# Patient Record
Sex: MALE | Race: WHITE | NOT HISPANIC OR LATINO | Employment: FULL TIME | URBAN - METROPOLITAN AREA
[De-identification: names, ages, dates, MRNs, and addresses within clinical notes are randomized per-mention and may not be internally consistent; named-entity substitution may affect disease eponyms.]

---

## 2017-01-03 ENCOUNTER — ALLSCRIPTS OFFICE VISIT (OUTPATIENT)
Dept: OTHER | Facility: OTHER | Age: 52
End: 2017-01-03

## 2017-01-11 ENCOUNTER — ALLSCRIPTS OFFICE VISIT (OUTPATIENT)
Dept: OTHER | Facility: OTHER | Age: 52
End: 2017-01-11

## 2017-01-13 ENCOUNTER — GENERIC CONVERSION - ENCOUNTER (OUTPATIENT)
Dept: OTHER | Facility: OTHER | Age: 52
End: 2017-01-13

## 2017-01-13 ENCOUNTER — HOSPITAL ENCOUNTER (OUTPATIENT)
Dept: RADIOLOGY | Facility: MEDICAL CENTER | Age: 52
Discharge: HOME | End: 2017-01-13
Payer: COMMERCIAL

## 2017-01-13 DIAGNOSIS — J20.9 ACUTE BRONCHITIS: ICD-10-CM

## 2017-01-13 PROCEDURE — 71020 HB CHEST X-RAY 2VW FRONTAL&LATL: CPT

## 2017-01-16 ENCOUNTER — GENERIC CONVERSION - ENCOUNTER (OUTPATIENT)
Dept: OTHER | Facility: OTHER | Age: 52
End: 2017-01-16

## 2017-03-15 DIAGNOSIS — E78.5 HYPERLIPIDEMIA: ICD-10-CM

## 2017-03-15 DIAGNOSIS — E03.9 HYPOTHYROIDISM: ICD-10-CM

## 2017-03-27 ENCOUNTER — ALLSCRIPTS OFFICE VISIT (OUTPATIENT)
Dept: OTHER | Facility: OTHER | Age: 52
End: 2017-03-27

## 2017-05-15 ENCOUNTER — HOSPITAL ENCOUNTER (OUTPATIENT)
Dept: RADIOLOGY | Facility: MEDICAL CENTER | Age: 52
Discharge: HOME | End: 2017-05-15
Admitting: FAMILY MEDICINE
Payer: COMMERCIAL

## 2017-05-15 ENCOUNTER — OFFICE VISIT (OUTPATIENT)
Dept: URGENT CARE | Facility: MEDICAL CENTER | Age: 52
End: 2017-05-15
Payer: COMMERCIAL

## 2017-05-15 ENCOUNTER — TRANSCRIBE ORDERS (OUTPATIENT)
Dept: URGENT CARE | Facility: MEDICAL CENTER | Age: 52
End: 2017-05-15

## 2017-05-15 DIAGNOSIS — S99.911A ANKLE INJURIES, RIGHT, INITIAL ENCOUNTER: Primary | ICD-10-CM

## 2017-05-15 DIAGNOSIS — S99.911A ANKLE INJURIES, RIGHT, INITIAL ENCOUNTER: ICD-10-CM

## 2017-05-15 DIAGNOSIS — M25.571 PAIN IN RIGHT ANKLE: ICD-10-CM

## 2017-05-15 PROCEDURE — 73610 X-RAY EXAM OF ANKLE: CPT

## 2017-05-15 PROCEDURE — G0382 LEV 3 HOSP TYPE B ED VISIT: HCPCS

## 2017-05-15 PROCEDURE — S9083 URGENT CARE CENTER GLOBAL: HCPCS

## 2017-09-27 DIAGNOSIS — Z12.5 ENCOUNTER FOR SCREENING FOR MALIGNANT NEOPLASM OF PROSTATE: ICD-10-CM

## 2017-09-27 DIAGNOSIS — E03.9 HYPOTHYROIDISM: ICD-10-CM

## 2017-09-27 DIAGNOSIS — E78.5 HYPERLIPIDEMIA: ICD-10-CM

## 2017-09-27 DIAGNOSIS — J30.9 ALLERGIC RHINITIS: ICD-10-CM

## 2018-01-12 VITALS
TEMPERATURE: 98.1 F | BODY MASS INDEX: 25.82 KG/M2 | HEART RATE: 72 BPM | WEIGHT: 179.97 LBS | SYSTOLIC BLOOD PRESSURE: 138 MMHG | DIASTOLIC BLOOD PRESSURE: 70 MMHG

## 2018-01-13 VITALS
HEIGHT: 70 IN | SYSTOLIC BLOOD PRESSURE: 128 MMHG | BODY MASS INDEX: 25.97 KG/M2 | WEIGHT: 181.38 LBS | DIASTOLIC BLOOD PRESSURE: 84 MMHG | TEMPERATURE: 97.3 F

## 2018-01-13 NOTE — RESULT NOTES
Verified Results  * XR CHEST PA & LATERAL [id number] 10:20AM Alisa Wynn Order Number: [id number]     Test Name Result Flag Reference   XR CHEST PA & LATERAL (Report)     CHEST      INDICATION: Cough, chest pain and tightness, shortness of breath for 2 weeks     COMPARISON: None     VIEWS: Frontal and lateral projections; 2 images     FINDINGS:        Cardiomediastinal silhouette appears unremarkable  The lungs are clear  No pneumothorax or pleural effusion  Visualized osseous structures appear within normal limits for the patient's age  IMPRESSION:     No active pulmonary disease         Workstation performed: [id number]     Signed by:   Armando Santiago MD   1/13/17

## 2018-01-13 NOTE — RESULT NOTES
Message   Recorded as Task   Date: 04/28/2016 02:23 PM, Created By: Lux Avila   Task Name: Call Back   Assigned To: Brady and Adele,Clinical Team   Regarding Patient: Lindsey Loo, Status: Active   Comment:    Lux Avila - 28 Apr 2016 2:23 PM     TASK CREATED  Patient's knee x-ray shows mild arthritis with calcium deposits  Per radiology this might be a deposit of calcium pyrophosphate, i e  "pseudogout" I would like patient to see [facility] rheumatology for an evaluation   Alyssa Bob - 29 Apr 2016 10:40 AM     TASK EDITED  pt notified of results and instructions  order entered for rheumatology to contact pt   pt instructed to call back if he does not here from them       Signatures   Electronically signed by : Kirby Argueta, ; Apr 29 2016 10:40AM EST                       (Author)

## 2018-01-14 VITALS
HEART RATE: 64 BPM | HEIGHT: 70 IN | SYSTOLIC BLOOD PRESSURE: 120 MMHG | WEIGHT: 182.6 LBS | BODY MASS INDEX: 26.14 KG/M2 | DIASTOLIC BLOOD PRESSURE: 80 MMHG

## 2018-01-16 NOTE — MISCELLANEOUS
Message   Recorded as Task   Date: 01/12/2017 04:20 PM, Created By: Miguel Hurst   Task Name: Medical Complaint Callback   Assigned To: Maged,Clinical Team   Regarding Patient: Molly Rinne, Status: Complete   Comment:    Miguel Hurst - 12 Jan 2017 4:20 PM     TASK CREATED  BT-pt called and said he coughed up bloody mucous today and wanted to know if you wanted to see him again    Johnny Manley - 13 Jan 2017 7:09 AM     TASK REPLIED TO: Previously Assigned To Maged,Clinical Team    Have patient get a chest x-ray and restart Levaquin 500 mg one twice a day #10   Abby Pabon - 13 Jan 2017 9:45 AM     TASK EDITED  i spoke with patient regarding these instructions  [facility] orders are in computer for xray and script   cd   Abby Pabon - 13 Jan 2017 9:48 AM     TASK COMPLETED   Johnny Manley - 13 Jan 2017 3:43 PM     TASK REACTIVATED   Johnny Manley - 13 Jan 2017 3:43 PM     TASK EDITED  The prescription should be Levaquin 500 mg one daily for 10 days  Radha Cabrera - 13 Jan 2017 3:45 PM     TASK EDITED  Levaquin 500mg one daily for 10 days was sent to pt  pharmacy and he is aware  Radha Cabrera - 13 Jan 2017 3:45 PM     TASK COMPLETED        Active Problems    1  Abnormal thyroid blood test (794 5) (R94 6)   2  Acute buttock pain (729 1,338 19) (M79 1)   3  Allergic rhinitis (477 9) (J30 9)   4  Bronchitis, acute (466 0) (J20 9)   5  Encounter for prostate cancer screening (V76 44) (Z12 5)   6  Erectile dysfunction of non-organic origin (302 72) (F52 21)   7  Fatigue (780 79) (R53 83)   8  Finger injury (959 5) (S69 90XA)   9  Hamstring tendonitis at origin (727 09) (M76 899)   10  Hordeolum (373 11) (H00 019)   11  Hyperlipidemia (272 4) (E78 5)   12  Hypothyroidism (244 9) (E03 9)   13  Inguinal hernia (550 90) (K40 90)   14  Laceration of finger, initial encounter (883 0) (S61 219A)   15  Nocturia (788 43) (R35 1)   16  Pseudogout of knee (592 63,571 45) (M11 009)   17   Right knee pain (798 46) (M25 561)   18  Strain of hamstring insertion (843 8) (S76 319A)   19  Stye (373 11) (H00 019)   20  Subungual hematoma of fingernail, initial encounter (923 3) (S60 10XA)   21  Upper respiratory infection (465 9) (J06 9)    Current Meds   1  Cialis 5 MG Oral Tablet; take 1 tablet by mouth once daily; Therapy: 38JHY1105 to (Miguel Found)  Requested for: 62ATC6524; Last   Rx:16Iqg0872 Ordered   2  LevoFLOXacin 500 MG Oral Tablet; one tablet daily with food for 10 days; Therapy: 93QIQ9782 to (Last MY:58PTT1126)  Requested for: 84POG6623 Ordered   3  Levothyroxine Sodium 25 MCG Oral Tablet; Take 1 tablet daily; Therapy: 45Pyv1751 to (Last Rx:10Bqh0525)  Requested for: 55Cxo2810 Ordered   4  PredniSONE 10 MG Oral Tablet; 5 tablets for 2 days 4 tablets for 3 days 3 tablets for 3   days 2 tablets for 2 days 1 tablet for 1 day; Therapy: 12LCG3400 to (Last Rx:11Jan2017)  Requested for: 62KSR6308 Ordered   5  ProAir RespiClick 355 (90 Base) MCG/ACT Inhalation Aerosol Powder Breath Activated;   INHALE 2 PUFFS 4 times daily; Therapy: 27ZFI9151 to (Evaluate:12Jan2017); Last Rx:11Jan2017 Ordered   6  Promethazine-Codeine 6 25-10 MG/5ML Oral Syrup; TAKE 2 TSP Bedtime; Therapy: 88NCG6337 to (Evaluate:13Jan2017); Last Rx:11Jan2017 Ordered   7  Viagra 100 MG Oral Tablet; One tablet as needed, 1 to 4 hours before intercourse; Therapy: 63VCH4800 to (Last Rx:03Jan2017) Ordered    Allergies    1   Simvastatin TABS    Plan  Bronchitis, acute    · LevoFLOXacin 500 MG Oral Tablet (Levaquin); one tablet daily with food for 10  days    Signatures   Electronically signed by : Gerri Laboy, ; Jan 13 2017  3:45PM EST                       (Author)

## 2018-01-17 NOTE — MISCELLANEOUS
Message  Patient called this morning stating that he has been feeling more fatigued than usual  He has noticed this when exercising and has not had as much tolerance as usual  He is interested in possibly taking a testosterone supplement and is looking for guidance  Recommend that he first had testosterone testing and consider prescription supplement if necessary  Patient was agreeable  Labs were ordered and will be mailed to the patient  Plan  Encounter for prostate cancer screening, Fatigue, Hyperlipidemia    · (1) TESTOSTERONE, FREE (DIRECT) AND TOTAL; Status:Active; Requested  KQJ:[id number];    · (1) TSH WITH FT4 REFLEX; Status:Active; Requested MSL:54EVN2826;   Encounter for prostate cancer screening, Fatigue, Hyperlipidemia, Nocturia    · (1) PSA (SCREEN) (Dx V76 44 Screen for Prostate Cancer); Status:Active; Requested  UVM:[id number];   Fatigue, Hyperlipidemia, Nocturia    · (1) CBC/PLT/DIFF; Status:Active; Requested WOW:87BIE5599;    · (1) COMPREHENSIVE METABOLIC PANEL; Status:Active; Requested WCO:20BPU2908;    · (1) LIPID PANEL FASTING W DIRECT LDL REFLEX; Status:Active;  Requested  YNZ:[id number];     Signatures   Electronically signed by : Mahamed De La Cruz [facility]; Jun 16 2016 12:44PM EST                       (Author)

## 2018-02-24 DIAGNOSIS — E03.9 HYPOTHYROIDISM, UNSPECIFIED TYPE: Primary | ICD-10-CM

## 2018-02-26 RX ORDER — LEVOTHYROXINE SODIUM 0.03 MG/1
TABLET ORAL
Qty: 90 TABLET | Refills: 0 | Status: SHIPPED | OUTPATIENT
Start: 2018-02-26 | End: 2018-05-28 | Stop reason: SDUPTHER

## 2018-04-03 DIAGNOSIS — E78.5 HYPERLIPIDEMIA: ICD-10-CM

## 2018-04-18 ENCOUNTER — OFFICE VISIT (OUTPATIENT)
Dept: FAMILY MEDICINE CLINIC | Facility: CLINIC | Age: 53
End: 2018-04-18
Payer: COMMERCIAL

## 2018-04-18 ENCOUNTER — HOSPITAL ENCOUNTER (OUTPATIENT)
Dept: CT IMAGING | Facility: HOSPITAL | Age: 53
Discharge: HOME | End: 2018-04-18
Payer: COMMERCIAL

## 2018-04-18 VITALS
SYSTOLIC BLOOD PRESSURE: 108 MMHG | HEIGHT: 69 IN | BODY MASS INDEX: 27.13 KG/M2 | WEIGHT: 183.2 LBS | DIASTOLIC BLOOD PRESSURE: 78 MMHG | HEART RATE: 64 BPM

## 2018-04-18 DIAGNOSIS — R10.9 RIGHT FLANK PAIN: ICD-10-CM

## 2018-04-18 DIAGNOSIS — R10.9 RIGHT FLANK PAIN: Primary | ICD-10-CM

## 2018-04-18 LAB
SL AMB  POCT GLUCOSE, UA: NORMAL
SL AMB LEUKOCYTE ESTERASE,UA: NORMAL
SL AMB POCT BILIRUBIN,UA: NORMAL
SL AMB POCT BLOOD,UA: NORMAL
SL AMB POCT CLARITY,UA: CLEAR
SL AMB POCT COLOR,UA: YELLOW
SL AMB POCT KETONES,UA: NORMAL
SL AMB POCT NITRITE,UA: NORMAL
SL AMB POCT PH,UA: 6.5
SL AMB POCT SPECIFIC GRAVITY,UA: 1.01
SL AMB POCT URINE PROTEIN: NORMAL
SL AMB POCT UROBILINOGEN: 0.2

## 2018-04-18 PROCEDURE — 81003 URINALYSIS AUTO W/O SCOPE: CPT | Performed by: PHYSICIAN ASSISTANT

## 2018-04-18 PROCEDURE — 99214 OFFICE O/P EST MOD 30 MIN: CPT | Performed by: PHYSICIAN ASSISTANT

## 2018-04-18 PROCEDURE — 74176 CT ABD & PELVIS W/O CONTRAST: CPT

## 2018-04-18 RX ORDER — TADALAFIL 5 MG/1
TABLET ORAL
COMMUNITY
Start: 2013-10-28 | End: 2018-10-09 | Stop reason: SDUPTHER

## 2018-04-18 RX ORDER — FENOFIBRATE 160 MG/1
1 TABLET ORAL DAILY
COMMUNITY
Start: 2017-03-27 | End: 2018-04-27 | Stop reason: SDUPTHER

## 2018-04-18 NOTE — PATIENT INSTRUCTIONS
1   Right flank pain-possible nephrolithiasis versus pyelonephritis  Urinalysis in the office is within normal limits  There are very little symptoms to support pyelonephritis at this time  I suspect he may have a small kidney stone versus a passed kidney stone  He is leaving for [place] [employer] [facility]) tomorrow and would recommend stat CT scan today to assess for stones size in location to rule out any emergent situations or evidence of hydronephrosis from obstruction  Patient verbalizes understanding and agreement with plan

## 2018-04-18 NOTE — PROGRESS NOTES
Assessment/Plan:  Patient Instructions   1  Right flank pain-possible nephrolithiasis versus pyelonephritis  Urinalysis in the office is within normal limits  There are very little symptoms to support pyelonephritis at this time  I suspect he may have a small kidney stone versus a passed kidney stone  He is leaving for [place] [facility]) tomorrow and would recommend stat CT scan today to assess for stones size in location to rule out any emergent situations or evidence of hydronephrosis from obstruction  Patient verbalizes understanding and agreement with plan  No problem-specific Assessment & Plan notes found for this encounter  Diagnoses and all orders for this visit:    Right flank pain  -     CT renal stone study abdomen pelvis wo contrast; Future    Other orders  -     fenofibrate (TRIGLIDE) 160 MG tablet; Take 1 tablet by mouth daily  -     tadalafil (CIALIS) 5 MG tablet; Take by mouth          Subjective: Pt c/o right sided flank pain on and off x 1 week but constant over the last 2 to 3 days  kw     Patient ID: Elkin Jones is a 46 y o  male  HPI:  This is a 72-year-old gentleman that presents to the office with onset of right flank pain about a week ago  Symptoms were intermittent but could be quite severe and intense when it would come  Over the past 2-3 days it has been more consistent  Pain at its worst is an 8/10  He describes it is very sharp in nature and often stops him from what he is doing at the time  He has had some generalized chills over the past few days but is unaware of any measurable fevers  He has not had any new urinary symptoms and denies burning or discomfort upon urination  He has not had any urinary frequency  He denies any prior history of kidney stones          The following portions of the patient's history were reviewed and updated as appropriate: allergies, current medications, past family history, past medical history, past social history, past surgical history and problem list     Review of Systems   Constitutional: Negative for chills, fatigue and fever  HENT: Negative for congestion, ear pain and sinus pressure  Eyes: Negative for visual disturbance  Respiratory: Negative for cough, chest tightness and shortness of breath  Cardiovascular: Negative for chest pain and palpitations  Gastrointestinal: Negative for diarrhea, nausea and vomiting  Endocrine: Negative for polyuria  Genitourinary: Positive for flank pain  Negative for dysuria and frequency  Musculoskeletal: Positive for myalgias  Negative for arthralgias  Skin: Negative for pallor and rash  Neurological: Negative for dizziness, weakness, light-headedness, numbness and headaches  Psychiatric/Behavioral: Negative for agitation, behavioral problems and sleep disturbance  All other systems reviewed and are negative  Objective:  Vitals:    04/18/18 0806   BP: 108/78   BP Location: Left arm   Pulse: 64   Weight: 83 1 kg (183 lb 3 2 oz)   Height: 5' 9" (1 753 m)         /78 (BP Location: Left arm)   Pulse 64   Ht 5' 9" (1 753 m)   Wt 83 1 kg (183 lb 3 2 oz)   BMI 27 05 kg/m²          Physical Exam   Constitutional: He is oriented to person, place, and time  He appears well-developed and well-nourished  HENT:   Head: Normocephalic  Cardiovascular: Normal rate and regular rhythm  Pulmonary/Chest: Effort normal and breath sounds normal  No respiratory distress  Abdominal: Soft  He exhibits distension  There is tenderness  There is rebound  There is some mild tenderness to deep palpation in the posterior renal area  There is no CVA tenderness  Musculoskeletal: Normal range of motion  Neurological: He is alert and oriented to person, place, and time  Psychiatric: He has a normal mood and affect

## 2018-04-27 DIAGNOSIS — E78.2 MIXED HYPERLIPIDEMIA: Primary | ICD-10-CM

## 2018-04-27 RX ORDER — FENOFIBRATE 160 MG/1
TABLET ORAL
Qty: 90 TABLET | Refills: 1 | Status: SHIPPED | OUTPATIENT
Start: 2018-04-27 | End: 2018-10-07 | Stop reason: SDUPTHER

## 2018-05-28 DIAGNOSIS — E03.9 HYPOTHYROIDISM, UNSPECIFIED TYPE: ICD-10-CM

## 2018-05-29 RX ORDER — LEVOTHYROXINE SODIUM 0.03 MG/1
TABLET ORAL
Qty: 90 TABLET | Refills: 0 | Status: SHIPPED | OUTPATIENT
Start: 2018-05-29 | End: 2018-08-27 | Stop reason: SDUPTHER

## 2018-06-11 ENCOUNTER — OFFICE VISIT (OUTPATIENT)
Dept: FAMILY MEDICINE CLINIC | Facility: CLINIC | Age: 53
End: 2018-06-11
Payer: COMMERCIAL

## 2018-06-11 ENCOUNTER — APPOINTMENT (OUTPATIENT)
Dept: LAB | Facility: CLINIC | Age: 53
End: 2018-06-11
Payer: COMMERCIAL

## 2018-06-11 ENCOUNTER — TRANSCRIBE ORDERS (OUTPATIENT)
Dept: LAB | Facility: CLINIC | Age: 53
End: 2018-06-11

## 2018-06-11 VITALS
HEIGHT: 69 IN | SYSTOLIC BLOOD PRESSURE: 122 MMHG | BODY MASS INDEX: 27.08 KG/M2 | HEART RATE: 60 BPM | DIASTOLIC BLOOD PRESSURE: 68 MMHG | WEIGHT: 182.8 LBS

## 2018-06-11 DIAGNOSIS — M75.51 ACUTE BURSITIS OF RIGHT SHOULDER: ICD-10-CM

## 2018-06-11 DIAGNOSIS — E03.9 HYPOTHYROIDISM, UNSPECIFIED TYPE: ICD-10-CM

## 2018-06-11 DIAGNOSIS — E78.5 HYPERLIPIDEMIA, UNSPECIFIED HYPERLIPIDEMIA TYPE: ICD-10-CM

## 2018-06-11 DIAGNOSIS — G47.00 INSOMNIA, UNSPECIFIED TYPE: Primary | ICD-10-CM

## 2018-06-11 LAB — TSH SERPL DL<=0.05 MIU/L-ACNC: 2.46 UIU/ML (ref 0.36–3.74)

## 2018-06-11 PROCEDURE — 84443 ASSAY THYROID STIM HORMONE: CPT | Performed by: PHYSICIAN ASSISTANT

## 2018-06-11 PROCEDURE — 99214 OFFICE O/P EST MOD 30 MIN: CPT | Performed by: PHYSICIAN ASSISTANT

## 2018-06-11 PROCEDURE — 36415 COLL VENOUS BLD VENIPUNCTURE: CPT | Performed by: PHYSICIAN ASSISTANT

## 2018-06-11 RX ORDER — TRAZODONE HYDROCHLORIDE 50 MG/1
50 TABLET ORAL
Qty: 30 TABLET | Refills: 1 | Status: SHIPPED | OUTPATIENT
Start: 2018-06-11 | End: 2019-01-22

## 2018-06-11 RX ORDER — DICLOFENAC POTASSIUM 50 MG/1
50 TABLET, FILM COATED ORAL 2 TIMES DAILY
Qty: 60 TABLET | Refills: 0 | Status: SHIPPED | OUTPATIENT
Start: 2018-06-11 | End: 2018-08-14

## 2018-06-11 NOTE — PATIENT INSTRUCTIONS
1   Insomnia-recommend assessing TSH level to rule out thyroid abnormality contributing to this  He will continue levothyroxine at 25 mcg daily  We will try trazodone 50 mg at bedtime for the next 2 weeks and if not helping consider titrating up to a higher dosage  2   Right shoulder pain/subacromial bursitis-recommend treatment with diclofenac 50 mg twice daily with food  If symptoms are not better in the next 2 weeks consider referral to orthopedic specialist for injection therapy  3   Hypothyroidism-status unknown  Will reassess labs as above  4   Hyperlipidemia-presently stable with statin therapy

## 2018-06-11 NOTE — PROGRESS NOTES
Assessment/Plan:  Patient Instructions   1  Insomnia-recommend assessing TSH level to rule out thyroid abnormality contributing to this  He will continue levothyroxine at 25 mcg daily  We will try trazodone 50 mg at bedtime for the next 2 weeks and if not helping consider titrating up to a higher dosage  2   Right shoulder pain/subacromial bursitis-recommend treatment with diclofenac 50 mg twice daily with food  If symptoms are not better in the next 2 weeks consider referral to orthopedic specialist for injection therapy  3   Hypothyroidism-status unknown  Will reassess labs as above  4   Hyperlipidemia-presently stable with statin therapy  Diagnoses and all orders for this visit:    Insomnia, unspecified type  -     TSH, 3rd generation with T4 reflex  -     traZODone (DESYREL) 50 mg tablet; Take 1 tablet (50 mg total) by mouth daily at bedtime    Hypothyroidism, unspecified type    Hyperlipidemia, unspecified hyperlipidemia type    Acute bursitis of right shoulder  -     diclofenac potassium (CATAFLAM) 50 mg tablet; Take 1 tablet (50 mg total) by mouth 2 (two) times a day          Subjective: pt is here because of a change in sleep pattern in the last month  He states he falls asleep fine but has trouble staying asleep~cd     Patient ID: Charlene Medellin is a 46 y o  male  HPI:  This is a 51-year-old gentleman that presents to the office with concerns over change in his sleep pattern for the past month  He has been having difficulty waking about 30 min to an hour in to sleep  He typically goes to sleep between 9 and 10:00 p m  and wakes up between 530 and 6:00 a m  He has been lying awake much of the night after he has woken up in the middle  He has had difficulty returning to sleep sometimes for 2-4 hours  He is then feeling very tired when he wakes up in the morning at 5:30 a m  or 6:00 a m    He has been having some shoulder pain recently as well and is not sure if this may be waking him more could be related  It is at his right shoulder and most painful with abduction  He has been working in the evenings on a home project, laying some brick recently  He is also very physically active in continues to lift weights regularly  He does also have a history of hypothyroidism and has been sometime since he has had a TSH level drawn  He is continuing levothyroxine 25 mcg daily  He has also tried over-the-counter medications such as melatonin without any benefit  The following portions of the patient's history were reviewed and updated as appropriate: allergies, current medications, past family history, past medical history, past social history, past surgical history and problem list     Review of Systems   Constitutional: Negative for fever  HENT: Negative for congestion  Respiratory: Negative for cough, chest tightness and shortness of breath  Cardiovascular: Negative for chest pain  Musculoskeletal: Positive for arthralgias  Psychiatric/Behavioral: Positive for sleep disturbance  Objective:  Vitals:    06/11/18 1330   BP: 122/68   BP Location: Left arm   Patient Position: Sitting   Cuff Size: Large   Pulse: 60   Weight: 82 9 kg (182 lb 12 8 oz)   Height: 5' 9" (1 753 m)                Physical Exam   Constitutional: He is oriented to person, place, and time  He appears well-developed and well-nourished  HENT:   Head: Normocephalic  Cardiovascular: Normal rate and regular rhythm  Pulmonary/Chest: Effort normal and breath sounds normal  No respiratory distress  Abdominal: Soft  Musculoskeletal: Normal range of motion  Pain with abduction of the right shoulder greater than 90°  There is point tenderness at the [address]  Neurological: He is alert and oriented to person, place, and time  Psychiatric: He has a normal mood and affect  Nursing note and vitals reviewed

## 2018-08-14 ENCOUNTER — OFFICE VISIT (OUTPATIENT)
Dept: FAMILY MEDICINE CLINIC | Facility: CLINIC | Age: 53
End: 2018-08-14
Payer: COMMERCIAL

## 2018-08-14 VITALS
HEIGHT: 69 IN | BODY MASS INDEX: 26.36 KG/M2 | HEART RATE: 64 BPM | WEIGHT: 178 LBS | DIASTOLIC BLOOD PRESSURE: 84 MMHG | SYSTOLIC BLOOD PRESSURE: 124 MMHG

## 2018-08-14 DIAGNOSIS — M25.562 LEFT KNEE PAIN, UNSPECIFIED CHRONICITY: Primary | ICD-10-CM

## 2018-08-14 PROCEDURE — 1036F TOBACCO NON-USER: CPT | Performed by: PHYSICIAN ASSISTANT

## 2018-08-14 PROCEDURE — 99213 OFFICE O/P EST LOW 20 MIN: CPT | Performed by: PHYSICIAN ASSISTANT

## 2018-08-14 PROCEDURE — 3008F BODY MASS INDEX DOCD: CPT | Performed by: PHYSICIAN ASSISTANT

## 2018-08-14 NOTE — PROGRESS NOTES
Assessment/Plan:  Patient Instructions   1  Left knee pain-history of surgery at age 23 from sports related injuries  Patient continues avid running but is having limitation  Will consult with Orthopedic surgery to discuss best intervention  Diagnoses and all orders for this visit:    Left knee pain, unspecified chronicity  -     Ambulatory referral to Orthopedic Surgery; Future          Subjective:   C/o left knee pain  Hx of reconstruction 1984  mjs     Patient ID: Paula Mendez is a 46 y o  male  HPI:  This is a 59-year-old gentleman that presents to the office with a history of left knee reconstructive surgery at the age of 23 from sports related injuries  He has been an avid runner and runs competitively  He has been having increasing difficulty in the past year with his running and noticing that he is favoring his right leg  He has been running asymmetrically and concern with possible other injuries to other joints which may incur if he continues  He is having significant discomfort in his left knee and feels that it is unstable at times  He has quite a bit of grinding and popping sensation with it  He would like to see a an orthopedic surgeon and discuss total knee replacement options  The following portions of the patient's history were reviewed and updated as appropriate: allergies, current medications, past family history, past medical history, past social history, past surgical history and problem list     Review of Systems   Constitutional: Negative for chills, fatigue and fever  HENT: Negative for congestion, ear pain and sinus pressure  Eyes: Negative for visual disturbance  Respiratory: Negative for cough, chest tightness and shortness of breath  Cardiovascular: Negative for chest pain and palpitations  Gastrointestinal: Negative for diarrhea, nausea and vomiting  Endocrine: Negative for polyuria  Genitourinary: Negative for dysuria and frequency  Musculoskeletal: Positive for arthralgias  Negative for myalgias  Skin: Negative for pallor and rash  Neurological: Negative for dizziness, weakness, light-headedness, numbness and headaches  Psychiatric/Behavioral: Negative for agitation, behavioral problems and sleep disturbance  All other systems reviewed and are negative  Objective:      /84   Pulse 64   Ht 5' 9" (1 753 m)   Wt 80 7 kg (178 lb)   BMI 26 29 kg/m²          Physical Exam   Constitutional: He is oriented to person, place, and time  He appears well-developed and well-nourished  No distress  HENT:   Head: Normocephalic and atraumatic  Right Ear: External ear normal    Left Ear: External ear normal    Nose: Nose normal    Mouth/Throat: Oropharynx is clear and moist  No oropharyngeal exudate  Eyes: Conjunctivae and EOM are normal  Pupils are equal, round, and reactive to light  Neck: Normal range of motion  Neck supple  No tracheal deviation present  No thyromegaly present  Cardiovascular: Normal rate, regular rhythm and normal heart sounds  Exam reveals no friction rub  No murmur heard  Pulmonary/Chest: Effort normal and breath sounds normal  No respiratory distress  He has no wheezes  He has no rales  Abdominal: Soft  Bowel sounds are normal  He exhibits no distension  There is no tenderness  There is no rebound and no guarding  Musculoskeletal: Normal range of motion  He exhibits no edema or tenderness  Lymphadenopathy:     He has no cervical adenopathy  Neurological: He is alert and oriented to person, place, and time  No cranial nerve deficit  Coordination normal    Skin: Skin is warm and dry  No rash noted  No erythema  Psychiatric: He has a normal mood and affect  His behavior is normal  Thought content normal    Nursing note and vitals reviewed

## 2018-08-14 NOTE — PATIENT INSTRUCTIONS
1   Left knee pain-history of surgery at age 23 from sports related injuries  Patient continues avid running but is having limitation  Will consult with Orthopedic surgery to discuss best intervention

## 2018-08-27 DIAGNOSIS — E03.9 HYPOTHYROIDISM, UNSPECIFIED TYPE: ICD-10-CM

## 2018-08-27 RX ORDER — LEVOTHYROXINE SODIUM 0.03 MG/1
TABLET ORAL
Qty: 90 TABLET | Refills: 0 | Status: SHIPPED | OUTPATIENT
Start: 2018-08-27 | End: 2018-11-25 | Stop reason: SDUPTHER

## 2018-10-02 ENCOUNTER — CLINICAL SUPPORT (OUTPATIENT)
Dept: FAMILY MEDICINE CLINIC | Facility: CLINIC | Age: 53
End: 2018-10-02
Payer: COMMERCIAL

## 2018-10-02 DIAGNOSIS — Z11.1 SCREENING FOR TUBERCULOSIS: Primary | ICD-10-CM

## 2018-10-02 PROCEDURE — 86580 TB INTRADERMAL TEST: CPT | Performed by: PHYSICIAN ASSISTANT

## 2018-10-05 LAB
INDURATION: 0 MM
TB SKIN TEST: NEGATIVE

## 2018-10-07 DIAGNOSIS — E78.2 MIXED HYPERLIPIDEMIA: ICD-10-CM

## 2018-10-08 RX ORDER — FENOFIBRATE 160 MG/1
TABLET ORAL
Qty: 90 TABLET | Refills: 1 | Status: SHIPPED | OUTPATIENT
Start: 2018-10-08 | End: 2019-04-06 | Stop reason: SDUPTHER

## 2018-10-09 DIAGNOSIS — F52.21 ERECTILE DYSFUNCTION OF NON-ORGANIC ORIGIN: Primary | ICD-10-CM

## 2018-10-09 RX ORDER — TADALAFIL 5 MG
TABLET ORAL
Qty: 30 TABLET | Refills: 5 | Status: SHIPPED | OUTPATIENT
Start: 2018-10-09 | End: 2019-01-22 | Stop reason: ALTCHOICE

## 2018-11-25 DIAGNOSIS — E03.9 HYPOTHYROIDISM, UNSPECIFIED TYPE: ICD-10-CM

## 2018-11-26 RX ORDER — LEVOTHYROXINE SODIUM 0.03 MG/1
TABLET ORAL
Qty: 90 TABLET | Refills: 0 | Status: SHIPPED | OUTPATIENT
Start: 2018-11-26 | End: 2019-02-23 | Stop reason: SDUPTHER

## 2019-01-22 ENCOUNTER — OFFICE VISIT (OUTPATIENT)
Dept: FAMILY MEDICINE CLINIC | Facility: CLINIC | Age: 54
End: 2019-01-22
Payer: COMMERCIAL

## 2019-01-22 VITALS
SYSTOLIC BLOOD PRESSURE: 136 MMHG | HEIGHT: 69 IN | WEIGHT: 187 LBS | DIASTOLIC BLOOD PRESSURE: 70 MMHG | BODY MASS INDEX: 27.7 KG/M2 | TEMPERATURE: 97.4 F | HEART RATE: 80 BPM

## 2019-01-22 DIAGNOSIS — R51.9 NONINTRACTABLE HEADACHE, UNSPECIFIED CHRONICITY PATTERN, UNSPECIFIED HEADACHE TYPE: Primary | ICD-10-CM

## 2019-01-22 DIAGNOSIS — E78.5 HYPERLIPIDEMIA, UNSPECIFIED HYPERLIPIDEMIA TYPE: ICD-10-CM

## 2019-01-22 DIAGNOSIS — E03.9 HYPOTHYROIDISM, UNSPECIFIED TYPE: ICD-10-CM

## 2019-01-22 DIAGNOSIS — J32.1 CHRONIC FRONTAL SINUSITIS: ICD-10-CM

## 2019-01-22 PROCEDURE — 99214 OFFICE O/P EST MOD 30 MIN: CPT | Performed by: PHYSICIAN ASSISTANT

## 2019-01-22 RX ORDER — SILDENAFIL 100 MG/1
100 TABLET, FILM COATED ORAL DAILY PRN
Qty: 10 TABLET | Refills: 5 | Status: SHIPPED | OUTPATIENT
Start: 2019-01-22 | End: 2019-11-29

## 2019-01-22 RX ORDER — ASPIRIN 81 MG/1
81 TABLET ORAL DAILY
COMMUNITY
End: 2021-01-19 | Stop reason: ALTCHOICE

## 2019-01-22 RX ORDER — AMOXICILLIN AND CLAVULANATE POTASSIUM 875; 125 MG/1; MG/1
1 TABLET, FILM COATED ORAL EVERY 12 HOURS SCHEDULED
Qty: 20 TABLET | Refills: 0 | Status: SHIPPED | OUTPATIENT
Start: 2019-01-22 | End: 2019-02-01

## 2019-01-22 RX ORDER — LANOLIN ALCOHOL/MO/W.PET/CERES
CREAM (GRAM) TOPICAL DAILY
Status: ON HOLD | COMMUNITY
End: 2019-12-12 | Stop reason: ALTCHOICE

## 2019-01-22 NOTE — PROGRESS NOTES
Assessment/Plan:  Patient Instructions   1  Headache-differential includes tension headache, chronic sinusitis, thyroid or electrolyte abnormality  Will also assess Lyme disease panel and rule out temporal arteritis with sed rate  Recommend starting on Augmentin 875 mg twice daily for 10 days for probable sinusitis  If symptoms are not improving and labs are all normal consider further evaluation with CT imaging  2  Chronic sinusitis-patient with some symptoms of congestion, neck aches, dizziness  Ongoing symptoms for the past month  Treatment as above with Augmentin  3  Hypothyroidism-status unknown-patient continues levothyroxine 25 mcg daily  4  Mixed hyperlipidemia-will reassess labs  Continue fenofibrate  Diagnoses and all orders for this visit:    Nonintractable headache, unspecified chronicity pattern, unspecified headache type  -     sildenafil (VIAGRA) 100 mg tablet; Take 1 tablet (100 mg total) by mouth daily as needed for erectile dysfunction  -     CBC and differential  -     Comprehensive metabolic panel  -     Lipid Panel with Direct LDL reflex  -     TSH, 3rd generation with Free T4 reflex  -     Sedimentation rate, automated  -     Lyme Antibody Profile with reflex to WB    Chronic frontal sinusitis  -     amoxicillin-clavulanate (AUGMENTIN) 875-125 mg per tablet;  Take 1 tablet by mouth every 12 (twelve) hours for 10 days  -     CBC and differential  -     Comprehensive metabolic panel  -     Lipid Panel with Direct LDL reflex  -     TSH, 3rd generation with Free T4 reflex  -     Sedimentation rate, automated  -     Lyme Antibody Profile with reflex to WB    Hypothyroidism, unspecified type  -     CBC and differential  -     Comprehensive metabolic panel  -     Lipid Panel with Direct LDL reflex  -     TSH, 3rd generation with Free T4 reflex  -     Sedimentation rate, automated  -     Lyme Antibody Profile with reflex to WB    Hyperlipidemia, unspecified hyperlipidemia type  - CBC and differential  -     Comprehensive metabolic panel  -     Lipid Panel with Direct LDL reflex  -     TSH, 3rd generation with Free T4 reflex  -     Sedimentation rate, automated  -     Lyme Antibody Profile with reflex to WB    Other orders  -     aspirin (ECOTRIN LOW STRENGTH) 81 mg EC tablet; Take 81 mg by mouth daily  -     cyanocobalamin (VITAMIN B-12) 1,000 mcg tablet; Take by mouth daily          Subjective:   C/o pinching headache for 1 month  Describes it in front of head  Also, some congestion, neck aches, crackling in ears  Has been traveling  Takes Aleve prn   mjs     Patient ID: Brodie Andrews is a 48 y o  male  HPI:  This is a 59-year-old gentleman that presents to the office with 4-6 weeks of ongoing headache which is locally at bilateral temporal areas  He states the headaches do not get very severe they are a 3/10 on a pain scale better pinching and bothersome in nature  He has also had some sinus congestion, occasional dizziness, and neck aches lately  He has not had any major change in his in his routine with the exception of knee surgery in November and starting to exercise more regularly  He maintains the same diet and caffeine consumption  The following portions of the patient's history were reviewed and updated as appropriate: allergies, current medications, past family history, past medical history, past social history, past surgical history and problem list     Review of Systems   Constitutional: Negative for chills, fatigue and fever  HENT: Negative for congestion, ear pain and sinus pressure  Eyes: Negative for visual disturbance  Respiratory: Negative for cough, chest tightness and shortness of breath  Cardiovascular: Negative for chest pain and palpitations  Gastrointestinal: Negative for diarrhea, nausea and vomiting  Endocrine: Negative for polyuria  Genitourinary: Negative for dysuria and frequency     Musculoskeletal: Negative for arthralgias and myalgias  Skin: Negative for pallor and rash  Neurological: Negative for dizziness, weakness, light-headedness, numbness and headaches  Psychiatric/Behavioral: Negative for agitation, behavioral problems and sleep disturbance  All other systems reviewed and are negative  Objective:      /70   Pulse 80   Temp (!) 97 4 °F (36 3 °C)   Ht 5' 9" (1 753 m)   Wt 84 8 kg (187 lb)   BMI 27 62 kg/m²          Physical Exam   Constitutional: He is oriented to person, place, and time  He appears well-developed and well-nourished  No distress  HENT:   Head: Normocephalic and atraumatic  Right Ear: External ear normal    Left Ear: External ear normal    Nose: Nose normal    Mouth/Throat: Oropharynx is clear and moist  No oropharyngeal exudate  Turbinates are erythematous and edematous bilaterally  Post nasal drip of the posterior pharynx noted  Eyes: Pupils are equal, round, and reactive to light  Conjunctivae and EOM are normal  Right eye exhibits no discharge  Left eye exhibits no discharge  Neck: Normal range of motion  Neck supple  No tracheal deviation present  No thyromegaly present  Cardiovascular: Normal rate, regular rhythm and normal heart sounds  Exam reveals no friction rub  No murmur heard  Pulmonary/Chest: Effort normal and breath sounds normal  No respiratory distress  He has no wheezes  He has no rales  Abdominal: Soft  Bowel sounds are normal  He exhibits no distension  There is no tenderness  There is no rebound and no guarding  Musculoskeletal: Normal range of motion  He exhibits no edema or tenderness  Lymphadenopathy:     He has no cervical adenopathy  Neurological: He is alert and oriented to person, place, and time  No cranial nerve deficit  Coordination normal    Skin: Skin is warm and dry  No rash noted  No erythema  Psychiatric: He has a normal mood and affect  His behavior is normal  Thought content normal    Nursing note and vitals reviewed

## 2019-01-22 NOTE — PATIENT INSTRUCTIONS
1   Headache-differential includes tension headache, chronic sinusitis, thyroid or electrolyte abnormality  Will also assess Lyme disease panel and rule out temporal arteritis with sed rate  Recommend starting on Augmentin 875 mg twice daily for 10 days for probable sinusitis  If symptoms are not improving and labs are all normal consider further evaluation with CT imaging  2  Chronic sinusitis-patient with some symptoms of congestion, neck aches, dizziness  Ongoing symptoms for the past month  Treatment as above with Augmentin  3  Hypothyroidism-status unknown-patient continues levothyroxine 25 mcg daily  4  Mixed hyperlipidemia-will reassess labs  Continue fenofibrate

## 2019-02-11 ENCOUNTER — OFFICE VISIT (OUTPATIENT)
Dept: FAMILY MEDICINE CLINIC | Facility: CLINIC | Age: 54
End: 2019-02-11
Payer: COMMERCIAL

## 2019-02-11 VITALS
DIASTOLIC BLOOD PRESSURE: 70 MMHG | BODY MASS INDEX: 27.25 KG/M2 | WEIGHT: 184 LBS | SYSTOLIC BLOOD PRESSURE: 122 MMHG | HEART RATE: 72 BPM | HEIGHT: 69 IN

## 2019-02-11 DIAGNOSIS — R51.9 NONINTRACTABLE HEADACHE, UNSPECIFIED CHRONICITY PATTERN, UNSPECIFIED HEADACHE TYPE: ICD-10-CM

## 2019-02-11 DIAGNOSIS — R19.7 DIARRHEA, UNSPECIFIED TYPE: Primary | ICD-10-CM

## 2019-02-11 DIAGNOSIS — E03.9 HYPOTHYROIDISM, UNSPECIFIED TYPE: ICD-10-CM

## 2019-02-11 DIAGNOSIS — R11.0 NAUSEA: ICD-10-CM

## 2019-02-11 PROCEDURE — 99214 OFFICE O/P EST MOD 30 MIN: CPT | Performed by: PHYSICIAN ASSISTANT

## 2019-02-11 PROCEDURE — 1036F TOBACCO NON-USER: CPT | Performed by: PHYSICIAN ASSISTANT

## 2019-02-11 PROCEDURE — 3008F BODY MASS INDEX DOCD: CPT | Performed by: PHYSICIAN ASSISTANT

## 2019-02-11 NOTE — PATIENT INSTRUCTIONS
1   Diarrhea-persistent x3 weeks-post antibiotic therapy from Augmentin-recommend assessing C diff culture as well as stool culture and ova and parasite exam   Continue probiotic therapies  Treat as necessary  2  Nausea-likely associated with infection as above  3  Sinus headache-symptoms persist despite antibiotic therapy  Recommend x-ray of the sinuses and consider further evaluation by allergist or Otolaryngology as necessary  4  Hypothyroidism-stable, no medication changes  TSH completed earlier this month was within normal limits

## 2019-02-11 NOTE — PROGRESS NOTES
Assessment/Plan:  Patient Instructions   1  Diarrhea-persistent x3 weeks-post antibiotic therapy from Augmentin-recommend assessing C diff culture as well as stool culture and ova and parasite exam   Continue probiotic therapies  Treat as necessary  2  Nausea-likely associated with infection as above  3  Sinus headache-symptoms persist despite antibiotic therapy  Recommend x-ray of the sinuses and consider further evaluation by allergist or Otolaryngology as necessary  4  Hypothyroidism-stable, no medication changes  TSH completed earlier this month was within normal limits  Diagnoses and all orders for this visit:    Diarrhea, unspecified type  -     Stool Enteric Bacterial Panel by PCR; Future  -     Clostridium difficile toxin by PCR; Future  -     Ova and parasite examination; Future    Nausea  -     Stool Enteric Bacterial Panel by PCR; Future  -     Clostridium difficile toxin by PCR; Future  -     Ova and parasite examination; Future    Hypothyroidism, unspecified type    Nonintractable headache, unspecified chronicity pattern, unspecified headache type  -     XR sinuses < 3 vw; Future          Subjective: pt is here for gi issues since being on augmentin last month  He states he has been off it for two weeks but is still experience diarrhea, cramping and nausea~cd     Patient ID: Torey Melton is a 48 y o  male  HPI:  This is a 77-year-old gentleman that presents to the office with stomach cramps and frequent diarrhea at least 6 times per day which is mostly watery with occasional mucus  This is been going on for almost 3 weeks  Patient was on antibiotic therapy with Augmentin twice daily  Symptoms started around the same time but did not stop after Augmentin was discontinued  He has been taking some probiotics over-the-counter without much benefit  He was on Augmentin therapy for ongoing headaches and sinus congestion    Does not feel that the antibiotics seemed to help with this       The following portions of the patient's history were reviewed and updated as appropriate: allergies, current medications, past family history, past medical history, past social history, past surgical history and problem list     Review of Systems   Constitutional: Negative for fever  HENT: Positive for congestion  Respiratory: Negative for cough, chest tightness and shortness of breath  Cardiovascular: Negative for chest pain  Gastrointestinal: Positive for abdominal distention, diarrhea and nausea  Negative for blood in stool and vomiting  Musculoskeletal: Negative for arthralgias  Neurological: Positive for headaches  Objective:    Vitals:    02/11/19 1423   BP: 122/70   BP Location: Left arm   Patient Position: Sitting   Cuff Size: Large   Pulse: 72   Weight: 83 5 kg (184 lb)   Height: 5' 9" (1 753 m)                Physical Exam   Constitutional: He is oriented to person, place, and time  He appears well-developed and well-nourished  No distress  HENT:   Head: Normocephalic and atraumatic  Right Ear: External ear normal    Left Ear: External ear normal    Nose: Nose normal    Mouth/Throat: Oropharynx is clear and moist  No oropharyngeal exudate  Turbinates are erythematous and edematous bilaterally  Eyes: Pupils are equal, round, and reactive to light  Conjunctivae and EOM are normal    Neck: Normal range of motion  Neck supple  No tracheal deviation present  No thyromegaly present  Cardiovascular: Normal rate, regular rhythm and normal heart sounds  Exam reveals no friction rub  No murmur heard  Pulmonary/Chest: Effort normal and breath sounds normal  No respiratory distress  He has no wheezes  He has no rales  Abdominal: Soft  Bowel sounds are normal  He exhibits no distension  There is no tenderness  There is no rebound and no guarding  Musculoskeletal: Normal range of motion  He exhibits no edema or tenderness     Lymphadenopathy:     He has no cervical adenopathy  Neurological: He is alert and oriented to person, place, and time  No cranial nerve deficit  Coordination normal    Skin: Skin is warm and dry  No rash noted  No erythema  Psychiatric: He has a normal mood and affect  His behavior is normal  Thought content normal    Nursing note and vitals reviewed

## 2019-02-18 ENCOUNTER — TELEPHONE (OUTPATIENT)
Dept: OTHER | Facility: OTHER | Age: 54
End: 2019-02-18

## 2019-02-18 ENCOUNTER — DOCUMENTATION (OUTPATIENT)
Dept: FAMILY MEDICINE CLINIC | Facility: CLINIC | Age: 54
End: 2019-02-18

## 2019-02-19 ENCOUNTER — TELEPHONE (OUTPATIENT)
Dept: FAMILY MEDICINE CLINIC | Facility: CLINIC | Age: 54
End: 2019-02-19

## 2019-02-19 DIAGNOSIS — A04.72 C. DIFFICILE DIARRHEA: Primary | ICD-10-CM

## 2019-02-19 RX ORDER — METRONIDAZOLE 500 MG/1
500 TABLET ORAL EVERY 8 HOURS SCHEDULED
Qty: 21 TABLET | Refills: 0 | Status: SHIPPED | OUTPATIENT
Start: 2019-02-19 | End: 2019-02-26

## 2019-02-19 NOTE — TELEPHONE ENCOUNTER
Call from [address] [place] [address] lab shows C diff is positive  Would recommend starting patient on Flagyl 500 mg 3 times daily for 7 days  I will send this in to his [facility] on [address]  Please make sure that patient is aware that he cannot drink any alcohol while on this medication

## 2019-02-19 NOTE — TELEPHONE ENCOUNTER
Dr Prince Mims called in to service and Transferred him [facility]  Regarding lab results

## 2019-02-19 NOTE — TELEPHONE ENCOUNTER
[facility] called in for Stat results for the patient tired to connect the rep to on the on call  [facility] [place] goes to voice mail  That is  full

## 2019-02-19 NOTE — TELEPHONE ENCOUNTER
Indira with [facility] labs called again concerning  labs results for the patient called Mickie MILES left message

## 2019-02-23 DIAGNOSIS — E03.9 HYPOTHYROIDISM, UNSPECIFIED TYPE: ICD-10-CM

## 2019-02-25 RX ORDER — LEVOTHYROXINE SODIUM 0.03 MG/1
TABLET ORAL
Qty: 90 TABLET | Refills: 0 | Status: SHIPPED | OUTPATIENT
Start: 2019-02-25 | End: 2019-05-26 | Stop reason: SDUPTHER

## 2019-04-06 DIAGNOSIS — E78.2 MIXED HYPERLIPIDEMIA: ICD-10-CM

## 2019-04-08 RX ORDER — FENOFIBRATE 160 MG/1
TABLET ORAL
Qty: 90 TABLET | Refills: 1 | Status: SHIPPED | OUTPATIENT
Start: 2019-04-08 | End: 2019-10-05 | Stop reason: SDUPTHER

## 2019-05-26 DIAGNOSIS — E03.9 HYPOTHYROIDISM, UNSPECIFIED TYPE: ICD-10-CM

## 2019-05-28 RX ORDER — LEVOTHYROXINE SODIUM 0.03 MG/1
TABLET ORAL
Qty: 90 TABLET | Refills: 0 | Status: SHIPPED | OUTPATIENT
Start: 2019-05-28 | End: 2019-08-26 | Stop reason: SDUPTHER

## 2019-08-26 DIAGNOSIS — E03.9 HYPOTHYROIDISM, UNSPECIFIED TYPE: ICD-10-CM

## 2019-08-26 RX ORDER — LEVOTHYROXINE SODIUM 0.03 MG/1
TABLET ORAL
Qty: 90 TABLET | Refills: 4 | Status: SHIPPED | OUTPATIENT
Start: 2019-08-26 | End: 2020-11-18

## 2019-09-03 ENCOUNTER — OFFICE VISIT (OUTPATIENT)
Dept: FAMILY MEDICINE CLINIC | Facility: CLINIC | Age: 54
End: 2019-09-03
Payer: COMMERCIAL

## 2019-09-03 VITALS
SYSTOLIC BLOOD PRESSURE: 144 MMHG | HEIGHT: 69 IN | BODY MASS INDEX: 25.92 KG/M2 | HEART RATE: 68 BPM | WEIGHT: 175 LBS | DIASTOLIC BLOOD PRESSURE: 90 MMHG

## 2019-09-03 DIAGNOSIS — Z12.5 SCREENING FOR PROSTATE CANCER: ICD-10-CM

## 2019-09-03 DIAGNOSIS — E03.9 HYPOTHYROIDISM, UNSPECIFIED TYPE: ICD-10-CM

## 2019-09-03 DIAGNOSIS — E78.2 MIXED HYPERLIPIDEMIA: ICD-10-CM

## 2019-09-03 DIAGNOSIS — L91.8 SKIN TAG: Primary | ICD-10-CM

## 2019-09-03 PROCEDURE — 99214 OFFICE O/P EST MOD 30 MIN: CPT | Performed by: PHYSICIAN ASSISTANT

## 2019-09-03 NOTE — PATIENT INSTRUCTIONS
1   Skin tag of the axilla-this has resolved itself, falling off  There is no residual visible  2  Hyperlipidemia-presently stable on fenofibrate and with diet and exercise  Reassess labs  3  Hypothyroidism-previously stable on levothyroxine 25 mcg daily  Will reassess labs  Follow-up as necessary  4  Screening for prostate cancer-will assess PSA with next labs  There are no symptoms present

## 2019-09-03 NOTE — PROGRESS NOTES
Assessment and Plan:  Patient Instructions   1  Skin tag of the axilla-this has resolved itself, falling off  There is no residual visible  2  Hyperlipidemia-presently stable on fenofibrate and with diet and exercise  Reassess labs  3  Hypothyroidism-previously stable on levothyroxine 25 mcg daily  Will reassess labs  Follow-up as necessary  4  Screening for prostate cancer-will assess PSA with next labs  There are no symptoms present  Diagnoses and all orders for this visit:    Skin tag    Mixed hyperlipidemia  -     Lipid Panel with Direct LDL reflex  -     Comprehensive metabolic panel  -     CBC and differential    Hypothyroidism, unspecified type    Screening for prostate cancer  -     PSA, Total Screen              Subjective:      Patient ID: Edwin Scott is a 48 y o  male  CC:    Chief Complaint   Patient presents with    Skin Lesion     A skin tag fell off his left axilla area  mjs    Follow-up       HPI:    HPI:  This is a 27-year-old gentleman that presents to the office with concerns over a skin tag of his left axillary area  He states that it started to turn black and he had some concern about it but then actually fell off by itself  There is really nothing left behind  He thought that while he was here and had the appointment he would discuss any blood work that is due  He does have a history of hypothyroidism which has been stable with levothyroxine 25 mcg daily and he is currently on fenofibrate for his cholesterol and triglycerides  He continues to exercise regularly and has no other acute health concerns  The following portions of the patient's history were reviewed and updated as appropriate: allergies, current medications, past family history, past medical history, past social history, past surgical history and problem list       Review of Systems   Constitutional: Negative for chills, fatigue and fever     HENT: Negative for congestion, ear pain and sinus pressure  Eyes: Negative for visual disturbance  Respiratory: Negative for cough, chest tightness and shortness of breath  Cardiovascular: Negative for chest pain and palpitations  Gastrointestinal: Negative for diarrhea, nausea and vomiting  Endocrine: Negative for polyuria  Genitourinary: Negative for dysuria and frequency  Musculoskeletal: Negative for arthralgias and myalgias  Skin: Negative for pallor and rash  Neurological: Negative for dizziness, weakness, light-headedness, numbness and headaches  Psychiatric/Behavioral: Negative for agitation, behavioral problems and sleep disturbance  All other systems reviewed and are negative  Data to review:       Objective:    Vitals:    09/03/19 1457   BP: 144/90   Pulse: 68   Weight: 79 4 kg (175 lb)   Height: 5' 9" (1 753 m)        Physical Exam   Constitutional: He is oriented to person, place, and time  He appears well-developed and well-nourished  No distress  HENT:   Head: Normocephalic and atraumatic  Right Ear: External ear normal    Left Ear: External ear normal    Nose: Nose normal    Mouth/Throat: Oropharynx is clear and moist  No oropharyngeal exudate  Eyes: Pupils are equal, round, and reactive to light  Conjunctivae and EOM are normal    Neck: Normal range of motion  Neck supple  No tracheal deviation present  No thyromegaly present  Cardiovascular: Normal rate, regular rhythm and normal heart sounds  Exam reveals no friction rub  No murmur heard  Pulmonary/Chest: Effort normal and breath sounds normal  No respiratory distress  He has no wheezes  He has no rales  Abdominal: Soft  Bowel sounds are normal  He exhibits no distension  There is no tenderness  There is no rebound and no guarding  Musculoskeletal: Normal range of motion  He exhibits no edema or tenderness  Lymphadenopathy:     He has no cervical adenopathy  Neurological: He is alert and oriented to person, place, and time   No cranial nerve deficit  Coordination normal    Skin: Skin is warm and dry  No rash noted  No erythema  Psychiatric: He has a normal mood and affect  His behavior is normal  Thought content normal    Nursing note and vitals reviewed

## 2019-10-05 DIAGNOSIS — E78.2 MIXED HYPERLIPIDEMIA: ICD-10-CM

## 2019-10-06 RX ORDER — FENOFIBRATE 160 MG/1
TABLET ORAL
Qty: 90 TABLET | Refills: 4 | Status: SHIPPED | OUTPATIENT
Start: 2019-10-06 | End: 2020-12-29

## 2019-10-28 ENCOUNTER — OFFICE VISIT (OUTPATIENT)
Dept: FAMILY MEDICINE CLINIC | Facility: CLINIC | Age: 54
End: 2019-10-28
Payer: COMMERCIAL

## 2019-10-28 VITALS
RESPIRATION RATE: 18 BRPM | SYSTOLIC BLOOD PRESSURE: 132 MMHG | HEART RATE: 68 BPM | TEMPERATURE: 98.4 F | BODY MASS INDEX: 26.22 KG/M2 | DIASTOLIC BLOOD PRESSURE: 78 MMHG | HEIGHT: 69 IN | WEIGHT: 177 LBS

## 2019-10-28 DIAGNOSIS — M25.512 ACUTE PAIN OF LEFT SHOULDER: Primary | ICD-10-CM

## 2019-10-28 PROCEDURE — 99214 OFFICE O/P EST MOD 30 MIN: CPT | Performed by: PHYSICIAN ASSISTANT

## 2019-10-28 RX ORDER — DICLOFENAC SODIUM 75 MG/1
75 TABLET, DELAYED RELEASE ORAL 2 TIMES DAILY
Qty: 30 TABLET | Refills: 0 | Status: ON HOLD | OUTPATIENT
Start: 2019-10-28 | End: 2019-12-12 | Stop reason: ALTCHOICE

## 2019-10-28 NOTE — PATIENT INSTRUCTIONS
1  Left shoulder pain-this has been persistent for about 6 weeks now  Would recommend diclofenac 75 mg twice daily for 1 week to see if this will help with inflammation  Recommend continuing ice  Patient will go for x-ray to evaluate and schedule follow-up with orthopedic specialist   Consider possible impingement  2  Hyperlipidemia-presently stable on statin therapy, no medication changes  3  Hypothyroidism-stable with levothyroxine, no medication changes

## 2019-10-28 NOTE — PROGRESS NOTES
Assessment and Plan:  Patient Instructions   1  Left shoulder pain-this has been persistent for about 6 weeks now  Would recommend diclofenac 75 mg twice daily for 1 week to see if this will help with inflammation  Recommend continuing ice  Patient will go for x-ray to evaluate and schedule follow-up with orthopedic specialist   Consider possible impingement  2  Hyperlipidemia-presently stable on statin therapy, no medication changes  3  Hypothyroidism-stable with levothyroxine, no medication changes  Problem List Items Addressed This Visit     None      Visit Diagnoses     Acute pain of left shoulder    -  Primary    Relevant Medications    diclofenac (VOLTAREN) 75 mg EC tablet    Other Relevant Orders    Ambulatory referral to Orthopedic Surgery    XR shoulder 2+ vw left                 Diagnoses and all orders for this visit:    Acute pain of left shoulder  -     Ambulatory referral to Orthopedic Surgery; Future  -     XR shoulder 2+ vw left; Future  -     diclofenac (VOLTAREN) 75 mg EC tablet; Take 1 tablet (75 mg total) by mouth 2 (two) times a day              Subjective:      Patient ID: Sharon Garcia is a 47 y o  male  CC:    Chief Complaint   Patient presents with    Shoulder Pain     Patient present today for left shoulder pain for the last month  Per pt he goes to the gym and he believes he moght of had pulled a muscle  HPI:    HPI:  This is a 49-year-old gentleman that presents to the office for concerns over pain in his left shoulder for the past 6 weeks or so  Does not recall any specific injury to the area but feels as though the pain has been persistent if not worsening  He has tried things like ice and Biofreeze without much benefit  He has occasionally been waking from the pain when rolling on the shoulder at nighttime  He has pain when abducting it greater than 90° but is able to do so  He has some pain when pulling his arm across the front of his body    He also has limited range of motion with internal and external rotation  He has tried some ibuprofen occasionally when he needs it for the discomfort  The following portions of the patient's history were reviewed and updated as appropriate: allergies, current medications, past family history, past medical history, past social history, past surgical history and problem list       Review of Systems   Constitutional: Negative for chills, fatigue and fever  HENT: Negative for congestion, ear pain and sinus pressure  Eyes: Negative for visual disturbance  Respiratory: Negative for cough, chest tightness and shortness of breath  Cardiovascular: Negative for chest pain and palpitations  Gastrointestinal: Negative for diarrhea, nausea and vomiting  Endocrine: Negative for polyuria  Genitourinary: Negative for dysuria and frequency  Musculoskeletal: Positive for arthralgias  Negative for myalgias  Skin: Negative for pallor and rash  Neurological: Negative for dizziness, weakness, light-headedness, numbness and headaches  Psychiatric/Behavioral: Negative for agitation, behavioral problems and sleep disturbance  All other systems reviewed and are negative  Data to review:       Objective:    Vitals:    10/28/19 1529   BP: 132/78   BP Location: Left arm   Patient Position: Sitting   Cuff Size: Large   Pulse: 68   Resp: 18   Temp: 98 4 °F (36 9 °C)   TempSrc: Temporal   Weight: 80 3 kg (177 lb)   Height: 5' 9" (1 753 m)        Physical Exam   Constitutional: He is oriented to person, place, and time  He appears well-developed and well-nourished  HENT:   Head: Normocephalic  Cardiovascular: Normal rate and regular rhythm  Pulmonary/Chest: Effort normal and breath sounds normal  No respiratory distress  Abdominal: Soft  Musculoskeletal: Normal range of motion  He exhibits tenderness  Patient does have tenderness at the anterior joint space of the left shoulder    There are some positive impingement signs and pain with bring his arm across the front of his body  Patient also has limited range of motion with internal and external rotation of the shoulder  He is able to abduct about 110°  There is pain with forced abduction at 90°  Neurological: He is alert and oriented to person, place, and time  Psychiatric: He has a normal mood and affect

## 2019-11-05 ENCOUNTER — OFFICE VISIT (OUTPATIENT)
Dept: OBGYN CLINIC | Facility: MEDICAL CENTER | Age: 54
End: 2019-11-05
Payer: COMMERCIAL

## 2019-11-05 ENCOUNTER — APPOINTMENT (OUTPATIENT)
Dept: RADIOLOGY | Facility: MEDICAL CENTER | Age: 54
End: 2019-11-05
Payer: COMMERCIAL

## 2019-11-05 VITALS
HEIGHT: 70 IN | DIASTOLIC BLOOD PRESSURE: 79 MMHG | BODY MASS INDEX: 25.2 KG/M2 | WEIGHT: 176 LBS | SYSTOLIC BLOOD PRESSURE: 144 MMHG | HEART RATE: 63 BPM

## 2019-11-05 DIAGNOSIS — M25.512 ACUTE PAIN OF LEFT SHOULDER: Primary | ICD-10-CM

## 2019-11-05 DIAGNOSIS — M25.512 ACUTE PAIN OF LEFT SHOULDER: ICD-10-CM

## 2019-11-05 PROCEDURE — 99203 OFFICE O/P NEW LOW 30 MIN: CPT | Performed by: ORTHOPAEDIC SURGERY

## 2019-11-05 PROCEDURE — 73030 X-RAY EXAM OF SHOULDER: CPT

## 2019-11-05 NOTE — PROGRESS NOTES
Orthopaedic Surgery - Office Note  Renetta Collier (89 y o  male)   : 1965   MRN: [id number]  Encounter Date: 2019    Chief Complaint   Patient presents with    Left Shoulder - Pain       Assessment / Plan  Acute left shoulder pain suspected for rotator cuff pathology     · MRI of left shoulder  Return for Recheck after MRI  History of Present Illness  Renetta Collier is a 47 y o  male who presents with acute left shoulder pain that started in September  Patient states he is a very active individual and goes to the gym daily to lift weights and run on the treadmill and noticed that he is unable to lift weights at this time  Patient states he has been treating over the past month or so with base, rest and Biofreeze without relief of his symptoms  Patient states that he notices significant loss of range of motion to his left shoulder and states that he is constantly being woken up at night due to his pain in his left shoulder  Patient does describe this pain radiating down his left arm to about the level of his elbow  He also describes difficulties putting on a shirt, putting on his socks as well as washing his hair in the shower  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 and is unable to climb ladders at this time  Review of Systems  Pertinent items are noted in HPI  All other systems were reviewed and are negative  Physical Exam  /79   Pulse 63   Ht 5' 10" (1 778 m)   Wt 79 8 kg (176 lb)   BMI 25 25 kg/m²   Cons: Appears well  No apparent distress  Psych: Alert  Oriented x3  Mood and affect normal   Eyes: PERRLA, EOMI  Resp: Normal effort  No audible wheezing or stridor  CV: Palpable pulse  No discernable arrhythmia  No LE edema  Lymph:  No palpable cervical, axillary, or inguinal lymphadenopathy  Skin: Warm  No palpable masses  No visible lesions  Neuro: Normal muscle tone  Normal and symmetric DTR's  Left Shoulder Exam  Alignment / Posture:  Normal shoulder posture  Inspection:  mild anterior shoulder swelling  Palpation:  subacromial bursa tenderness  minimal bicipital groove tenderness  ROM:  Shoulder FE passive 140, active 90  Shoulder ER 70  Shoulder IR T8  Strength:  Supraspinatus 4-/5  Infraspinatus 4+/5  Subscapularis 5/5  Stability:  No objective shoulder instability  Tests: (+) Valenzuela  (+) Neer  (+) Speed  (-) Belly press  Neurovascular:  Sensation intact in Ax/R/M/U nerve distributions  2+ radial pulse  Studies Reviewed  I have personally reviewed pertinent films in PACS and my interpretation is xray of left shoulder on 11/5/19 demonstrates osteophyte formation located at the inferior glenoid fossa  Procedures  No procedures today  Medical, Surgical, Family, and Social History  The patient's medical history, family history, and social history, were reviewed and updated as appropriate  History reviewed  No pertinent past medical history  History reviewed  No pertinent surgical history      Family History   Problem Relation Age of Onset    Coronary artery disease Mother     Hyperlipidemia Brother     Coronary artery disease Family        Social History     Occupational History    Occupation: EMPLOYED   Tobacco Use    Smoking status: Never Smoker    Smokeless tobacco: Never Used    Tobacco comment: NO SECOND HAND SMOKE EXPOSURE   Substance and Sexual Activity    Alcohol use: No    Drug use: No    Sexual activity: Not on file       Allergies   Allergen Reactions    Simvastatin Myalgia         Current Outpatient Medications:     aspirin (ECOTRIN LOW STRENGTH) 81 mg EC tablet, Take 81 mg by mouth daily, Disp: , Rfl:     cyanocobalamin (VITAMIN B-12) 1,000 mcg tablet, Take by mouth daily, Disp: , Rfl:     diclofenac (VOLTAREN) 75 mg EC tablet, Take 1 tablet (75 mg total) by mouth 2 (two) times a day, Disp: 30 tablet, Rfl: 0    fenofibrate (TRIGLIDE) 160 MG tablet, TAKE 1 TABLET DAILY, Disp: 90 tablet, Rfl: 4    levothyroxine 25 mcg tablet, TAKE 1 TABLET DAILY (NEED OFFICE VISIT), Disp: 90 tablet, Rfl: 4    sildenafil (VIAGRA) 100 mg tablet, Take 1 tablet (100 mg total) by mouth daily as needed for erectile dysfunction, Disp: 10 tablet, Rfl: 5      Scribe Attestation    I,:   Maggi Andino am acting as a scribe while in the presence of the attending physician :        I,:   Zee Goins MD personally performed the services described in this documentation    as scribed in my presence :

## 2019-11-07 ENCOUNTER — TELEPHONE (OUTPATIENT)
Dept: FAMILY MEDICINE CLINIC | Facility: CLINIC | Age: 54
End: 2019-11-07

## 2019-11-07 DIAGNOSIS — M25.512 ACUTE PAIN OF LEFT SHOULDER: Primary | ICD-10-CM

## 2019-11-07 RX ORDER — PREDNISONE 20 MG/1
60 TABLET ORAL DAILY
Qty: 15 TABLET | Refills: 0 | Status: SHIPPED | OUTPATIENT
Start: 2019-11-07 | End: 2019-11-12

## 2019-11-07 RX ORDER — GABAPENTIN 300 MG/1
300 CAPSULE ORAL
Qty: 30 CAPSULE | Refills: 0 | Status: ON HOLD | OUTPATIENT
Start: 2019-11-07 | End: 2019-12-12 | Stop reason: ALTCHOICE

## 2019-11-07 NOTE — TELEPHONE ENCOUNTER
I would recommend he take prednisone in the morning with food and I will also send in a prescription for gabapentin which he can use at nighttime for pain and it will make him drowsy

## 2019-11-07 NOTE — TELEPHONE ENCOUNTER
Patient is concernd because he is unable to sleep and doesn't know if Prednisone is going to keep him up  Can you give him something to take away the pain and swelling and lets him sleep?

## 2019-11-07 NOTE — TELEPHONE ENCOUNTER
PT SAW MS LAST WEEK AND WAS GIVEN PAIN MED BUT IT IS NOT REDUCING IT AT ALL  PT HAS AN MRI NEXT WEEK AND WILL ALSO BE SEEING ORTHO NEXT Friday  CAN WE PLEASE GIVE HIM SOMETHING ELSE FOR THE PAIN/PT IS NOT SLEEPING EITHER  PLEASE ADVISE  THANK YOU

## 2019-11-07 NOTE — TELEPHONE ENCOUNTER
Would recommend trial of Prednisone steroid for pain and inflammation    Will send to [address]

## 2019-11-13 ENCOUNTER — HOSPITAL ENCOUNTER (OUTPATIENT)
Dept: MRI IMAGING | Facility: HOSPITAL | Age: 54
Discharge: HOME | End: 2019-11-13
Attending: ORTHOPAEDIC SURGERY
Payer: COMMERCIAL

## 2019-11-13 DIAGNOSIS — M25.512 ACUTE PAIN OF LEFT SHOULDER: ICD-10-CM

## 2019-11-13 PROCEDURE — 73221 MRI JOINT UPR EXTREM W/O DYE: CPT

## 2019-11-15 ENCOUNTER — OFFICE VISIT (OUTPATIENT)
Dept: OBGYN CLINIC | Facility: MEDICAL CENTER | Age: 54
End: 2019-11-15
Payer: COMMERCIAL

## 2019-11-15 VITALS
DIASTOLIC BLOOD PRESSURE: 87 MMHG | HEART RATE: 65 BPM | BODY MASS INDEX: 25.2 KG/M2 | WEIGHT: 176 LBS | HEIGHT: 70 IN | SYSTOLIC BLOOD PRESSURE: 144 MMHG

## 2019-11-15 DIAGNOSIS — M75.122 COMPLETE TEAR OF LEFT ROTATOR CUFF, UNSPECIFIED WHETHER TRAUMATIC: Primary | ICD-10-CM

## 2019-11-15 PROCEDURE — 99214 OFFICE O/P EST MOD 30 MIN: CPT | Performed by: ORTHOPAEDIC SURGERY

## 2019-11-15 RX ORDER — CEFAZOLIN SODIUM 1 G/50ML
1000 SOLUTION INTRAVENOUS ONCE
Status: CANCELLED | OUTPATIENT
Start: 2019-12-12 | End: 2019-11-15

## 2019-11-15 NOTE — H&P (VIEW-ONLY)
Orthopaedic Surgery - Office Note  Nir Olson (73 y o  male)   : 1965   MRN: [id number]  Encounter Date: 11/15/2019    Chief Complaint   Patient presents with    Left Shoulder - Follow-up       Assessment / Plan  Left shoulder rotator cuff tear    · Patient can continue with activity as tolerated at this time  · Patient should continue with home exercise program for range of motion and strengthening exercises  · After discussion of risk and benefits the patient agreed that we will proceed with a left shoulder arthroscopy with rotator cuff repair and possible biceps tenodesis  Consent was signed in the office today and surgery will be scheduled in the near future  · The patient was fitted with the abduction pillow sling which they will bring with them to surgery for postoperative use  · Patient can continue with ice and analgesics as needed  · Patient was given a prescription for physical therapy which should be scheduled to start 1 week postoperatively  Return for Follow-up 4-5 days postoperatively  Alyson HathawayAvenel History of Present Illness  Nir Olson is a 47 y o  male follow-up for acute left shoulder pain that started in September  Patient did have MRI study done since last visit and is here for the follow-up  Patient is a very active individual and goes to the gym daily to lift weights and run on the treadmill and noticed that he is unable to lift weights at this time though he has continue to try to strengthen his shoulder  He was given home exercise program at his last visit and has been attempting to do these exercises without any improvement in the shoulder  Patient states he has been treating over the past 2 months with ice, rest and Biofreeze without relief of his symptoms  Patient is also taken anti-inflammatories such as Advil regularly without any significant change    Patient states that he notices significant loss of range of motion to his left shoulder and states that he is constantly being woken up at night due to his pain in his left shoulder  Patient does describe this pain radiating down his left arm to about the level of his elbow  He also describes difficulties putting on a shirt, putting on his socks as well as washing his hair in the shower  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 and is unable to climb ladders at this time  He has multiple episodes of sharp pain in his shoulder when performing activity even activity this not overhead  Review of Systems  Pertinent items are noted in HPI  All other systems were reviewed and are negative  Physical Exam  /87   Pulse 65   Ht 5' 10" (1 778 m)   Wt 79 8 kg (176 lb)   BMI 25 25 kg/m²   Cons: Appears well  No apparent distress  Psych: Alert  Oriented x3  Mood and affect normal   Eyes: PERRLA, EOMI  Resp: Normal effort  No audible wheezing or stridor  CV: Palpable pulse  No discernable arrhythmia  No LE edema  Lymph:  No palpable cervical, axillary, or inguinal lymphadenopathy  Skin: Warm  No palpable masses  No visible lesions  Neuro: Normal muscle tone  Normal and symmetric DTR's  Left Shoulder Exam  Alignment / Posture:  Normal shoulder posture  Inspection:  mild anterior shoulder swelling  Palpation:  subacromial bursa tenderness  minimal bicipital groove tenderness  ROM:  Shoulder FE passive 140, active 90  Shoulder ER 70  Shoulder IR T8  Strength:  Supraspinatus 4-/5  Infraspinatus 4+/5  Subscapularis 5/5  Stability:  No objective shoulder instability  Tests: (+) Valenzuela  (+) Neer  (+) Speed  (-) Belly press  Neurovascular:  Sensation intact in Ax/R/M/U nerve distributions  2+ radial pulse  Studies Reviewed  I have personally reviewed pertinent films in PACS and my interpretation is xray of left shoulder on 11/5/19 demonstrates osteophyte formation located at the inferior glenoid fossa       MRI study of the left shoulder from 11/13/2019 shows full-thickness tear of the supraspinatus measuring 1 5 cm in transverse and 1 4 cm in anterior-posterior dimension  There is a large fluid collection in the area of the bursa with several loose bodies noted in the fluid  Per radiology the largest loose body measured 8 mm  Tendinosis of long head of the biceps is also noted at this time  Procedures  No procedures today  Medical, Surgical, Family, and Social History  The patient's medical history, family history, and social history, were reviewed and updated as appropriate  History reviewed  No pertinent past medical history  History reviewed  No pertinent surgical history      Family History   Problem Relation Age of Onset    Coronary artery disease Mother     Hyperlipidemia Brother     Coronary artery disease Family        Social History     Occupational History    Occupation: EMPLOYED   Tobacco Use    Smoking status: Never Smoker    Smokeless tobacco: Never Used    Tobacco comment: NO SECOND HAND SMOKE EXPOSURE   Substance and Sexual Activity    Alcohol use: No    Drug use: No    Sexual activity: Not on file       Allergies   Allergen Reactions    Simvastatin Myalgia         Current Outpatient Medications:     aspirin (ECOTRIN LOW STRENGTH) 81 mg EC tablet, Take 81 mg by mouth daily, Disp: , Rfl:     cyanocobalamin (VITAMIN B-12) 1,000 mcg tablet, Take by mouth daily, Disp: , Rfl:     diclofenac (VOLTAREN) 75 mg EC tablet, Take 1 tablet (75 mg total) by mouth 2 (two) times a day, Disp: 30 tablet, Rfl: 0    fenofibrate (TRIGLIDE) 160 MG tablet, TAKE 1 TABLET DAILY, Disp: 90 tablet, Rfl: 4    gabapentin (NEURONTIN) 300 mg capsule, Take 1 capsule (300 mg total) by mouth daily at bedtime, Disp: 30 capsule, Rfl: 0    levothyroxine 25 mcg tablet, TAKE 1 TABLET DAILY (NEED OFFICE VISIT), Disp: 90 tablet, Rfl: 4    sildenafil (VIAGRA) 100 mg tablet, Take 1 tablet (100 mg total) by mouth daily as needed for erectile dysfunction, Disp: 10 tablet, Rfl: 5      Scribe Attestation    I,:    am acting as a scribe while in the presence of the attending physician :        I,:    personally performed the services described in this documentation    as scribed in my presence :

## 2019-11-29 ENCOUNTER — TELEPHONE (OUTPATIENT)
Dept: FAMILY MEDICINE CLINIC | Facility: CLINIC | Age: 54
End: 2019-11-29

## 2019-11-29 NOTE — PRE-PROCEDURE INSTRUCTIONS
Pre-Surgery Instructions:   Medication Instructions    aspirin (ECOTRIN LOW STRENGTH) 81 mg EC tablet Instructed patient per Anesthesia Guidelines   cyanocobalamin (VITAMIN B-12) 1,000 mcg tablet Instructed patient per Anesthesia Guidelines   fenofibrate (TRIGLIDE) 160 MG tablet Instructed patient per Anesthesia Guidelines   levothyroxine 25 mcg tablet Instructed patient per Anesthesia Guidelines  Patient instructed he may take levothyroxine am of surgery with a small sip of water  Patient instructed to avoid NSAIDS, Aspirin, Vitamins, and supplements 7 days prior to surgery  Pre-op bathing and surgery instructions reviewed with patient

## 2019-11-29 NOTE — TELEPHONE ENCOUNTER
I would recommend increasing the gabapentin to 3 times a day and continue the Voltaren  Do this for the weekend until mac comes back next week and give [facility] a call

## 2019-11-29 NOTE — TELEPHONE ENCOUNTER
Patient is having surgery on December 12th on his shoulder  The Gabapentin is not really helping him at night  He wanted to know if he could take something else   Please call him on his cell [phone]

## 2019-12-11 ENCOUNTER — ANESTHESIA EVENT (OUTPATIENT)
Dept: PERIOP | Facility: HOSPITAL | Age: 54
End: 2019-12-11
Payer: COMMERCIAL

## 2019-12-12 ENCOUNTER — ANESTHESIA (OUTPATIENT)
Dept: PERIOP | Facility: HOSPITAL | Age: 54
End: 2019-12-12
Payer: COMMERCIAL

## 2019-12-12 ENCOUNTER — HOSPITAL ENCOUNTER (OUTPATIENT)
Facility: HOSPITAL | Age: 54
Setting detail: OUTPATIENT SURGERY
Discharge: HOME | End: 2019-12-12
Attending: ORTHOPAEDIC SURGERY | Admitting: ORTHOPAEDIC SURGERY
Payer: COMMERCIAL

## 2019-12-12 VITALS
TEMPERATURE: 97.3 F | WEIGHT: 176 LBS | OXYGEN SATURATION: 100 % | BODY MASS INDEX: 25.2 KG/M2 | HEIGHT: 70 IN | DIASTOLIC BLOOD PRESSURE: 69 MMHG | HEART RATE: 68 BPM | RESPIRATION RATE: 18 BRPM | SYSTOLIC BLOOD PRESSURE: 124 MMHG

## 2019-12-12 DIAGNOSIS — M75.122 NONTRAUMATIC COMPLETE TEAR OF LEFT ROTATOR CUFF: Primary | ICD-10-CM

## 2019-12-12 PROCEDURE — C9290 INJ, BUPIVACAINE LIPOSOME: HCPCS | Performed by: ANESTHESIOLOGY

## 2019-12-12 PROCEDURE — C1713 ANCHOR/SCREW BN/BN,TIS/BN: HCPCS | Performed by: ORTHOPAEDIC SURGERY

## 2019-12-12 PROCEDURE — 29826 SHO ARTHRS SRG DECOMPRESSION: CPT | Performed by: ORTHOPAEDIC SURGERY

## 2019-12-12 PROCEDURE — 29827 SHO ARTHRS SRG RT8TR CUF RPR: CPT | Performed by: ORTHOPAEDIC SURGERY

## 2019-12-12 DEVICE — ANCHOR SUT 4.75 X 14MM BCMPS CRKSCR FLLY THRD W/SUTURETAPE: Type: IMPLANTABLE DEVICE | Site: SHOULDER | Status: FUNCTIONAL

## 2019-12-12 DEVICE — ANCHOR SUT 4.75 X 19.1MM W/CLD EYELET SWIVELOCK STRL: Type: IMPLANTABLE DEVICE | Site: SHOULDER | Status: FUNCTIONAL

## 2019-12-12 RX ORDER — OXYCODONE HYDROCHLORIDE 5 MG/1
10 TABLET ORAL EVERY 4 HOURS PRN
Status: DISCONTINUED | OUTPATIENT
Start: 2019-12-12 | End: 2019-12-12 | Stop reason: HOSPADM

## 2019-12-12 RX ORDER — FENTANYL CITRATE 50 UG/ML
INJECTION, SOLUTION INTRAMUSCULAR; INTRAVENOUS
Status: DISCONTINUED | OUTPATIENT
Start: 2019-12-12 | End: 2019-12-12

## 2019-12-12 RX ORDER — EPHEDRINE SULFATE 50 MG/ML
INJECTION INTRAVENOUS AS NEEDED
Status: DISCONTINUED | OUTPATIENT
Start: 2019-12-12 | End: 2019-12-12 | Stop reason: SURG

## 2019-12-12 RX ORDER — GLYCOPYRROLATE 0.2 MG/ML
INJECTION INTRAMUSCULAR; INTRAVENOUS AS NEEDED
Status: DISCONTINUED | OUTPATIENT
Start: 2019-12-12 | End: 2019-12-12 | Stop reason: SURG

## 2019-12-12 RX ORDER — ONDANSETRON 2 MG/ML
4 INJECTION INTRAMUSCULAR; INTRAVENOUS ONCE AS NEEDED
Status: DISCONTINUED | OUTPATIENT
Start: 2019-12-12 | End: 2019-12-12 | Stop reason: HOSPADM

## 2019-12-12 RX ORDER — MIDAZOLAM HYDROCHLORIDE 2 MG/2ML
INJECTION, SOLUTION INTRAMUSCULAR; INTRAVENOUS
Status: COMPLETED | OUTPATIENT
Start: 2019-12-12 | End: 2019-12-12

## 2019-12-12 RX ORDER — FENTANYL CITRATE/PF 50 MCG/ML
25 SYRINGE (ML) INJECTION
Status: DISCONTINUED | OUTPATIENT
Start: 2019-12-12 | End: 2019-12-12 | Stop reason: HOSPADM

## 2019-12-12 RX ORDER — BUPIVACAINE HYDROCHLORIDE 5 MG/ML
INJECTION, SOLUTION PERINEURAL
Status: COMPLETED | OUTPATIENT
Start: 2019-12-12 | End: 2019-12-12

## 2019-12-12 RX ORDER — SODIUM CHLORIDE 9 MG/ML
125 INJECTION, SOLUTION INTRAVENOUS CONTINUOUS
Status: DISCONTINUED | OUTPATIENT
Start: 2019-12-12 | End: 2019-12-12 | Stop reason: HOSPADM

## 2019-12-12 RX ORDER — MIDAZOLAM HYDROCHLORIDE 2 MG/2ML
INJECTION, SOLUTION INTRAMUSCULAR; INTRAVENOUS AS NEEDED
Status: DISCONTINUED | OUTPATIENT
Start: 2019-12-12 | End: 2019-12-12 | Stop reason: SURG

## 2019-12-12 RX ORDER — CEFAZOLIN SODIUM 1 G/50ML
1000 SOLUTION INTRAVENOUS ONCE
Status: COMPLETED | OUTPATIENT
Start: 2019-12-12 | End: 2019-12-12

## 2019-12-12 RX ORDER — ONDANSETRON 4 MG/1
4 TABLET, ORALLY DISINTEGRATING ORAL EVERY 8 HOURS PRN
Qty: 15 TABLET | Refills: 0 | Status: SHIPPED | OUTPATIENT
Start: 2019-12-12 | End: 2020-09-01 | Stop reason: ALTCHOICE

## 2019-12-12 RX ORDER — OXYCODONE HYDROCHLORIDE 5 MG/1
5 TABLET ORAL EVERY 4 HOURS PRN
Qty: 40 TABLET | Refills: 0 | Status: SHIPPED | OUTPATIENT
Start: 2019-12-12 | End: 2019-12-22

## 2019-12-12 RX ORDER — OXYCODONE HYDROCHLORIDE 5 MG/1
5 TABLET ORAL EVERY 4 HOURS PRN
Status: DISCONTINUED | OUTPATIENT
Start: 2019-12-12 | End: 2019-12-12 | Stop reason: HOSPADM

## 2019-12-12 RX ORDER — ONDANSETRON 2 MG/ML
INJECTION INTRAMUSCULAR; INTRAVENOUS AS NEEDED
Status: DISCONTINUED | OUTPATIENT
Start: 2019-12-12 | End: 2019-12-12 | Stop reason: SURG

## 2019-12-12 RX ORDER — NEOSTIGMINE METHYLSULFATE 1 MG/ML
INJECTION INTRAVENOUS AS NEEDED
Status: DISCONTINUED | OUTPATIENT
Start: 2019-12-12 | End: 2019-12-12 | Stop reason: SURG

## 2019-12-12 RX ORDER — ROCURONIUM BROMIDE 10 MG/ML
INJECTION, SOLUTION INTRAVENOUS AS NEEDED
Status: DISCONTINUED | OUTPATIENT
Start: 2019-12-12 | End: 2019-12-12 | Stop reason: SURG

## 2019-12-12 RX ORDER — MIDAZOLAM HYDROCHLORIDE 2 MG/2ML
INJECTION, SOLUTION INTRAMUSCULAR; INTRAVENOUS
Status: DISCONTINUED | OUTPATIENT
Start: 2019-12-12 | End: 2019-12-12

## 2019-12-12 RX ORDER — PROPOFOL 10 MG/ML
INJECTION, EMULSION INTRAVENOUS AS NEEDED
Status: DISCONTINUED | OUTPATIENT
Start: 2019-12-12 | End: 2019-12-12 | Stop reason: SURG

## 2019-12-12 RX ORDER — FENTANYL CITRATE 50 UG/ML
INJECTION, SOLUTION INTRAMUSCULAR; INTRAVENOUS
Status: COMPLETED | OUTPATIENT
Start: 2019-12-12 | End: 2019-12-12

## 2019-12-12 RX ORDER — FENTANYL CITRATE 50 UG/ML
INJECTION, SOLUTION INTRAMUSCULAR; INTRAVENOUS AS NEEDED
Status: DISCONTINUED | OUTPATIENT
Start: 2019-12-12 | End: 2019-12-12 | Stop reason: SURG

## 2019-12-12 RX ORDER — DEXAMETHASONE SODIUM PHOSPHATE 4 MG/ML
INJECTION, SOLUTION INTRA-ARTICULAR; INTRALESIONAL; INTRAMUSCULAR; INTRAVENOUS; SOFT TISSUE AS NEEDED
Status: DISCONTINUED | OUTPATIENT
Start: 2019-12-12 | End: 2019-12-12 | Stop reason: SURG

## 2019-12-12 RX ORDER — NAPROXEN 500 MG/1
500 TABLET ORAL 2 TIMES DAILY WITH MEALS
Qty: 60 TABLET | Refills: 0 | Status: SHIPPED | OUTPATIENT
Start: 2019-12-12 | End: 2020-09-01

## 2019-12-12 RX ORDER — ROPIVACAINE HYDROCHLORIDE 5 MG/ML
INJECTION, SOLUTION EPIDURAL; INFILTRATION; PERINEURAL
Status: DISCONTINUED | OUTPATIENT
Start: 2019-12-12 | End: 2019-12-12

## 2019-12-12 RX ADMIN — EPHEDRINE SULFATE 5 MG: 50 INJECTION, SOLUTION INTRAVENOUS at 12:44

## 2019-12-12 RX ADMIN — MIDAZOLAM 2 MG: 1 INJECTION INTRAMUSCULAR; INTRAVENOUS at 11:52

## 2019-12-12 RX ADMIN — ROCURONIUM BROMIDE 50 MG: 50 INJECTION, SOLUTION INTRAVENOUS at 12:09

## 2019-12-12 RX ADMIN — ONDANSETRON 4 MG: 2 INJECTION INTRAMUSCULAR; INTRAVENOUS at 12:20

## 2019-12-12 RX ADMIN — GLYCOPYRROLATE 0.6 MG: 0.2 INJECTION INTRAMUSCULAR; INTRAVENOUS at 13:22

## 2019-12-12 RX ADMIN — MIDAZOLAM 2 MG: 1 INJECTION INTRAMUSCULAR; INTRAVENOUS at 12:09

## 2019-12-12 RX ADMIN — BUPIVACAINE HYDROCHLORIDE 10 ML: 5 INJECTION, SOLUTION PERINEURAL at 11:52

## 2019-12-12 RX ADMIN — DEXAMETHASONE SODIUM PHOSPHATE 4 MG: 4 INJECTION, SOLUTION INTRAMUSCULAR; INTRAVENOUS at 12:20

## 2019-12-12 RX ADMIN — LIDOCAINE HYDROCHLORIDE 100 MG: 20 INJECTION, SOLUTION INTRAVENOUS at 12:09

## 2019-12-12 RX ADMIN — NEOSTIGMINE METHYLSULFATE 4 MG: 1 INJECTION, SOLUTION INTRAVENOUS at 13:22

## 2019-12-12 RX ADMIN — GLYCOPYRROLATE 0.2 MG: 0.2 INJECTION INTRAMUSCULAR; INTRAVENOUS at 12:21

## 2019-12-12 RX ADMIN — PROPOFOL 200 MG: 10 INJECTION, EMULSION INTRAVENOUS at 12:09

## 2019-12-12 RX ADMIN — SODIUM CHLORIDE 125 ML/HR: 0.9 INJECTION, SOLUTION INTRAVENOUS at 09:45

## 2019-12-12 RX ADMIN — EPHEDRINE SULFATE 10 MG: 50 INJECTION, SOLUTION INTRAVENOUS at 12:19

## 2019-12-12 RX ADMIN — SODIUM CHLORIDE: 0.9 INJECTION, SOLUTION INTRAVENOUS at 12:42

## 2019-12-12 RX ADMIN — FENTANYL CITRATE 100 MCG: 50 INJECTION, SOLUTION INTRAMUSCULAR; INTRAVENOUS at 11:52

## 2019-12-12 RX ADMIN — CEFAZOLIN SODIUM 2000 MG: 1 SOLUTION INTRAVENOUS at 11:50

## 2019-12-12 RX ADMIN — FENTANYL CITRATE 100 MCG: 50 INJECTION, SOLUTION INTRAMUSCULAR; INTRAVENOUS at 12:09

## 2019-12-12 NOTE — OP NOTE
OPERATIVE REPORT    PATIENT NAME: Maggie Ricardo   :  1965  MRN: [id number]  Pt Location: [facility] OR ROOM 02    SURGERY DATE: 2019    SURGEON(S) and ROLE:  Primary: Zee Goins MD  Assisting: Trav Aguero DPM; Tosha Vasquez PA-C    NOTE:  The presence of a physician assistant was necessary to help with patient positioning, surgical exposure, wound retraction, wound closure, and other key portions of the procedure  No qualified resident was available for this case  PREOPERATIVE DIAGNOSES:  Left Shoulder  Rotator Cuff Tear (supraspinatus)    POSTOPERATIVE DIAGNOSES:  Left Shoulder  Rotator Cuff Tear (supraspinatus)    PROCEDURES:  Left Shoulder Arthroscopy with:  Rotator Cuff Repair (supraspinatus)  Subacromial Decompression      ANESTHESIA TYPE:  General endotracheal and Interscalene block    ANESTHESIA STAFF:   Anesthesiologist: Ernesto Parrish DO  CRNA: Mildred Freeman CRNA    ESTIMATED BLOOD LOSS:  5 mL    PERIOPERATIVE ANTIBIOTICS:  cefazolin, 1 gram    IMPLANTS:  Arthrex Corkscrew 4 75 mm (x1), Swlvelock 4 75 mm (x2)    Implant Name Type Inv  Item Serial No   Lot No  LRB No  Used Action   ANCHOR SUT 4 75 X 14MM BCMPS CRKSCR FLLY THRD W/SUTURETAPE - [id number]  ANCHOR SUT 4 75 X 14MM BCMPS CRKSCR FLLY THRD W/SUTURETAPE  [employer] [id number] Left 1 Implanted   ANCHOR SUT 4 75 X 19 1MM W/CLD EYELET Claudeen Paras - [id number]  ANCHOR SUT 4 75 X 19 1MM W/CLD EYELET [employer] [id number] Left 1 Implanted   ANCHOR SUT 4 75 X 19 1MM W/CLD EYELET Claudeen Paras - [id number]  ANCHOR SUT 4 75 X 19 1MM W/CLD EYELET SWIVELOCK STRL  [employer] [id number] Left 1 Implanted       SPECIMENS:  * No specimens in log *      OPERATIVE INDICATIONS:  The patient is a 47 y o  male with left shoulder pain and a supraspinatus tear  Surgical treatment was indicated due to persistent symptoms despite non-surgical treatment    After a thorough discussion of the potential risks, benefits, and alternative treatments, the patient agreed to proceed with surgery  The patient understands that the risks of surgery include, but are not limited to: failure of repair, infection, neurovascular injury, wound healing complications, venous thromboembolism, persistent pain, stiffness, instability, recurrence of symptoms, potential need for additional surgeries, and loss of limb or life  Oral and written consent for surgery was obtained from the patient preoperatively  EXAM UNDER ANESTHESIA:  Operative shoulder:   FE-150   ER-60   AER-70   AIR-70    PROCEDURE AND TECHNIQUE:  On the day of surgery, the patient was identified in the preoperative holding area  The operative site was marked by the surgeon  The patient was taken into the operating room  A time-out was conducted to confirm the patient's identity, the operative site, and the proposed procedure  The patient was anesthetized, and perioperative antibiotics were administered  The patient was positioned beach chair on the OSI frame  All bony prominences were padded  The operative site was prepped and draped using standard sterile technique  A posterior portal was established, and the arthroscope was placed into the glenohumeral joint  An anterosuperior portal was established through the rotator interval   Diagnostic arthroscopy was performed  The glenohumeral joint demonstrated moderate synovitis which was debrided with a motorized shaver  The glenoid demonstrated pristine articular cartilage  The humeral head demonstrated pristine articular cartilage  The long head of the biceps tendon was intact, without inflammation or tearing  The superior labrum was intact, and demonstrated no pathology       The posterior capsulolabral complex  was intact  The anterior capsulolabral complex was intact       The subscapularis tendon was intact         The posterosuperior rotator cuff had a partial-thickness bursal side tear > 50% supraspinatus measuring 1 cm anterior to posterior  The subacromial bursa was inflamed  A thorough subacromial bursectomy was performed through a lateral portal   The rotator cuff was debrided to remove the torn and degenerative tissue  The greater tuberosity was prepared to a bleeding bone bed with a donovan  The rotator cuff was repaired with a double row suture bridge construct using one 4 75mm Corkscrew (Arthrex) and two 4 75mm Swivelock (Arthrex) placed in the greater tuberosity  The tendon was fixed securely to its footprint without undue tension, and the repair was stable with gentle shoulder rotation  There was a Type 2 acromion, and acromioplasty was performed  At the conclusion of the procedure, the instruments were withdrawn  The portals and incisions were closed with absorbable sutures and steri-strips  A sterile dressing was applied  The surgical drapes were removed  The operative arm was immobilized in a sling with abduction pillow  The patient was awakened from anesthesia and transported to the PACU in stable condition        COMPLICATIONS:  None    PATIENT DISPOSITION:  PACU       SIGNATURE:  Diogo Aleman MD  DATE:  December 12, 2019  TIME:  6:26 PM

## 2019-12-12 NOTE — ANESTHESIA POSTPROCEDURE EVALUATION
Post-Op Assessment Note    CV Status:  Stable  Pain Score: 2    Pain management: adequate     Mental Status:  Alert and awake   Hydration Status:  Euvolemic   PONV Controlled:  Controlled   Airway Patency:  Patent   Post Op Vitals Reviewed: Yes      Staff: Anesthesiologist           /62 (12/12/19 1427)    Temp     Pulse 70 (12/12/19 1427)   Resp 16 (12/12/19 1427)    SpO2 97 % (12/12/19 1427)

## 2019-12-12 NOTE — INTERVAL H&P NOTE
H&P reviewed  After examining the patient I find no changes in the patients condition since the H&P had been written      Vitals:    12/12/19 0929   BP: 158/81   Pulse: 63   Resp: 18   Temp: 98 1 °F (36 7 °C)   SpO2: 99%

## 2019-12-12 NOTE — ANESTHESIA PROCEDURE NOTES
Peripheral Block    Patient location during procedure: holding area  Start time: 12/12/2019 9:41 AM  Staffing  Anesthesiologist: Lolis Kilgore DO  Performed: anesthesiologist   Preanesthetic Checklist  Completed: patient identified, site marked, surgical consent, pre-op evaluation, timeout performed, IV checked, risks and benefits discussed and monitors and equipment checked  Peripheral Block  Patient position: supine  Prep: ChloraPrep  Patient monitoring: heart rate, frequent blood pressure checks, continuous pulse ox and cardiac monitor  Block type: femoral and sciatic  Laterality: left  Injection technique: single-shot  Procedures: ultrasound guided, Ultrasound guidance required for the procedure to increase accuracy and safety of medication placement and decrease risk of complications   and nerve stimulatorropivacaine (NAROPIN) 0 5 % perineural infiltration, 40 mL  midazolam (VERSED) 2 mg/2 mL IV, 6 mg  fentaNYL 50 mcg/mL IV, 100 mcg  Needle  Needle type: Stimuplex   Needle gauge: 21 G  Needle length: 15 cm  Needle localization: anatomical landmarks, nerve stimulator and ultrasound guidance  Assessment  Injection assessment: incremental injection and local visualized surrounding nerve on ultrasound  Paresthesia pain: none  Heart rate change: no  Slow fractionated injection: yes  Post-procedure:  site cleaned  patient tolerated the procedure well with no immediate complications

## 2019-12-12 NOTE — ANESTHESIA PREPROCEDURE EVALUATION
Review of Systems/Medical History  Patient summary reviewed        Cardiovascular  Negative cardio ROS Hyperlipidemia,    Pulmonary  Negative pulmonary ROS        GI/Hepatic  Negative GI/hepatic ROS          Negative  ROS        Endo/Other  Negative endo/other ROS History of thyroid disease , hypothyroidism,      GYN  Negative gynecology ROS          Hematology  Negative hematology ROS      Musculoskeletal  Negative musculoskeletal ROS        Neurology  Negative neurology ROS      Psychology   Negative psychology ROS              Physical Exam    Airway    Mallampati score: II  TM Distance: >3 FB  Neck ROM: full     Dental   No notable dental hx     Cardiovascular  Comment: Negative ROS, Rhythm: regular, Rate: normal, Cardiovascular exam normal    Pulmonary  Pulmonary exam normal Breath sounds clear to auscultation,     Other Findings        Anesthesia Plan  ASA Score- 2     Anesthesia Type- general and regional with ASA Monitors  Additional Monitors:   Airway Plan: ETT and LMA  Comment: ISB  NERVE BLOCK   FOR POST OP PAIN MANAGEMENT REQUESTED BY SURGEON   Plan Factors-    Induction- intravenous  Postoperative Plan-     Informed Consent- Anesthetic plan and risks discussed with patient

## 2019-12-12 NOTE — ANESTHESIA PROCEDURE NOTES
Peripheral Block    Patient location during procedure: holding area  Start time: 12/12/2019 11:52 AM  Reason for block: at surgeon's request and post-op pain management  Staffing  Anesthesiologist: Quang Frazier DO  Performed: anesthesiologist   Preanesthetic Checklist  Completed: patient identified, site marked, surgical consent, pre-op evaluation, timeout performed, IV checked, risks and benefits discussed and monitors and equipment checked  Peripheral Block  Patient position: supine  Prep: ChloraPrep  Patient monitoring: heart rate, cardiac monitor, continuous pulse ox and frequent blood pressure checks  Block type: interscalene  Laterality: left  Injection technique: single-shot  Procedures: ultrasound guided, Ultrasound guidance required for the procedure to increase accuracy and safety of medication placement and decrease risk of complications   and nerve stimulator  Ultrasound permanent image savedbupivacaine (MARCAINE) 0 5 % perineural infiltration, 10 mL  midazolam (VERSED) 2 mg/2 mL IV, 2 mg  fentaNYL 50 mcg/mL IV, 100 mcg  Needle  Needle type: Stimuplex   Needle gauge: 22 G  Needle length: 5 cm  Needle localization: nerve stimulator and ultrasound guidance  Test dose: negative  Assessment  Injection assessment: incremental injection  Paresthesia pain: none  Heart rate change: no  Slow fractionated injection: yes  Post-procedure:  site cleaned  patient tolerated the procedure well with no immediate complications  Additional Notes  experal 10ml

## 2019-12-12 NOTE — DISCHARGE INSTRUCTIONS
POSTOPERATIVE INSTRUCTIONS following SHOULDER SURGERY    MEDICATIONS:  · Resume all home medications unless otherwise instructed by your surgeon  · Pain Medication:  Oxycodone 5 mg, 1-3 tablets every 3 hours as needed  · If you were given a regional anesthetic (nerve block), please begin taking the pain medication as soon as you get home, even if you have minimal or no pain  DO NOT WAIT FOR THE NERVE BLOCK TO WEAR OFF  · Possible side effects include nausea, constipation, and urinary retention  If you experience these side effects, please call our office for assistance  · Pain med refills are authorized only during office hours (8am-4pm, Mon-Fri)  · Anti-Inflammatory:  Naproxen 500 mg, 1 tablet every 12 hours for 4 weeks  · TAKE WITH FOOD  Stop if you experience nausea, reflux, or stomach pain  · Nausea Medication:  Zofran 4 mg every 6 hours as needed  · Fill prescription ONLY if you expericnce severe nausea  WOUND CARE:  · Keep the dressing clean and dry  Light drainage may occur the first 2 days postop  · You may remove the dressings and get the incision wet in the shower 72 hours after surgery  DO NOT remove steri-strips or sutures  DO NOT immerse the incision under water  Carefully pat the incision dry  If there is wound drainage, re-apply a fresh dry gauze dressing  · Please call our office ([phone]) if you experience either of the following:  · Sudden increase in swelling, redness, or warmth at the surgical site  · Excessive incisional drainage that persists beyond the 3rd day after surgery  · Oral temperature greater than 101 degrees, not relieved with Tylenol  · Shortness of breath, chest pain, nausea, or any other concerning symptoms    SWELLING CONTROL:  · Cold Therapy: The cold therapy device may be used either continuously or only as needed, according to your preference  Do not let the pad directly touch your skin    Alternatively, apply ice (20 min on, 20 min off) as often as you feel is necessary  SLING:  · Wear your sling AT ALL TIMES (including sleep) until your first postoperative office visit  You may remove the sling for showering but must keep your arm at your side  ACTIVITY:   · DO NOT lift, carry, push, or pull anything with your operative arm  · Shoulder:  Begin small, gentle circular motions (pendulums) with the arm as tolerated  30 times clockwise and counterclockwise, 3 times per day  · Place a pillow behind the elbow while lying down  · Sleeping in a more upright position (recliner) may be more comfortable initially  · Wrist / Finger Motion:  With the sling on, move your wrist and fingers through a full range of motion 20 times per hour while awake  PHYSICAL THERAPY:  · Begin therapy 5 TO 7 DAYS AFTER SURGERY  You were given a prescription for therapy at your preoperative office visit  If you do not have physical therapy scheduled yet, please call our office for assistance  FOLLOW-UP APPOINTMENT:  · 4-5 days after surgery with:    Dr Suzi Sood, [address] [facility]  [address] [place] [address], [address] [place] [address], [address] [facility]  [phone] ([facility] [address])  [phone] (After Hours)

## 2019-12-17 ENCOUNTER — OFFICE VISIT (OUTPATIENT)
Dept: OBGYN CLINIC | Facility: MEDICAL CENTER | Age: 54
End: 2019-12-17

## 2019-12-17 VITALS
SYSTOLIC BLOOD PRESSURE: 147 MMHG | HEART RATE: 76 BPM | WEIGHT: 176 LBS | HEIGHT: 70 IN | DIASTOLIC BLOOD PRESSURE: 84 MMHG | BODY MASS INDEX: 25.2 KG/M2

## 2019-12-17 DIAGNOSIS — Z98.890 S/P ARTHROSCOPY OF LEFT SHOULDER: Primary | ICD-10-CM

## 2019-12-17 PROCEDURE — 99024 POSTOP FOLLOW-UP VISIT: CPT | Performed by: ORTHOPAEDIC SURGERY

## 2019-12-17 NOTE — PROGRESS NOTES
Orthopaedic Surgery - Office Note  Holly Beatty (62 y o  male)   : 1965   MRN: [id number]  Encounter Date: 2019    Chief Complaint   Patient presents with    Left Shoulder - Post-op       Assessment / Plan  S/P left shoulder arthroscopy with rotator cuff repair (supraspinatus) and subacromial decompression 19    · Continue sling until 4 weeks post-op   · Begin PT  · May allow running water over incision  · Continue cold therapy and pain medication as needed, transition to anti-inflammatories   Return in about 3 weeks (around 2020) for Recheck  History of Present Illness  Holly Beatty is a 47 y o  male who presents for first post-operative visit following rotator cuff repair  He reports that he is doing great and his pain is well controlled with pain medication at PM only  He rates his pain at a 2/10  He denies any numbness, tingling, fever or chills  He has PT scheduled for tomorrow  Review of Systems  Pertinent items are noted in HPI  All other systems were reviewed and are negative  Physical Exam  /84   Pulse 76   Ht 5' 10" (1 778 m)   Wt 79 8 kg (176 lb)   BMI 25 25 kg/m²   Cons: Appears well  No apparent distress  Psych: Alert  Oriented x3  Mood and affect normal   Eyes: PERRLA, EOMI  Resp: Normal effort  No audible wheezing or stridor  CV: Palpable pulse  No discernable arrhythmia  No LE edema  Lymph:  No palpable cervical, axillary, or inguinal lymphadenopathy  Skin: Warm  No palpable masses  No visible lesions  Neuro: Normal muscle tone  Normal and symmetric DTR's  Left Shoulder Exam  Alignment / Posture:  Normal shoulder posture  Inspection:  No swelling  No erythema  Incisions clean and dry  Palpation:  Surgical site tenderness  No warmth  ROM:  Shoulder ROM not tested  Full elbow ROM  Strength:  Not tested  Stability:  Not tested  Tests: No pertinent positive or negative tests    Neurovascular:  Sensation intact in Ax/R/M/U nerve distributions  2+ radial pulse  Brisk capillary refill in all fingertips  Fingers warm and perfused  Studies Reviewed  No studies to review    Procedures  No procedures today  Medical, Surgical, Family, and Social History  The patient's medical history, family history, and social history, were reviewed and updated as appropriate      Past Medical History:   Diagnosis Date    Disease of thyroid gland     Shoulder pain     left shoulder pain    Tinnitus        Past Surgical History:   Procedure Laterality Date    COLONOSCOPY      KNEE ARTHROSCOPY Left     [facility] SHLDR ARTHROSCOP,SURG,W/ROTAT CUFF REPR Left 12/12/2019    Procedure: REPAIR ROTATOR CUFF  ARTHROSCOPIC;  Surgeon: Camila Singh MD;  Location: [facility];  Service: Orthopedics    WISDOM TOOTH EXTRACTION         Family History   Problem Relation Age of Onset    Coronary artery disease Mother     Hyperlipidemia Brother     Coronary artery disease Family        Social History     Occupational History    Occupation: EMPLOYED   Tobacco Use    Smoking status: Never Smoker    Smokeless tobacco: Never Used   Substance and Sexual Activity    Alcohol use: Yes     Frequency: Monthly or less     Drinks per session: 1 or 2    Drug use: No    Sexual activity: Not on file       Allergies   Allergen Reactions    Simvastatin Myalgia         Current Outpatient Medications:     aspirin (ECOTRIN LOW STRENGTH) 81 mg EC tablet, Take 81 mg by mouth daily, Disp: , Rfl:     fenofibrate (TRIGLIDE) 160 MG tablet, TAKE 1 TABLET DAILY (Patient taking differently: Take 160 mg by mouth daily ), Disp: 90 tablet, Rfl: 4    levothyroxine 25 mcg tablet, TAKE 1 TABLET DAILY (NEED OFFICE VISIT) (Patient taking differently: Take 25 mcg by mouth daily ), Disp: 90 tablet, Rfl: 4    naproxen (NAPROSYN) 500 mg tablet, Take 1 tablet (500 mg total) by mouth 2 (two) times a day with meals, Disp: 60 tablet, Rfl: 0    ondansetron (ZOFRAN-ODT) 4 mg disintegrating tablet, Take 1 tablet (4 mg total) by mouth every 8 (eight) hours as needed for nausea or vomiting, Disp: 15 tablet, Rfl: 0    oxyCODONE (ROXICODONE) 5 mg immediate release tablet, Take 1 tablet (5 mg total) by mouth every 4 (four) hours as needed for moderate pain for up to 10 daysMax Daily Amount: 30 mg, Disp: 40 tablet, Rfl: 0      Mayur Molina MA    Scribe Attestation    I,:   Mayur Molina MA am acting as a scribe while in the presence of the attending physician :        I,:   Adelia Lee MD personally performed the services described in this documentation    as scribed in my presence :

## 2019-12-19 ENCOUNTER — EVALUATION (OUTPATIENT)
Dept: PHYSICAL THERAPY | Facility: REHABILITATION | Age: 54
End: 2019-12-19
Payer: COMMERCIAL

## 2019-12-19 DIAGNOSIS — M75.122 COMPLETE TEAR OF LEFT ROTATOR CUFF, UNSPECIFIED WHETHER TRAUMATIC: ICD-10-CM

## 2019-12-19 PROCEDURE — 97161 PT EVAL LOW COMPLEX 20 MIN: CPT | Performed by: PHYSICAL THERAPIST

## 2019-12-19 PROCEDURE — 97110 THERAPEUTIC EXERCISES: CPT | Performed by: PHYSICAL THERAPIST

## 2019-12-19 NOTE — PROGRESS NOTES
PT Evaluation     Today's date: 2019  Patient name: German Gutierrez  : 1965  MRN: [id number]  Referring provider: German Ortiz PA-C  Dx:   Encounter Diagnosis     ICD-10-CM    1  Complete tear of left rotator cuff, unspecified whether traumatic M75 122 Ambulatory referral to Physical Therapy                  Assessment  Assessment details: German Gutierrez is a 47 y o  male who presents to physical therapy s/p L RTC repair on 19  Daniel Gunn presents with pain and limitations in range of motion, strength, joint mobility, and functional ability   The current limitations are affecting Danilo's ability to function at prior level  He will benefit from skilled physical therapy to address the current impairments and functional limitations to enable him to return to daily activities at premorbid status  Thank you for the referral     Patient demonstrates good understanding and technique for provided home exercise program   Impairments: abnormal or restricted ROM, activity intolerance, impaired physical strength, lacks appropriate home exercise program and weight-bearing intolerance    Symptom irritability: lowUnderstanding of Dx/Px/POC: good   Prognosis: good    Goals  STG  Patient will decrease pain at worst to 1-2/10  Patient will demonstrate full pain free PROM  Patient will d/c sling  Patient will be independent with HEP    LTG  Patient will demonstrate full pain free AROM  Patient will demonstrate L shoulder MMT 4+/5 in all directions  Patient will be independent with ADLs and IADLs  Patient will return to running and weightlifting      Plan  Patient would benefit from: skilled physical therapy  Planned modality interventions: cryotherapy, TENS and thermotherapy: hydrocollator packs  Planned therapy interventions: manual therapy, neuromuscular re-education, patient education, therapeutic activities, therapeutic exercise, therapeutic training and home exercise program  Frequency: 2x week  Duration in weeks: 8  Treatment plan discussed with: patient        Subjective Evaluation    History of Present Illness  Date of surgery: 2019  Mechanism of injury: surgery  Mechanism of injury: Daniel Gunn is a 47 y o  Right hand dominant male presenting to physical therapy on 19 s/p Left RTC repair on 2019  He reports he surgery went well with no complications  He mentions he has not been in much pain since the surgery  He reports he was at the gym when he felt something pull, he reports he babied it for a few days and it was not getting any better, it was getting worse so he got it checked by the Dr Leeann Chambers they determined he had a RTC tear  Patient is an  for [employer] and does a lot of traveling for work  Since the surgery he mentions he is just uncomfortable, he is sleeping on the recliner, needs assistance with ADLs, IADLs, and putting on his sling  He denies any numbness/tingling into UE and no concerns with cervical ROM  He mentions he wants to get back to being at the gym and will be compliant with what he is told to do and not to do  Pain  Current pain ratin  At best pain ratin  At worst pain ratin  Quality: dull ache  Relieving factors: ice and rest    Social Support    Employment status: working  Hand dominance: right  Exercise history: cardio and weight lifting at the gym    Treatments  No previous or current treatments  Patient Goals  Patient goals for therapy: return to sport/leisure activities, increased strength, independence with ADLs/IADLs and increased motion  Patient goal: return to weightlifting and running        Objective     Observations     Additional Observation Details  Incisions: clean, dry, steristrips intact   No signs of infection    Cervical/Thoracic Screen   Cervical range of motion within normal limits    Neurological Testing     Sensation     Shoulder   Left Shoulder   Intact: light touch    Right Shoulder   Intact: light touch    Passive Range of Motion   Left Shoulder   Flexion: 90 degrees   Abduction: 75 degrees   External rotation 0°: 30 degrees     Right Shoulder   Normal passive range of motion    Strength/Myotome Testing     Additional Strength Details  Not assessed at this time               Precautions: DOS 12/12/19      Manual  12/19            L shoulder PROM                                                                     Exercise Diary  12/19            Elbow AROM 10x ea            Wrist AROM 10x ea            Ball Squeeze 3"x10            Scap Retraction 5"x10            Pendulums 30" ea                                                                                                                                                                                                                   Modalities  12/19            Gameready or CP PRN

## 2019-12-23 ENCOUNTER — OFFICE VISIT (OUTPATIENT)
Dept: PHYSICAL THERAPY | Facility: REHABILITATION | Age: 54
End: 2019-12-23
Payer: COMMERCIAL

## 2019-12-23 DIAGNOSIS — M75.122 COMPLETE TEAR OF LEFT ROTATOR CUFF, UNSPECIFIED WHETHER TRAUMATIC: Primary | ICD-10-CM

## 2019-12-23 PROCEDURE — 97110 THERAPEUTIC EXERCISES: CPT | Performed by: PHYSICAL THERAPIST

## 2019-12-23 NOTE — PROGRESS NOTES
Daily Note     Today's date: 2019  Patient name: Minor Peters  : 1965  MRN: [id number]  Referring provider: Kimberly Lozano PA-C  Dx:   Encounter Diagnosis     ICD-10-CM    1  Complete tear of left rotator cuff, unspecified whether traumatic M75 122        Start Time: 735  Stop Time: 0800  Total time in clinic (min): 25 minutes    Subjective: Patient presents for first visit post IE  He continues to report minimal symptoms  He reports compliance with HEP and sling  Objective: See treatment diary below      Assessment: Tolerated treatment well  Patient exhibited good technique with therapeutic exercises and would benefit from continued PT  Patient presents with PROM within limits of protocol to 90 deg flexion and 30 deg ER at 45 deg abduction  Provided edu about POC and protocol  Continue to follow and progress per protocol  Plan: Progress treament per protocol        Precautions: DOS 19      Manual             L shoulder PROM  15 min                                                                   Exercise Diary             Elbow AROM 10x ea 10x           Wrist AROM 10x ea HEP           Ball Squeeze 3"x10 HEP           Scap Retraction 5"x10 5" 10x           Pendulums 30" ea 30x each                                                                                                                                                                                                                  Modalities             Gameready or CP PRN  declined

## 2019-12-27 ENCOUNTER — OFFICE VISIT (OUTPATIENT)
Dept: PHYSICAL THERAPY | Facility: REHABILITATION | Age: 54
End: 2019-12-27
Payer: COMMERCIAL

## 2019-12-27 DIAGNOSIS — M75.122 COMPLETE TEAR OF LEFT ROTATOR CUFF, UNSPECIFIED WHETHER TRAUMATIC: Primary | ICD-10-CM

## 2019-12-27 PROCEDURE — 97110 THERAPEUTIC EXERCISES: CPT

## 2019-12-27 NOTE — PROGRESS NOTES
Daily Note     Today's date: 2019  Patient name: Shaq Marc  : 1965  MRN: [id number]  Referring provider: Yesenia Killian PA-C  Dx:   Encounter Diagnosis     ICD-10-CM    1  Complete tear of left rotator cuff, unspecified whether traumatic M75 122        Start Time: 0730  Stop Time: 0810  Total time in clinic (min): 40 minutes    Subjective: Patient reports compliance with sling use and HEP, notes overall pain levels are minimal but sleeping has been hard  Pt notes getting about 2-3 hours of sleep at a time before waking, is no longer taking pain medication as he describes his sx's as "tolerable"      Objective: See treatment diary below    Precautions: DOS 19      Manual            L shoulder PROM  15 min 15 min                                                                  Exercise Diary            Elbow AROM 10x ea 10x x15          Wrist AROM 10x ea HEP 5"x15 ea          Ball squeeze 3"x10 HEP Red 5"x10 ea          Scap Retraction 5"x10 5" 10x 5"x20          Pendulums 30" ea 30x each x30 ea                                                                                                                                                                                                                 Modalities            Gameready or CP PRN  declined no                                           Assessment: Tolerated treatment well  Pt demonstrates good knowledge of exercise program, however does require cues throughout for hold time and to facilitate correct technique, especially during scap retraction to perform without shoulder elevation  Pt achieves PROM goals as allowed per protocol  Provided education on protocol and restrictions which patient verbalizes understanding of  Pt deferred ice to do at home  Patient exhibited good technique with therapeutic exercises and would benefit from continued PT          Plan: Progress treament per protocol

## 2019-12-30 ENCOUNTER — OFFICE VISIT (OUTPATIENT)
Dept: PHYSICAL THERAPY | Facility: REHABILITATION | Age: 54
End: 2019-12-30
Payer: COMMERCIAL

## 2019-12-30 DIAGNOSIS — M75.122 COMPLETE TEAR OF LEFT ROTATOR CUFF, UNSPECIFIED WHETHER TRAUMATIC: Primary | ICD-10-CM

## 2019-12-30 PROCEDURE — 97110 THERAPEUTIC EXERCISES: CPT

## 2019-12-30 NOTE — PROGRESS NOTES
Daily Note     Today's date: 2019  Patient name: Angela Garcia  : 1965  MRN: [id number]  Referring provider: Barb Coronel PA-C  Dx:   Encounter Diagnosis     ICD-10-CM    1  Complete tear of left rotator cuff, unspecified whether traumatic M75 122        Start Time: 730  Stop Time: 0810  Total time in clinic (min): 40 minutes    Subjective: Patient reports mild soreness after last session which he notes diminished after icing  Pt notes he is pain free during the day, but is having difficulty sleeping at night and continues to be limited to about 3-4 hours of sleep before waking  Pt denies use of pain medication, reports compliance with HEP and sling use  Objective: See treatment diary below    Precautions: DOS 19      Manual           L shoulder PROM  15 min 15 min 15 min                                                                 Exercise Diary           Elbow AROM 10x ea 10x x15 x20         Wrist AROM 10x ea HEP 5"x15 ea 5"x15 ea         Ball squeeze 3"x10 HEP Red 5"x10 ea Red 5"x10 ea         Scap Retraction 5"x10 5" 10x 5"x20 10" x10         Pendulums 30" ea 30x each x30 ea x30 ea                                                                                                                                                                                                                Modalities           Gameready or CP PRN  declined np np                                          Assessment: Tolerated treatment well  Progressed scap retraction to longer hold time with increased challenge, also challenged with  strength with red digiflex  Pt achieves PROM goals in allowable motions per protocol with mild soft tissue end feel  Pt deferred ice to perform at home  Patient exhibited good technique with therapeutic exercises and would benefit from continued PT          Plan: Progress treament per protocol

## 2020-01-03 ENCOUNTER — OFFICE VISIT (OUTPATIENT)
Dept: PHYSICAL THERAPY | Facility: REHABILITATION | Age: 55
End: 2020-01-03
Payer: COMMERCIAL

## 2020-01-03 ENCOUNTER — TELEPHONE (OUTPATIENT)
Dept: OBGYN CLINIC | Facility: MEDICAL CENTER | Age: 55
End: 2020-01-03

## 2020-01-03 DIAGNOSIS — Z98.890 S/P ARTHROSCOPY OF LEFT SHOULDER: Primary | ICD-10-CM

## 2020-01-03 DIAGNOSIS — M75.122 COMPLETE TEAR OF LEFT ROTATOR CUFF, UNSPECIFIED WHETHER TRAUMATIC: Primary | ICD-10-CM

## 2020-01-03 PROCEDURE — 97110 THERAPEUTIC EXERCISES: CPT | Performed by: PHYSICAL THERAPIST

## 2020-01-03 RX ORDER — OXYCODONE HYDROCHLORIDE 5 MG/1
5 TABLET ORAL EVERY 4 HOURS PRN
Qty: 30 TABLET | Refills: 0 | Status: SHIPPED | OUTPATIENT
Start: 2020-01-03 | End: 2020-01-13

## 2020-01-03 NOTE — TELEPHONE ENCOUNTER
I did discuss with the patient completely weaning off of the oxycodone over the course of this prescription  At this time he is just taking at nighttime mainly following physical therapy sessions when he has a lot of pain  He has continued with anti-inflammatories regularly and ice frequently  We will see him in the office again next week  Oxycodone 5 mg was sent to the pharmacy on file

## 2020-01-03 NOTE — PROGRESS NOTES
Daily Note     Today's date: 1/3/2020  Patient name: Sanjuana Cooper  : 1965  MRN: [id number]  Referring provider: Lisa Bautista PA-C  Dx:   Encounter Diagnosis     ICD-10-CM    1  Complete tear of left rotator cuff, unspecified whether traumatic M75 122        Start Time: 0730  Stop Time: 0800  Total time in clinic (min): 30 minutes    Subjective: Patient reports he is doing very well  He feels like he turned a corner  He has minimal lateral shoulder discomfort in the lateral aspect of his shoulder, but otherwise has no discomfort  Objective: See treatment diary below    Precautions: DOS 19      Manual  12/19 12/23 12/27 12/30 1/3        L shoulder PROM  15 min 15 min 15 min 20 min                                                                Exercise Diary  12/19 12/23 12/27 12/30 1/3        Elbow AROM 10x ea 10x x15 x20 20x        Wrist AROM 10x ea HEP 5"x15 ea 5"x15 ea np        Ball squeeze 3"x10 HEP Red 5"x10 ea Red 5"x10 ea np        Scap Retraction 5"x10 5" 10x 5"x20 10" x10 10" 10x        Pendulums 30" ea 30x each x30 ea x30 ea 30x ea                                                                                                                                                                                                               Modalities  12/19 12/23 12/27 12/30 1/3        Gameready or CP PRN  declined np np np                                         Assessment: Tolerated treatment well  Patient is able to achieve 125 degrees of motion into scaption and ER to 45 degrees at 45 deg abduction this visit  He continues to progress well per protocol  Patient exhibited good technique with therapeutic exercises and would benefit from continued PT  Continue to progress per protocol  Plan: Progress treament per protocol

## 2020-01-07 ENCOUNTER — OFFICE VISIT (OUTPATIENT)
Dept: PHYSICAL THERAPY | Facility: REHABILITATION | Age: 55
End: 2020-01-07
Payer: COMMERCIAL

## 2020-01-07 DIAGNOSIS — M75.122 COMPLETE TEAR OF LEFT ROTATOR CUFF, UNSPECIFIED WHETHER TRAUMATIC: Primary | ICD-10-CM

## 2020-01-07 PROCEDURE — 97110 THERAPEUTIC EXERCISES: CPT

## 2020-01-07 NOTE — PROGRESS NOTES
Daily Note     Today's date: 2020  Patient name: Maggie Ricardo  : 1965  MRN: [id number]  Referring provider: Loretta Hart PA-C  Dx:   Encounter Diagnosis     ICD-10-CM    1  Complete tear of left rotator cuff, unspecified whether traumatic M75 122        Start Time: 728  Stop Time: 0800  Total time in clinic (min): 32 minutes    Subjective: Patient reports mild improvement in sleeping but continues to c/o discomfort throughout the night  Mild soreness into anterior and lateral shoulder at times but overall notes minimal sx's and no pain  Objective: See treatment diary below    Precautions: DOS 19      Manual  12/19 12/23 12/27 12/30 1/3 1/7       L shoulder PROM  15 min 15 min 15 min 20 min 15 min                                                               Exercise Diary  12/19 12/23 12/27 12/30 1/3 1/7       Elbow AROM 10x ea 10x x15 x20 20x x30       Wrist AROM 10x ea HEP 5"x15 ea 5"x15 ea np 5"x20 ea       Ball squeeze 3"x10 HEP Red 5"x10 ea Red 5"x10 ea np Red 5"x15 ea       Scap Retraction 5"x10 5" 10x 5"x20 10" x10 10" 10x 10" x10       Pendulums 30" ea 30x each x30 ea x30 ea 30x ea x30 ea                                                                                                                                                                                                              Modalities  12/19 12/23 12/27 12/30 1/3 1/7       Gameready or CP PRN  declined np np np np                                        Assessment: Tolerated treatment well  Pt demonstrates good knowledge of TE program with minimal cues for form, continues to progress well per protocol at this time  Pt achieves allowable 125 degrees of forward elevation and 55 degrees of ER at 45 degrees post manual therapy  Pt has f/u with surgeon on Friday  Patient exhibited good technique with therapeutic exercises and would benefit from continued PT  Plan: Progress treament per protocol

## 2020-01-10 ENCOUNTER — APPOINTMENT (OUTPATIENT)
Dept: PHYSICAL THERAPY | Facility: REHABILITATION | Age: 55
End: 2020-01-10
Payer: COMMERCIAL

## 2020-01-10 ENCOUNTER — OFFICE VISIT (OUTPATIENT)
Dept: OBGYN CLINIC | Facility: MEDICAL CENTER | Age: 55
End: 2020-01-10

## 2020-01-10 VITALS
WEIGHT: 180 LBS | HEART RATE: 77 BPM | SYSTOLIC BLOOD PRESSURE: 149 MMHG | BODY MASS INDEX: 25.77 KG/M2 | DIASTOLIC BLOOD PRESSURE: 94 MMHG | HEIGHT: 70 IN

## 2020-01-10 DIAGNOSIS — Z98.890 S/P ARTHROSCOPY OF LEFT SHOULDER: Primary | ICD-10-CM

## 2020-01-10 PROCEDURE — 99024 POSTOP FOLLOW-UP VISIT: CPT | Performed by: ORTHOPAEDIC SURGERY

## 2020-01-10 NOTE — PROGRESS NOTES
Orthopaedic Surgery - Office Note  Drew Joyner (56 y o  male)   : 1965   MRN: [id number]  Encounter Date: 1/10/2020    Chief Complaint   Patient presents with    Left Shoulder - Follow-up       Assessment / Plan  Status post left shoulder arthroscopy with rotator cuff repair and subacromial decompression on 2019    · Discontinue sling  · Continue physical therapy  · Avoid active overhead and external rotation activities  Return in about 4 weeks (around 2020)  History of Present Illness  Drew Joyner is a 47 y o  male who presents to the office status post left shoulder arthroscopy with rotator cuff repair and subacromial decompression on 2019  He has been attending physical therapy  He notes that he has not been completely compliant with his sling  He has been experiencing increased pain at night  He denies any numbness or tingling  Review of Systems  Pertinent items are noted in HPI  All other systems were reviewed and are negative  Physical Exam  /94   Pulse 77   Ht 5' 10" (1 778 m)   Wt 81 6 kg (180 lb)   BMI 25 83 kg/m²   Cons: Appears well  No apparent distress  Psych: Alert  Oriented x3  Mood and affect normal   Eyes: PERRLA, EOMI  Resp: Normal effort  No audible wheezing or stridor  CV: Palpable pulse  No discernable arrhythmia  No LE edema  Lymph:  No palpable cervical, axillary, or inguinal lymphadenopathy  Skin: Warm  No palpable masses  No visible lesions  Neuro: Normal muscle tone  Normal and symmetric DTR's  Left Shoulder Exam  Alignment / Posture:  Normal shoulder posture  Inspection:  No erythema  No ecchymosis  Incisions healed  Palpation:  No tenderness  ROM:  Shoulder  degrees  Shoulder ER 30 degrees  Strength:  Able to actively flex & extend elbow  Stability:  No objective shoulder instability  Tests: No pertinent positive or negative tests  Neurovascular:  Sensation intact in Ax/R/M/U nerve distributions  Palpable radial pulse  Studies Reviewed  No studies to review    Procedures  No procedures today  Medical, Surgical, Family, and Social History  The patient's medical history, family history, and social history, were reviewed and updated as appropriate      Past Medical History:   Diagnosis Date    Disease of thyroid gland     Shoulder pain     left shoulder pain    Tinnitus        Past Surgical History:   Procedure Laterality Date    COLONOSCOPY      KNEE ARTHROSCOPY Left     DC SHLDR ARTHROSCOP,SURG,W/ROTAT CUFF REPR Left 12/12/2019    Procedure: REPAIR ROTATOR CUFF  ARTHROSCOPIC;  Surgeon: Addison Leyva MD;  Location: [facility];  Service: Orthopedics    WISDOM TOOTH EXTRACTION         Family History   Problem Relation Age of Onset    Coronary artery disease Mother     Hyperlipidemia Brother     Coronary artery disease Family        Social History     Occupational History    Occupation: EMPLOYED   Tobacco Use    Smoking status: Never Smoker    Smokeless tobacco: Never Used   Substance and Sexual Activity    Alcohol use: Yes     Frequency: Monthly or less     Drinks per session: 1 or 2    Drug use: No    Sexual activity: Not on file       Allergies   Allergen Reactions    Simvastatin Myalgia         Current Outpatient Medications:     aspirin (ECOTRIN LOW STRENGTH) 81 mg EC tablet, Take 81 mg by mouth daily, Disp: , Rfl:     fenofibrate (TRIGLIDE) 160 MG tablet, TAKE 1 TABLET DAILY (Patient taking differently: Take 160 mg by mouth daily ), Disp: 90 tablet, Rfl: 4    levothyroxine 25 mcg tablet, TAKE 1 TABLET DAILY (NEED OFFICE VISIT) (Patient taking differently: Take 25 mcg by mouth daily ), Disp: 90 tablet, Rfl: 4    naproxen (NAPROSYN) 500 mg tablet, Take 1 tablet (500 mg total) by mouth 2 (two) times a day with meals, Disp: 60 tablet, Rfl: 0    oxyCODONE (ROXICODONE) 5 mg immediate release tablet, Take 1 tablet (5 mg total) by mouth every 4 (four) hours as needed for moderate pain for up to 10 daysMax Daily Amount: 30 mg, Disp: 30 tablet, Rfl: 0    ondansetron (ZOFRAN-ODT) 4 mg disintegrating tablet, Take 1 tablet (4 mg total) by mouth every 8 (eight) hours as needed for nausea or vomiting (Patient not taking: Reported on 1/10/2020), Disp: 15 tablet, Rfl: 0      Nidia Diaz    I,:   Peggy Andres am acting as a scribe while in the presence of the attending physician :        I,:   Jhoana Fink MD personally performed the services described in this documentation    as scribed in my presence :

## 2020-01-13 ENCOUNTER — OFFICE VISIT (OUTPATIENT)
Dept: PHYSICAL THERAPY | Facility: REHABILITATION | Age: 55
End: 2020-01-13
Payer: COMMERCIAL

## 2020-01-13 DIAGNOSIS — M75.122 COMPLETE TEAR OF LEFT ROTATOR CUFF, UNSPECIFIED WHETHER TRAUMATIC: Primary | ICD-10-CM

## 2020-01-13 PROCEDURE — 97110 THERAPEUTIC EXERCISES: CPT

## 2020-01-13 NOTE — PROGRESS NOTES
Daily Note     Today's date: 2020  Patient name: Asael Simental  : 1965  MRN: [id number]  Referring provider: Mariposa Canales PA-C  Dx:   Encounter Diagnosis     ICD-10-CM    1  Complete tear of left rotator cuff, unspecified whether traumatic M75 122        Start Time: 09  Stop Time: 1000  Total time in clinic (min): 30 minutes    Subjective: Patient reports having a f/u with surgeon since last visit, notes surgeon was pleased with progress thus far and is able to discharge sling and begin active motion below shoulder height  Pt reports awareness of restrictions of nothing heavier than coffee cup  Objective: See treatment diary below    Precautions: DOS 19      Manual  12/19 12/23 12/27 12/30 1/3 1/7 1/13      L shoulder PROM  15 min 15 min 15 min 20 min 15 min 15 min                                                              Exercise Diary  12/19 12/23 12/27 12/30 1/3 1/7 1/13      Elbow AROM 10x ea 10x x15 x20 20x x30 np      Wrist AROM 10x ea HEP 5"x15 ea 5"x15 ea np 5"x20 ea np      Ball squeeze 3"x10 HEP Red 5"x10 ea Red 5"x10 ea np Red 5"x15 ea np      Scap Retraction 5"x10 5" 10x 5"x20 10" x10 10" 10x 10" x10 10"x10      Pendulums 30" ea 30x each x30 ea x30 ea 30x ea x30 ea x30 ea      Supine wand flexion       5"x10      Supine serratus punches       5"x10      Prone shoulder ext to hip       5"x10      Table slide flex       5"x10                                                                                                                                                         Modalities  12/19 12/23 12/27 12/30 1/3 1/7 1/13      Gameready or CP PRN  declined np np np np np                                       Assessment: Tolerated treatment well  Patient has reached phase 2 of protocol and was progressed appropriately  Pt demonstrates good knowledge of progressions and was educated to remain pain free with all activities    Pt will be away for work and was provided updated home program including new activities to continue progressing while away, verbalizes understanding  Pt was educated in possible soreness with progressions and utilizing ice as needed  Patient exhibited good technique with therapeutic exercises and would benefit from continued PT  Plan: Progress treament per protocol

## 2020-01-17 ENCOUNTER — APPOINTMENT (OUTPATIENT)
Dept: PHYSICAL THERAPY | Facility: REHABILITATION | Age: 55
End: 2020-01-17
Payer: COMMERCIAL

## 2020-01-21 ENCOUNTER — OFFICE VISIT (OUTPATIENT)
Dept: PHYSICAL THERAPY | Facility: REHABILITATION | Age: 55
End: 2020-01-21
Payer: COMMERCIAL

## 2020-01-21 DIAGNOSIS — M75.122 COMPLETE TEAR OF LEFT ROTATOR CUFF, UNSPECIFIED WHETHER TRAUMATIC: Primary | ICD-10-CM

## 2020-01-21 PROCEDURE — 97110 THERAPEUTIC EXERCISES: CPT

## 2020-01-21 NOTE — PROGRESS NOTES
Daily Note     Today's date: 2020  Patient name: Jennifer Jackson  : 1965  MRN: [id number]  Referring provider: Jackie Parada PA-C  Dx:   Encounter Diagnosis     ICD-10-CM    1  Complete tear of left rotator cuff, unspecified whether traumatic M75 122        Start Time: 6072  Stop Time: 0810  Total time in clinic (min): 35 minutes    Subjective: Patient reports compliance with HEP while away for work, notes mild soreness after last treatment but has had no c/o pain  Pt c/o L shoulder "tightness" arriving to PT this AM       Objective: See treatment diary below    Precautions: DOS 19      Manual  12/19 12/23 12/27 12/30 1/3 1/7 1/13 1/21     L shoulder PROM  15 min 15 min 15 min 20 min 15 min 15 min 15 min                                                             Exercise Diary  12/19 12/23 12/27 12/30 1/3 1/7 1/13 1/21     Elbow AROM 10x ea 10x x15 x20 20x x30 np 1# x20     Wrist AROM 10x ea HEP 5"x15 ea 5"x15 ea np 5"x20 ea np np     Ball squeeze 3"x10 HEP Red 5"x10 ea Red 5"x10 ea np Red 5"x15 ea np np     Scap Retraction 5"x10 5" 10x 5"x20 10" x10 10" 10x 10" x10 10"x10 10"x10     Pendulums 30" ea 30x each x30 ea x30 ea 30x ea x30 ea x30 ea x30 ea     Supine wand flexion       5"x10 5"x10     Supine serratus punches       5"x10 5"x15     Prone shoulder ext to hip       5"x10 5"x15     Table slide flex       5"x10 5"x10                                                                                                                                                        Modalities  12/19 12/23 12/27 12/30 1/3 1/7 1/13 1/21     Gameready or CP PRN  declined np np np np np np                                      Assessment: Tolerated treatment well  Pt demonstrates good scapular motion in prone and improved AAROM with wand, performs all TE pain free  Pt achieves ROM goals with no end range pain as allowed per protocol at this time     Patient exhibited good technique with therapeutic exercises and would benefit from continued PT  Plan: Progress treament per protocol  Plan to RE next visit

## 2020-02-03 ENCOUNTER — EVALUATION (OUTPATIENT)
Dept: PHYSICAL THERAPY | Facility: REHABILITATION | Age: 55
End: 2020-02-03
Payer: COMMERCIAL

## 2020-02-03 DIAGNOSIS — M75.122 COMPLETE TEAR OF LEFT ROTATOR CUFF, UNSPECIFIED WHETHER TRAUMATIC: Primary | ICD-10-CM

## 2020-02-03 PROCEDURE — 97112 NEUROMUSCULAR REEDUCATION: CPT | Performed by: PHYSICAL THERAPIST

## 2020-02-03 PROCEDURE — 97110 THERAPEUTIC EXERCISES: CPT | Performed by: PHYSICAL THERAPIST

## 2020-02-03 PROCEDURE — 97140 MANUAL THERAPY 1/> REGIONS: CPT | Performed by: PHYSICAL THERAPIST

## 2020-02-03 NOTE — PROGRESS NOTES
PT Re-Evaluation     Today's date: 2/3/2020  Patient name: Nelson Saucedo  : 1965  MRN: [id number]  Referring provider: Coleen Whitman PA-C  Dx:   Encounter Diagnosis     ICD-10-CM    1  Complete tear of left rotator cuff, unspecified whether traumatic M75 122        Start Time: 7477  Stop Time: 0815  Total time in clinic (min): 37 minutes    Assessment  Assessment details: Nelson Saucedo is a 47 y o  male who presents to physical therapy s/p L RTC repair on 19  Patient's FOTO improved by 39 points to 58/100  Patient reports improvement with overall pain, ROM,and  ADLS  Patient reports continued difficulty with lifting, ROM, strength, pain, and recreational activities  Patient has made good progress towards goals established for physical therapy  Patient would benefit from continued skilled physical therapy services for continued shoulder ROM and strengthening to maximize function  Impairments: abnormal or restricted ROM, activity intolerance, impaired physical strength and poor posture     Symptom irritability: lowUnderstanding of Dx/Px/POC: good   Prognosis: good    Goals  STG  Patient will decrease pain at worst to 1-2/10-MET  Patient will demonstrate full pain free PROM-PARTIALLY MET  Patient will d/c sling-MET  Patient will be independent with HEP-MET    LTG  Patient will demonstrate full pain free AROM- PARTIALLY MET  Patient will demonstrate L shoulder MMT 4+/5 in all directions-NOT ASSESSED  Patient will be independent with ADLs and IADLs-MET  Patient will return to running and weightlifting  -NOT ATTEMPTED    Plan  Plan details: Patient will be a RE in 6 weeks      Patient would benefit from: skilled physical therapy  Planned modality interventions: cryotherapy  Planned therapy interventions: manual therapy, neuromuscular re-education, patient education, therapeutic activities, therapeutic exercise, therapeutic training and home exercise program  Frequency: 1x week  Duration in visits: 6  Duration in weeks: 6  Treatment plan discussed with: patient        Subjective Evaluation    History of Present Illness  Date of surgery: 2019  Mechanism of injury: surgery  Mechanism of injury: Noble Gunter is a 47 y o  Right hand dominant male presenting to physical therapy on 19 s/p Left RTC repair on 2019  He reports he surgery went well with no complications  He mentions he has not been in much pain since the surgery  He reports he was at the gym when he felt something pull, he reports he babied it for a few days and it was not getting any better, it was getting worse so he got it checked by the Dr Jae Nava they determined he had a RTC tear  Patient is an  for [employer] and does a lot of traveling for work  Since the surgery he mentions he is just uncomfortable, he is sleeping on the recliner, needs assistance with ADLs, IADLs, and putting on his sling  He denies any numbness/tingling into UE and no concerns with cervical ROM  He mentions he wants to get back to being at the gym and will be compliant with what he is told to do and not to do  Pain  Current pain ratin  At best pain ratin  At worst pain ratin  Quality: dull ache  Relieving factors: ice and rest  Progression: improved    Social Support    Employment status: working  Hand dominance: right  Exercise history: cardio and weight lifting at the gym    Treatments  No previous or current treatments  Patient Goals  Patient goals for therapy: return to sport/leisure activities, increased strength, independence with ADLs/IADLs and increased motion  Patient goal: return to weightlifting and running        Objective     Static Posture     Head  Forward  Shoulders  Rounded      Cervical/Thoracic Screen   Cervical range of motion within normal limits    Active Range of Motion   Left Shoulder   Flexion: 113 degrees   Abduction: 110 degrees     Left Elbow   Normal active range of motion    Right Elbow Normal active range of motion    Passive Range of Motion   Left Shoulder   Flexion: 135 degrees   Abduction: 122 degrees   External rotation 90°: 73 degrees   Internal rotation 90°: 32 degrees     Right Shoulder   Normal passive range of motion    Strength/Myotome Testing     Left Shoulder     Planes of Motion   Flexion: 3-   Abduction: 3-     Additional Strength Details  Not assessed at this time               Precautions: DOS 12/12/19        Manual  12/19 12/23 12/27 12/30 1/3 1/7 1/13 1/21 2/3     L shoulder PROM   15 min 15 min 15 min 20 min 15 min 15 min 15 min  10 min      measurements                 10 min     Rhythmic stabalization                  NV                                                           Exercise Diary  12/19 12/23 12/27 12/30 1/3 1/7 1/13 1/21  2/3     Elbow AROM 10x ea 10x x15 x20 20x x30 np 1# x20  np     Wall slide flexion         5" 10x L     sidelying ER AROM         5" 15x L     Scap Retraction 5"x10 5" 10x 5"x20 10" x10 10" 10x 10" x10 10"x10 10"x10  np     Active scaption          10x L     Supine wand flexion             5"x10 5"x10  np     Supine serratus punches             5"x10 5"x15  5" 15x     Prone shoulder ext to hip             5"x10 5"x15  np     Table slide flex             5"x10 5"x10  np      posterior capsule stretch                  NV      submax isometrics (no add)                  NV                                                                                                                                                                                                                                   Modalities

## 2020-02-14 ENCOUNTER — OFFICE VISIT (OUTPATIENT)
Dept: OBGYN CLINIC | Facility: MEDICAL CENTER | Age: 55
End: 2020-02-14

## 2020-02-14 ENCOUNTER — OFFICE VISIT (OUTPATIENT)
Dept: PHYSICAL THERAPY | Facility: REHABILITATION | Age: 55
End: 2020-02-14
Payer: COMMERCIAL

## 2020-02-14 VITALS
HEART RATE: 84 BPM | BODY MASS INDEX: 25.77 KG/M2 | HEIGHT: 70 IN | WEIGHT: 180 LBS | SYSTOLIC BLOOD PRESSURE: 148 MMHG | DIASTOLIC BLOOD PRESSURE: 70 MMHG

## 2020-02-14 DIAGNOSIS — M75.122 COMPLETE TEAR OF LEFT ROTATOR CUFF, UNSPECIFIED WHETHER TRAUMATIC: Primary | ICD-10-CM

## 2020-02-14 DIAGNOSIS — Z98.890 S/P ARTHROSCOPY OF LEFT SHOULDER: ICD-10-CM

## 2020-02-14 PROCEDURE — 97112 NEUROMUSCULAR REEDUCATION: CPT | Performed by: PHYSICAL THERAPIST

## 2020-02-14 PROCEDURE — 99024 POSTOP FOLLOW-UP VISIT: CPT | Performed by: ORTHOPAEDIC SURGERY

## 2020-02-14 PROCEDURE — 3008F BODY MASS INDEX DOCD: CPT | Performed by: ORTHOPAEDIC SURGERY

## 2020-02-14 PROCEDURE — 97110 THERAPEUTIC EXERCISES: CPT | Performed by: PHYSICAL THERAPIST

## 2020-02-14 NOTE — PROGRESS NOTES
Daily Note     Today's date: 2020  Patient name: Eloy Rodriguez  : 1965  MRN: [id number]  Referring provider: Giovanna Hollins PA-C  Dx:   Encounter Diagnosis     ICD-10-CM    1  Complete tear of left rotator cuff, unspecified whether traumatic M75 122        Start Time: 732  Stop Time: 08  Total time in clinic (min): 48 minutes    Subjective: Patient reports that he is doing well this morning  He has had minimal pain over the last week and feels good  He follows up with surgeon today  Objective: See treatment diary below      Assessment: Tolerated treatment well  Patient exhibited good technique with therapeutic exercises and would benefit from continued PT  Demonstrates good form and strength with there performed this visit  Not challenged by 2 lbs added to scaption to 90 deg  Patient demonstrates good control with perturbations  Plan: Progress treament per protocol        Precautions: DOS 19        Manual   1/3 1/7  2/3  2/14   L shoulder PROM   15 min 15 min 15 min 20 min 15 min 15 min 15 min  10 min  15 min    measurements                 10 min     Rhythmic stabalization 90 deg                  NV 3x 30"                                                         Exercise Diary   1/3 1/7   2/3  2/14   Elbow AROM 10x ea 10x x15 x20 20x x30 np 1# x20  np  np   Wall slide flexion         5" 10x L  d/c   sidelying ER AROM         5" 15x L  20x L   Scap Retraction 5"x10 5" 10x 5"x20 10" x10 10" 10x 10" x10 10"x10 10"x10  np  np   Active scaption to 90 deg         10x L  2 lbs 20x   Supine wand flexion             5"x10 5"x10  np  20x   Supine serratus punches             5"x10 5"x15  5" 15x  5" 20x   Prone shoulder ext to hip             5"x10 5"x15  np  np   Table slide flex             5"x10 5"x10  np  d/c    posterior capsule stretch                  NV  3x15"    submax isometrics (no add)                  NV  5" 5x each    pulleys                    3 min                                                                                                                                                                                                         Modalities

## 2020-02-14 NOTE — PROGRESS NOTES
Orthopaedic Surgery - Office Note  Sandro Fink (38 y o  male)   : 1965   MRN: [id number]  Encounter Date: 2020    Chief Complaint   Patient presents with    Left Shoulder - Follow-up       Assessment / Plan  Status post left shoulder arthroscopy with rotator cuff repair and subacromial decompression with appropriate progression  · At this point the patient may return to running however I would only like him to participate in lower extremity strengthening at this time  · Home exercise program reviewed  · Continue outpatient PT  Return in about 4 weeks (around 3/13/2020)  History of Present Illness  Sandro Fink is a 47 y o  male who presents for follow-up evaluation, he is 9 weeks status post left shoulder arthroscopy with rotator cuff repair and subacromial decompression performed on 2019  Patient has been compliant with formal physical therapy 2 times a week with a daily home exercise program   He reports he is experiencing no pain  Patient is interested in getting back into running and lifting at this point  He denies any further injury or trauma to his left shoulder  He denies any distal paresthesias  Review of Systems  Pertinent items are noted in HPI  All other systems were reviewed and are negative  Physical Exam  /70   Pulse 84   Ht 5' 10" (1 778 m)   Wt 81 6 kg (180 lb)   BMI 25 83 kg/m²   Cons: Appears well  No apparent distress  Psych: Alert  Oriented x3  Mood and affect normal   Eyes: PERRLA, EOMI  Resp: Normal effort  No audible wheezing or stridor  CV: Palpable pulse  No discernable arrhythmia  No LE edema  Lymph:  No palpable cervical, axillary, or inguinal lymphadenopathy  Skin: Warm  No palpable masses  No visible lesions  Neuro: Normal muscle tone  Normal and symmetric DTR's  Left Shoulder Exam  Alignment / Posture:  Normal shoulder posture  Inspection:  No swelling  Incisions healed    Palpation:  No tenderness  ROM:  Shoulder   Shoulder ER 50  Shoulder IR L1  Strength:  FE 4-/5 ER 4/5  Stability:  No objective shoulder instability  Tests: (-) Belly press  Neurovascular:  Sensation intact in Ax/R/M/U nerve distributions  2+ radial pulse  Studies Reviewed  No studies to review    Procedures  No procedures today  Medical, Surgical, Family, and Social History  The patient's medical history, family history, and social history, were reviewed and updated as appropriate      Past Medical History:   Diagnosis Date    Disease of thyroid gland     Shoulder pain     left shoulder pain    Tinnitus        Past Surgical History:   Procedure Laterality Date    COLONOSCOPY      KNEE ARTHROSCOPY Left     NE SHLDR ARTHROSCOP,SURG,W/ROTAT CUFF REPR Left 12/12/2019    Procedure: REPAIR ROTATOR CUFF  ARTHROSCOPIC;  Surgeon: Emir Yadav MD;  Location: [facility];  Service: Orthopedics    WISDOM TOOTH EXTRACTION         Family History   Problem Relation Age of Onset    Coronary artery disease Mother     Hyperlipidemia Brother     Coronary artery disease Family        Social History     Occupational History    Occupation: EMPLOYED   Tobacco Use    Smoking status: Never Smoker    Smokeless tobacco: Never Used   Substance and Sexual Activity    Alcohol use: Yes     Frequency: Monthly or less     Drinks per session: 1 or 2    Drug use: No    Sexual activity: Not on file       Allergies   Allergen Reactions    Simvastatin Myalgia         Current Outpatient Medications:     aspirin (ECOTRIN LOW STRENGTH) 81 mg EC tablet, Take 81 mg by mouth daily, Disp: , Rfl:     fenofibrate (TRIGLIDE) 160 MG tablet, TAKE 1 TABLET DAILY (Patient taking differently: Take 160 mg by mouth daily ), Disp: 90 tablet, Rfl: 4    levothyroxine 25 mcg tablet, TAKE 1 TABLET DAILY (NEED OFFICE VISIT) (Patient taking differently: Take 25 mcg by mouth daily ), Disp: 90 tablet, Rfl: 4    naproxen (NAPROSYN) 500 mg tablet, Take 1 tablet (500 mg total) by mouth 2 (two) times a day with meals, Disp: 60 tablet, Rfl: 0    ondansetron (ZOFRAN-ODT) 4 mg disintegrating tablet, Take 1 tablet (4 mg total) by mouth every 8 (eight) hours as needed for nausea or vomiting, Disp: 15 tablet, Rfl: 0      Nicole Palangio    Scribe Attestation    I,:   Yaw Restrepo am acting as a scribe while in the presence of the attending physician :        I,:   Adelia Lee MD personally performed the services described in this documentation    as scribed in my presence :

## 2020-02-21 ENCOUNTER — APPOINTMENT (OUTPATIENT)
Dept: PHYSICAL THERAPY | Facility: REHABILITATION | Age: 55
End: 2020-02-21
Payer: COMMERCIAL

## 2020-02-28 ENCOUNTER — OFFICE VISIT (OUTPATIENT)
Dept: PHYSICAL THERAPY | Facility: REHABILITATION | Age: 55
End: 2020-02-28
Payer: COMMERCIAL

## 2020-02-28 DIAGNOSIS — M75.122 COMPLETE TEAR OF LEFT ROTATOR CUFF, UNSPECIFIED WHETHER TRAUMATIC: Primary | ICD-10-CM

## 2020-02-28 PROCEDURE — 97112 NEUROMUSCULAR REEDUCATION: CPT

## 2020-02-28 PROCEDURE — 97110 THERAPEUTIC EXERCISES: CPT

## 2020-02-28 NOTE — PROGRESS NOTES
Daily Note     Today's date: 2020  Patient name: Eloy Rodriguez  : 1965  MRN: [id number]  Referring provider: Giovanna Hollins PA-C  Dx:   Encounter Diagnosis     ICD-10-CM    1  Complete tear of left rotator cuff, unspecified whether traumatic M75 122        Start Time: 730  Stop Time: 0820  Total time in clinic (min): 50 minutes    Subjective: Patient reports addition of exercises at gym and running have been going well, notes mild soreness after  Patient states feeling tight arriving to therapy this morning           Objective: See treatment diary below  Precautions: DOS 19        Manual  2/28 12/23 12/27 12/30 1/3 1/7 1/13 1/21 2/3  2/   L shoulder PROM  15 min 15 min 15 min 15 min 20 min 15 min 15 min 15 min  10 min  15 min    measurements                 10 min     Rhythmic stabalization 90 deg  3x30"                NV 3x 30"                                                         Exercise Diary  2/28 12/23 12/27 12/30 1/3 1/7 1/13 1/21  2/3  2/14   Elbow AROM np 10x x15 x20 20x x30 np 1# x20  np  np   Wall slide flexion D/c        5" 10x L  d/c   sidelying ER AROM 20x L        5" 15x L  20x L   Scap Retraction np 5" 10x 5"x20 10" x10 10" 10x 10" x10 10"x10 10"x10  np  np   Active scaption to 90 deg 3 lbs 18x        10x L  2 lbs 20x   Supine wand flexion  20x           5"x10 5"x10  np  20x   Supine serratus punches  3 lbs 5"x15           5"x10 5"x15  5" 15x  5" 20x   Prone shoulder ext to hip  np           5"x10 5"x15  np  np   Table slide flex  D/c           5"x10 5"x10  np  d/c    posterior capsule stretch  3x 15" L                NV  3x15"    submax isometrics (no add)  5"x10 each                NV  5" 5x each    pulleys  3 min                  3 min                                                                                                                                                                                                         Modalities                                                                                  Assessment: Tolerated treatment well  Patient required cueing to correct UT compensation during active scaption  Addition of weight to s/l ER fatigued patient but demonstrated good form throughout exercise  Progressed repetitions of shoulder isometrics with good tolerance and relief during posterior capsule stretch  Limitations present in all directions with PROM secondary to soft tissue tightness, greatest limitation into ER  Patient exhibited good technique with therapeutic exercises and would benefit from continued PT  Plan: Progress treament per protocol  Pt was treated via unsupervised time from 8:17 - 8:20 am and was :1 with treating clinician for rest of session      Patient was treated by LEOPOLDO Houston under the direct supervision of Alfonso Jordan PTA

## 2020-03-10 ENCOUNTER — OFFICE VISIT (OUTPATIENT)
Dept: OBGYN CLINIC | Facility: MEDICAL CENTER | Age: 55
End: 2020-03-10

## 2020-03-10 VITALS
BODY MASS INDEX: 25.77 KG/M2 | DIASTOLIC BLOOD PRESSURE: 89 MMHG | SYSTOLIC BLOOD PRESSURE: 152 MMHG | HEART RATE: 77 BPM | HEIGHT: 70 IN | WEIGHT: 180 LBS

## 2020-03-10 DIAGNOSIS — M75.122 COMPLETE TEAR OF LEFT ROTATOR CUFF, UNSPECIFIED WHETHER TRAUMATIC: Primary | ICD-10-CM

## 2020-03-10 PROCEDURE — 99024 POSTOP FOLLOW-UP VISIT: CPT | Performed by: ORTHOPAEDIC SURGERY

## 2020-03-10 PROCEDURE — 3008F BODY MASS INDEX DOCD: CPT | Performed by: ORTHOPAEDIC SURGERY

## 2020-03-10 PROCEDURE — 3008F BODY MASS INDEX DOCD: CPT | Performed by: PHYSICIAN ASSISTANT

## 2020-03-10 NOTE — PROGRESS NOTES
Orthopaedic Surgery - Office Note  Dione Aly (6 y o  male)   : 1965   MRN: [id number]  Encounter Date: 3/10/2020    Chief Complaint   Patient presents with    Left Shoulder - Follow-up       Assessment / Plan  Status post left shoulder arthroscopy with rotator cuff repair and subacromial decompression 2019  · Patient can progress activity as tolerated at this time for both the lower and upper extremities  We did discuss slowly increasing strengthening exercises at this time  And that it would take about 6 months to a year to get full function of the shoulder back  · Home exercise program reviewed  · Continue with ice and analgesics as needed  · Continue outpatient PT  Return if symptoms worsen or fail to improve  History of Present Illness  Dione Aly is a 47 y o  male follow-up status post left shoulder arthroscopy with rotator cuff repair and subacromial decompression performed on 2019  Patient is doing really well at this time complains of no pain at this time  He has gone back to running and exercise at this time  He has also started some strengthening for his arm and is doing well  He denies any distal paresthesias  Review of Systems  Pertinent items are noted in HPI  All other systems were reviewed and are negative  Physical Exam  /89   Pulse 77   Ht 5' 10" (1 778 m)   Wt 81 6 kg (180 lb)   BMI 25 83 kg/m²   Cons: Appears well  No apparent distress  Psych: Alert  Oriented x3  Mood and affect normal   Eyes: PERRLA, EOMI  Resp: Normal effort  No audible wheezing or stridor  CV: Palpable pulse  No discernable arrhythmia  No LE edema  Lymph:  No palpable cervical, axillary, or inguinal lymphadenopathy  Skin: Warm  No palpable masses  No visible lesions  Neuro: Normal muscle tone  Normal and symmetric DTR's  Left Shoulder Exam  Alignment / Posture:  Normal shoulder posture  Inspection:  No swelling   Incisions healed  Palpation:  No tenderness  ROM:  Shoulder °  Shoulder ER 70°  Shoulder IR L1  Strength:  Supraspinatus 4+/5  Infraspinatus 4+/5  Subscapularis Five/5  Stability:  No objective shoulder instability  Tests: (-) Belly press  Neurovascular:  Sensation intact in Ax/R/M/U nerve distributions  2+ radial pulse  Studies Reviewed  No studies to review    Procedures  No procedures today  Medical, Surgical, Family, and Social History  The patient's medical history, family history, and social history, were reviewed and updated as appropriate      Past Medical History:   Diagnosis Date    Disease of thyroid gland     Shoulder pain     left shoulder pain    Tinnitus        Past Surgical History:   Procedure Laterality Date    COLONOSCOPY      KNEE ARTHROSCOPY Left     [place] SHLDR ARTHROSCOP,SURG,W/ROTAT CUFF REPR Left 12/12/2019    Procedure: REPAIR ROTATOR CUFF  ARTHROSCOPIC;  Surgeon: Mu Calixto MD;  Location: [facility];  Service: Orthopedics    WISDOM TOOTH EXTRACTION         Family History   Problem Relation Age of Onset    Coronary artery disease Mother     Hyperlipidemia Brother     Coronary artery disease Family        Social History     Occupational History    Occupation: EMPLOYED   Tobacco Use    Smoking status: Never Smoker    Smokeless tobacco: Never Used   Substance and Sexual Activity    Alcohol use: Yes     Frequency: Monthly or less     Drinks per session: 1 or 2    Drug use: No    Sexual activity: Not on file       Allergies   Allergen Reactions    Simvastatin Myalgia         Current Outpatient Medications:     aspirin (ECOTRIN LOW STRENGTH) 81 mg EC tablet, Take 81 mg by mouth daily, Disp: , Rfl:     fenofibrate (TRIGLIDE) 160 MG tablet, TAKE 1 TABLET DAILY (Patient taking differently: Take 160 mg by mouth daily ), Disp: 90 tablet, Rfl: 4    levothyroxine 25 mcg tablet, TAKE 1 TABLET DAILY (NEED OFFICE VISIT) (Patient taking differently: Take 25 mcg by mouth daily ), Disp: 90 tablet, Rfl: 4    naproxen (NAPROSYN) 500 mg tablet, Take 1 tablet (500 mg total) by mouth 2 (two) times a day with meals (Patient not taking: Reported on 3/10/2020), Disp: 60 tablet, Rfl: 0    ondansetron (ZOFRAN-ODT) 4 mg disintegrating tablet, Take 1 tablet (4 mg total) by mouth every 8 (eight) hours as needed for nausea or vomiting (Patient not taking: Reported on 3/10/2020), Disp: 15 tablet, Rfl: 0      Fariba Pacheco PA-C    Scribe Attestation    I,:    am acting as a scribe while in the presence of the attending physician :        I,:    personally performed the services described in this documentation    as scribed in my presence :

## 2020-03-17 NOTE — PROGRESS NOTES
3/17/2020  Patient was discharged by surgeon to return to [facility] prior to re-evaluation  Foto improved by 68 points to 87/100  Please see last RE for goals and measurements

## 2020-03-27 ENCOUNTER — TELEMEDICINE (OUTPATIENT)
Dept: FAMILY MEDICINE CLINIC | Facility: CLINIC | Age: 55
End: 2020-03-27
Payer: COMMERCIAL

## 2020-03-27 DIAGNOSIS — Z20.828 EXPOSURE TO SARS-ASSOCIATED CORONAVIRUS: ICD-10-CM

## 2020-03-27 DIAGNOSIS — J30.1 ALLERGIC RHINITIS DUE TO POLLEN, UNSPECIFIED SEASONALITY: ICD-10-CM

## 2020-03-27 DIAGNOSIS — R05.9 COUGH: Primary | ICD-10-CM

## 2020-03-27 DIAGNOSIS — E03.9 HYPOTHYROIDISM, UNSPECIFIED TYPE: ICD-10-CM

## 2020-03-27 PROCEDURE — 99214 OFFICE O/P EST MOD 30 MIN: CPT | Performed by: PHYSICIAN ASSISTANT

## 2020-03-27 RX ORDER — ALBUTEROL SULFATE 90 UG/1
2 AEROSOL, METERED RESPIRATORY (INHALATION) EVERY 6 HOURS PRN
Qty: 1 INHALER | Refills: 0 | Status: SHIPPED | OUTPATIENT
Start: 2020-03-27 | End: 2021-09-09 | Stop reason: ALTCHOICE

## 2020-03-27 NOTE — PROGRESS NOTES
Virtual Regular Visit  Patient Instructions   1  cough-patient has had persistent cough for 2 weeks and significant history of travel prior all over the [place] from January until March  For this reason I would recommend he get COVID-19 testing  Orders have been placed for mobile van at [facility] and patient is aware of how to proceed there  We will contact him with results  In the meantime I will send in a prescription for albuterol inhaler to be used as necessary  2  Diarrhea-patient has had some mild loose stool and a general stomach discomfort which has not worsened over the past 10 days  Recommend continued supportive care with Pepto-Bismol and fluids  Follow up if symptoms would worsen  3  Hypothyroidism-presently stable, no medication changes  4  Hyperlipidemia-presently stable, no medication changes  Problem List Items Addressed This Visit        Endocrine    Hypothyroidism       Respiratory    Allergic rhinitis      Other Visit Diagnoses     Cough    -  Primary    Relevant Medications    albuterol (Ventolin HFA) 90 mcg/act inhaler    Exposure to SARS-associated coronavirus        Relevant Orders    MISC COVID-19 TEST- Collected at Mobile Vans or [facility]          BMI Counseling: There is no height or weight on file to calculate BMI  The BMI is above normal  Nutrition recommendations include decreasing portion sizes  Exercise recommendations include exercising 3-5 times per week  Reason for visit is cough  Encounter provider Roldan Rudd PA-C    Provider located at [address] [place] [address] PRIMARY CARE  [place] [id number]      Recent Visits  No visits were found meeting these conditions     Showing recent visits within past 7 days and meeting all other requirements     Today's Visits  Date Type Provider Dept   03/27/20 Telemedicine Roldan Rudd PA-C [facility]   Showing today's visits and meeting all other requirements Future Appointments  Date Type Provider Dept   03/27/20 Telemedicine Zaid Self PA-C [facility]   Showing future appointments within next 150 days and meeting all other requirements        After connecting through [employer], the patient was identified by name and date of birth  Dione Aly was informed that this is a telemedicine visit and that the visit is being conducted through [employer] and patient was informed that this is not a secure, HIPAA-complaint platform  he agrees to proceed  which may not be secure and therefore, might not be HIPAA-compliant  My office door was closed  No one else was in the room  He acknowledged consent and understanding of privacy and security of the video platform  The patient has agreed to participate and understands they can discontinue the visit at any time  Subjective  Dione Aly is a 47 y o  male see HPI        Past Medical History:   Diagnosis Date    Disease of thyroid gland     Shoulder pain     left shoulder pain    Tinnitus        Past Surgical History:   Procedure Laterality Date    COLONOSCOPY      KNEE ARTHROSCOPY Left     [place] SHLDR ARTHROSCOP,SURG,W/ROTAT CUFF REPR Left 12/12/2019    Procedure: REPAIR ROTATOR CUFF  ARTHROSCOPIC;  Surgeon: Krissy Boss MD;  Location: [facility];  Service: Orthopedics    WISDOM TOOTH EXTRACTION         Current Outpatient Medications   Medication Sig Dispense Refill    aspirin (ECOTRIN LOW STRENGTH) 81 mg EC tablet Take 81 mg by mouth daily      fenofibrate (TRIGLIDE) 160 MG tablet TAKE 1 TABLET DAILY (Patient taking differently: Take 160 mg by mouth daily ) 90 tablet 4    levothyroxine 25 mcg tablet TAKE 1 TABLET DAILY (NEED OFFICE VISIT) (Patient taking differently: Take 25 mcg by mouth daily ) 90 tablet 4    albuterol (Ventolin HFA) 90 mcg/act inhaler Inhale 2 puffs every 6 (six) hours as needed for wheezing 1 Inhaler 0    naproxen (NAPROSYN) 500 mg tablet Take 1 tablet (500 mg total) by mouth 2 (two) times a day with meals (Patient not taking: Reported on 3/10/2020) 60 tablet 0    ondansetron (ZOFRAN-ODT) 4 mg disintegrating tablet Take 1 tablet (4 mg total) by mouth every 8 (eight) hours as needed for nausea or vomiting (Patient not taking: Reported on 3/10/2020) 15 tablet 0     No current facility-administered medications for this visit  Allergies   Allergen Reactions    Simvastatin Myalgia       Review of Systems   Constitutional: Positive for chills, fatigue and fever  HENT: Negative for rhinorrhea, sinus pain and sore throat  Respiratory: Positive for cough  Negative for shortness of breath and wheezing  Gastrointestinal: Positive for abdominal pain and diarrhea  Musculoskeletal: Negative for arthralgias  Physical Exam   Constitutional: He is oriented to person, place, and time  He appears well-developed and well-nourished  No distress  HENT:   Head: Normocephalic and atraumatic  Eyes: Conjunctivae are normal    Neck: Normal range of motion  Pulmonary/Chest: Effort normal    Abdominal: Soft  Patient is able to press on abdomen without discomfort  Musculoskeletal: He exhibits no edema  Neurological: He is alert and oriented to person, place, and time  Skin: No rash noted  Psychiatric: He has a normal mood and affect  HPI  HPI:  This is a 49-year-old gentleman that presents via virtual video visit via [employer] platform  He is complaining of a cough that has been persistent for about 2 weeks  It is a dry cough that has been worse during the daytime  He has had some intermittent chills and subjective fever symptoms but is not aware of any specific fever  He has some concerned because he did have significant travel all across the [place] 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  He is not having any shortness of breath or trouble breathing    Does have some generalized abdominal discomfort which has been present for about 10 days  He has had some loose stool with it but does not seem to be worsening  He is not in significant discomfort at this time  He is able to press on his abdomen without problem  Data to review:          COVID 19 Instructions    Sanjuana Kenneth was advised to limit contact with others to essential tasks such as getting food, medications, and medical care  Proper handwashing reviewed, and Hand sanitzer when washing is not available  If the patient develops symptoms of COVID 19, the patient should call the office as soon as possible  Patient was offered video visit today  Patient refused: YES/NO: no    Examination today was by video/telephone visit  Observational measures that are included were noted on video or by patient/guardian description  Vital signs are include in this visit were obtained by the patient, using their own equipment at home  Virtual Visit expected code: [id number]        I spent 15 minutes with the patient during this visit

## 2020-03-27 NOTE — PATIENT INSTRUCTIONS
1  cough-patient has had persistent cough for 2 weeks and significant history of travel prior all over the [place] from January until March  For this reason I would recommend he get COVID-19 testing  Orders have been placed for mobile van at [employer] [facility] and patient is aware of how to proceed there  We will contact him with results  In the meantime I will send in a prescription for albuterol inhaler to be used as necessary  2  Diarrhea-patient has had some mild loose stool and a general stomach discomfort which has not worsened over the past 10 days  Recommend continued supportive care with Pepto-Bismol and fluids  Follow up if symptoms would worsen  3  Hypothyroidism-presently stable, no medication changes  4  Hyperlipidemia-presently stable, no medication changes

## 2020-03-28 DIAGNOSIS — Z20.828 EXPOSURE TO SARS-ASSOCIATED CORONAVIRUS: ICD-10-CM

## 2020-03-28 PROCEDURE — 87635 SARS-COV-2 COVID-19 AMP PRB: CPT

## 2020-04-02 LAB — SARS-COV-2 RNA SPEC QL NAA+PROBE: NOT DETECTED

## 2020-04-04 DIAGNOSIS — F52.21 ERECTILE DYSFUNCTION OF NON-ORGANIC ORIGIN: Primary | ICD-10-CM

## 2020-04-05 RX ORDER — SILDENAFIL 100 MG/1
TABLET, FILM COATED ORAL
Qty: 10 TABLET | Refills: 5 | Status: SHIPPED | OUTPATIENT
Start: 2020-04-05 | End: 2020-09-01 | Stop reason: ALTCHOICE

## 2020-07-10 DIAGNOSIS — F52.21 ERECTILE DYSFUNCTION OF NON-ORGANIC ORIGIN: ICD-10-CM

## 2020-07-10 RX ORDER — TADALAFIL 5 MG/1
TABLET ORAL
Qty: 30 TABLET | Refills: 5 | Status: SHIPPED | OUTPATIENT
Start: 2020-07-10 | End: 2021-09-09 | Stop reason: ALTCHOICE

## 2020-09-01 ENCOUNTER — OFFICE VISIT (OUTPATIENT)
Dept: FAMILY MEDICINE CLINIC | Facility: CLINIC | Age: 55
End: 2020-09-01
Payer: COMMERCIAL

## 2020-09-01 VITALS
HEIGHT: 70 IN | TEMPERATURE: 97.8 F | HEART RATE: 68 BPM | WEIGHT: 181.38 LBS | SYSTOLIC BLOOD PRESSURE: 130 MMHG | BODY MASS INDEX: 25.97 KG/M2 | DIASTOLIC BLOOD PRESSURE: 68 MMHG

## 2020-09-01 DIAGNOSIS — F52.21 ERECTILE DYSFUNCTION OF NON-ORGANIC ORIGIN: ICD-10-CM

## 2020-09-01 DIAGNOSIS — E03.9 HYPOTHYROIDISM, UNSPECIFIED TYPE: ICD-10-CM

## 2020-09-01 DIAGNOSIS — E78.5 HYPERLIPIDEMIA, UNSPECIFIED HYPERLIPIDEMIA TYPE: ICD-10-CM

## 2020-09-01 DIAGNOSIS — R53.83 OTHER FATIGUE: ICD-10-CM

## 2020-09-01 DIAGNOSIS — Z00.00 HEALTH CARE MAINTENANCE: Primary | ICD-10-CM

## 2020-09-01 PROCEDURE — 1036F TOBACCO NON-USER: CPT | Performed by: PHYSICIAN ASSISTANT

## 2020-09-01 PROCEDURE — 99396 PREV VISIT EST AGE 40-64: CPT | Performed by: PHYSICIAN ASSISTANT

## 2020-09-01 NOTE — PATIENT INSTRUCTIONS
Assessment/plan:  1  Healthcare maintenance-healthy appearing 29-year-old gentleman  He continues regular physical exercise and stays in good shape  He continues to watch his diet  Labs will be ordered  Follow-up with results as necessary  2   Hyperlipidemia - this has been stable with fenofibrate  Will reassess labs  3   Hypothyroidism - stable with levothyroxine 25 mcg daily  4   Fatigue-patient interested in getting testosterone level  He does have occasional tiredness and checking this would be reasonable  5   Erectile dysfunction -presently stable with generic Cialis when necessary

## 2020-09-01 NOTE — PROGRESS NOTES
Assessment and Plan:  Patient Instructions    Assessment/plan:  1  Healthcare maintenance-healthy appearing 80-year-old gentleman  He continues regular physical exercise and stays in good shape  He continues to watch his diet  Labs will be ordered  Follow-up with results as necessary  2   Hyperlipidemia - this has been stable with fenofibrate  Will reassess labs  3   Hypothyroidism - stable with levothyroxine 25 mcg daily  4   Fatigue-patient interested in getting testosterone level  He does have occasional tiredness and checking this would be reasonable  5   Erectile dysfunction -presently stable with generic Cialis when necessary          Problem List Items Addressed This Visit        Endocrine    Hypothyroidism    Relevant Orders    CBC and differential    Comprehensive metabolic panel    Lipid Panel with Direct LDL reflex    PSA, Total Screen    TSH, 3rd generation with Free T4 reflex       Other    Erectile dysfunction of non-organic origin    Hyperlipidemia    Relevant Orders    CBC and differential    Comprehensive metabolic panel    Lipid Panel with Direct LDL reflex    PSA, Total Screen    TSH, 3rd generation with Free T4 reflex      Other Visit Diagnoses     Health care maintenance    -  Primary    Other fatigue        Relevant Orders    Testosterone, free, total                 Diagnoses and all orders for this visit:    Health care maintenance    Hyperlipidemia, unspecified hyperlipidemia type  -     CBC and differential  -     Comprehensive metabolic panel  -     Lipid Panel with Direct LDL reflex  -     PSA, Total Screen  -     TSH, 3rd generation with Free T4 reflex    Hypothyroidism, unspecified type  -     CBC and differential  -     Comprehensive metabolic panel  -     Lipid Panel with Direct LDL reflex  -     PSA, Total Screen  -     TSH, 3rd generation with Free T4 reflex    Erectile dysfunction of non-organic origin    Other fatigue  -     Testosterone, free, total; Future Subjective:      Patient ID: Harley Eisenmenger is a 47 y o  male  CC:    Chief Complaint   Patient presents with    Well Check     need bw rx  mgb       HPI:     HPI:  This is a 22-year-old gentleman that presents to the office for annual well visit  He has been feeling well without any acute complaints  He is interested in getting yearly blood work completed  Does have a history of hyperlipidemia and continues on fenofibrate as well as hypothyroidism which has been stable on levothyroxine 25 mcg daily  He continues to exercise regularly  The following portions of the patient's history were reviewed and updated as appropriate: allergies, current medications, past family history, past medical history, past social history, past surgical history and problem list       Review of Systems   Constitutional: Negative for chills, fatigue and fever  HENT: Negative for congestion, ear pain and sinus pressure  Eyes: Negative for visual disturbance  Respiratory: Negative for cough, chest tightness and shortness of breath  Cardiovascular: Negative for chest pain and palpitations  Gastrointestinal: Negative for diarrhea, nausea and vomiting  Endocrine: Negative for polyuria  Genitourinary: Negative for dysuria and frequency  Musculoskeletal: Negative for arthralgias and myalgias  Skin: Negative for pallor and rash  Neurological: Negative for dizziness, weakness, light-headedness, numbness and headaches  Psychiatric/Behavioral: Negative for agitation, behavioral problems and sleep disturbance  All other systems reviewed and are negative          Data to review:       Objective:    Vitals:    09/01/20 0922   BP: 130/68   BP Location: Left arm   Patient Position: Sitting   Cuff Size: Standard   Pulse: 68   Temp: 97 8 °F (36 6 °C)   TempSrc: Temporal   Weight: 82 3 kg (181 lb 6 oz)   Height: 5' 10" (1 778 m)        Physical Exam  Constitutional:       General: He is not in acute distress  Appearance: He is well-developed  HENT:      Head: Normocephalic and atraumatic  Right Ear: Tympanic membrane normal       Left Ear: Tympanic membrane normal    Eyes:      Conjunctiva/sclera: Conjunctivae normal    Neck:      Musculoskeletal: Normal range of motion  Cardiovascular:      Rate and Rhythm: Normal rate and regular rhythm  Pulmonary:      Effort: Pulmonary effort is normal    Abdominal:      General: Abdomen is flat  Bowel sounds are normal  There is no distension  Palpations: Abdomen is soft  There is no mass  Musculoskeletal: Normal range of motion  Skin:     General: Skin is warm  Findings: No rash  Neurological:      Mental Status: He is alert and oriented to person, place, and time     Psychiatric:         Mood and Affect: Mood normal

## 2020-11-18 DIAGNOSIS — E03.9 HYPOTHYROIDISM, UNSPECIFIED TYPE: ICD-10-CM

## 2020-11-18 RX ORDER — LEVOTHYROXINE SODIUM 0.03 MG/1
25 TABLET ORAL DAILY
Qty: 90 TABLET | Refills: 3 | Status: SHIPPED | OUTPATIENT
Start: 2020-11-18 | End: 2021-11-15

## 2020-12-29 DIAGNOSIS — E78.2 MIXED HYPERLIPIDEMIA: ICD-10-CM

## 2020-12-29 RX ORDER — FENOFIBRATE 160 MG/1
TABLET ORAL
Qty: 90 TABLET | Refills: 3 | Status: SHIPPED | OUTPATIENT
Start: 2020-12-29 | End: 2021-12-03

## 2021-01-19 ENCOUNTER — OFFICE VISIT (OUTPATIENT)
Dept: FAMILY MEDICINE CLINIC | Facility: CLINIC | Age: 56
End: 2021-01-19
Payer: COMMERCIAL

## 2021-01-19 VITALS
DIASTOLIC BLOOD PRESSURE: 72 MMHG | WEIGHT: 189 LBS | TEMPERATURE: 97.5 F | RESPIRATION RATE: 18 BRPM | HEIGHT: 70 IN | BODY MASS INDEX: 27.06 KG/M2 | HEART RATE: 70 BPM | SYSTOLIC BLOOD PRESSURE: 144 MMHG

## 2021-01-19 DIAGNOSIS — M54.41 ACUTE BILATERAL LOW BACK PAIN WITH BILATERAL SCIATICA: Primary | ICD-10-CM

## 2021-01-19 DIAGNOSIS — E78.5 HYPERLIPIDEMIA, UNSPECIFIED HYPERLIPIDEMIA TYPE: ICD-10-CM

## 2021-01-19 DIAGNOSIS — R29.898 WEAKNESS OF BOTH LOWER EXTREMITIES: ICD-10-CM

## 2021-01-19 DIAGNOSIS — M54.42 ACUTE BILATERAL LOW BACK PAIN WITH BILATERAL SCIATICA: Primary | ICD-10-CM

## 2021-01-19 DIAGNOSIS — E03.9 HYPOTHYROIDISM, UNSPECIFIED TYPE: ICD-10-CM

## 2021-01-19 PROCEDURE — 3008F BODY MASS INDEX DOCD: CPT | Performed by: PHYSICIAN ASSISTANT

## 2021-01-19 PROCEDURE — 99214 OFFICE O/P EST MOD 30 MIN: CPT | Performed by: PHYSICIAN ASSISTANT

## 2021-01-19 PROCEDURE — 3725F SCREEN DEPRESSION PERFORMED: CPT | Performed by: PHYSICIAN ASSISTANT

## 2021-01-19 PROCEDURE — 1036F TOBACCO NON-USER: CPT | Performed by: PHYSICIAN ASSISTANT

## 2021-01-19 RX ORDER — CYCLOBENZAPRINE HCL 5 MG
5 TABLET ORAL 3 TIMES DAILY PRN
COMMUNITY
Start: 2021-01-17 | End: 2021-09-09 | Stop reason: ALTCHOICE

## 2021-01-19 RX ORDER — MELOXICAM 15 MG/1
15 TABLET ORAL DAILY
COMMUNITY
Start: 2021-01-17 | End: 2021-09-09 | Stop reason: ALTCHOICE

## 2021-01-19 RX ORDER — PREDNISONE 20 MG/1
60 TABLET ORAL DAILY
Qty: 15 TABLET | Refills: 0 | Status: SHIPPED | OUTPATIENT
Start: 2021-01-19 | End: 2021-01-24

## 2021-01-19 NOTE — PROGRESS NOTES
Assessment and Plan:  Patient Instructions   Assessment/plan:  1  Acute low back pain with progressive neurological deficit-patient is having jolts of pain down bilateral legs and some weakness and clumsy feeling of the legs bilaterally  He is not having any loss of bowel or bladder function  Would recommend MRI to assess for nerve impingement  Recommend starting prednisone 60 mg daily for 5 days  He may continue Flexeril up to 3 times daily and meloxicam with food  Pending MRI results consider evaluation by [facility] Pain [facility]  2  Hyperlipidemia-stable, continue regular follow-up  3  Hypothyroidism-stable, continue regular blood work and follow-up  Problem List Items Addressed This Visit        Endocrine    Hypothyroidism    Relevant Medications    predniSONE 20 mg tablet       Other    Hyperlipidemia      Other Visit Diagnoses     Acute bilateral low back pain with bilateral sciatica    -  Primary    Relevant Medications    predniSONE 20 mg tablet    Other Relevant Orders    MRI lumbar spine wo contrast    Weakness of both lower extremities        Relevant Medications    predniSONE 20 mg tablet    Other Relevant Orders    MRI lumbar spine wo contrast                 Diagnoses and all orders for this visit:    Acute bilateral low back pain with bilateral sciatica  -     MRI lumbar spine wo contrast; Future  -     predniSONE 20 mg tablet; Take 3 tablets (60 mg total) by mouth daily for 5 days    Weakness of both lower extremities  -     MRI lumbar spine wo contrast; Future  -     predniSONE 20 mg tablet; Take 3 tablets (60 mg total) by mouth daily for 5 days    Hyperlipidemia, unspecified hyperlipidemia type    Hypothyroidism, unspecified type    Other orders  -     cyclobenzaprine (FLEXERIL) 5 mg tablet; Take 5 mg by mouth Three times daily as needed  -     meloxicam (MOBIC) 15 mg tablet; Take 15 mg by mouth daily              Subjective:      Patient ID: Olamide Zarate is a 54 y  o  male     CC:    Chief Complaint   Patient presents with    Back Pain     lower back, it's been a couple of weeks, patient was seen in [facility] on sunday  HPI:    HPI:  This is a 51-year-old gentleman that presents to the office with back pain that has been bothering him for about 2 weeks  He was seen at the urgent care center and given meloxicam and Flexeril  Despite this management he feels that in the past day or 2 things have been worsening  He is now getting jolts of electrical shocking pain going down bilateral legs  He has felt some numbness in the legs and felt clumsy with the legs that they are weak or do not want to pick them self up  He has not had any loss of bowel or bladder function  He does not recall any injury at the onset of symptoms  He has not had a prior history of trauma or back pain in the past       The following portions of the patient's history were reviewed and updated as appropriate: allergies, current medications, past family history, past medical history, past social history, past surgical history and problem list       Review of Systems   Constitutional: Negative for chills, fatigue and fever  HENT: Negative for congestion, ear pain and sinus pressure  Eyes: Negative for visual disturbance  Respiratory: Negative for cough, chest tightness and shortness of breath  Cardiovascular: Negative for chest pain and palpitations  Gastrointestinal: Negative for diarrhea, nausea and vomiting  Endocrine: Negative for polyuria  Genitourinary: Negative for dysuria and frequency  Musculoskeletal: Positive for arthralgias, back pain and gait problem  Negative for myalgias  Skin: Negative for pallor and rash  Neurological: Positive for weakness and numbness  Negative for dizziness, light-headedness and headaches  Psychiatric/Behavioral: Negative for agitation, behavioral problems and sleep disturbance  All other systems reviewed and are negative          Data to review:       Objective:    Vitals:    01/19/21 1332   BP: 144/72   BP Location: Left arm   Patient Position: Sitting   Cuff Size: Adult   Pulse: 70   Resp: 18   Temp: 97 5 °F (36 4 °C)   TempSrc: Tympanic   Weight: 85 7 kg (189 lb)   Height: 5' 10" (1 778 m)        Physical Exam  Constitutional:       General: He is not in acute distress  Appearance: He is well-developed  HENT:      Head: Normocephalic and atraumatic  Right Ear: Tympanic membrane normal       Left Ear: Tympanic membrane normal    Eyes:      Conjunctiva/sclera: Conjunctivae normal    Neck:      Musculoskeletal: Normal range of motion  Cardiovascular:      Rate and Rhythm: Normal rate and regular rhythm  Pulmonary:      Effort: Pulmonary effort is normal    Abdominal:      General: Abdomen is flat  Bowel sounds are normal  There is no distension  Palpations: Abdomen is soft  There is no mass  Musculoskeletal: Normal range of motion  General: Tenderness present  Comments: Patient does have poor range of motion with flexion and extension of the lumbar spine  There is some point tenderness to the left lateral SI joint  Skin:     General: Skin is warm  Findings: No rash  Neurological:      Mental Status: He is alert and oriented to person, place, and time  Comments: Patient reports some numbness to sensation on the left lateral leg in comparison to the right  Positive straight leg raise bilaterally  Psychiatric:         Mood and Affect: Mood normal            BMI Counseling: Body mass index is 27 12 kg/m²  The BMI is above normal  Nutrition recommendations include decreasing portion sizes  Exercise recommendations include exercising 3-5 times per week  BMI Counseling: Body mass index is 27 12 kg/m²  The BMI is above normal  Nutrition recommendations include reducing portion sizes

## 2021-01-19 NOTE — PATIENT INSTRUCTIONS
Assessment/plan:  1  Acute low back pain with progressive neurological deficit-patient is having jolts of pain down bilateral legs and some weakness and clumsy feeling of the legs bilaterally  He is not having any loss of bowel or bladder function  Would recommend MRI to assess for nerve impingement  Recommend starting prednisone 60 mg daily for 5 days  He may continue Flexeril up to 3 times daily and meloxicam with food  Pending MRI results consider evaluation by Spine and Pain Management  2  Hyperlipidemia-stable, continue regular follow-up  3  Hypothyroidism-stable, continue regular blood work and follow-up

## 2021-01-27 ENCOUNTER — HOSPITAL ENCOUNTER (OUTPATIENT)
Dept: MRI IMAGING | Facility: HOSPITAL | Age: 56
Discharge: HOME | End: 2021-01-27
Payer: COMMERCIAL

## 2021-01-27 DIAGNOSIS — M54.41 ACUTE BILATERAL LOW BACK PAIN WITH BILATERAL SCIATICA: ICD-10-CM

## 2021-01-27 DIAGNOSIS — M54.42 ACUTE BILATERAL LOW BACK PAIN WITH BILATERAL SCIATICA: ICD-10-CM

## 2021-01-27 DIAGNOSIS — R29.898 WEAKNESS OF BOTH LOWER EXTREMITIES: ICD-10-CM

## 2021-01-27 PROCEDURE — 72148 MRI LUMBAR SPINE W/O DYE: CPT

## 2021-01-27 PROCEDURE — G1004 CDSM NDSC: HCPCS

## 2021-02-01 ENCOUNTER — TELEPHONE (OUTPATIENT)
Dept: FAMILY MEDICINE CLINIC | Facility: CLINIC | Age: 56
End: 2021-02-01

## 2021-02-01 DIAGNOSIS — M54.16 LUMBAR RADICULOPATHY: Primary | ICD-10-CM

## 2021-02-01 NOTE — TELEPHONE ENCOUNTER
MRI of the lumbar spine shows multiple levels of bulging discs, protrusions, and stenosis of the spinal canal   Recommend evaluation by Sp[facility] Pain Management

## 2021-02-02 ENCOUNTER — TELEPHONE (OUTPATIENT)
Dept: PHYSICAL THERAPY | Facility: OTHER | Age: 56
End: 2021-02-02

## 2021-02-02 NOTE — TELEPHONE ENCOUNTER
Nurse reached out to discuss recent referral entered for  [facility] program and offerings  Nurse reviewed triage process and the note recommending pt see Spine and Pain vs PT eval  Nurse discussed the two and stated he would then schedule and appointment for evaluation  Patient declined to participate in the program at this time and would like to discuss this more and where to go with his provider  Nurse stated she understood and he has the information on what [facility] can assist him with  Nurse confirmed patient has [facility] contact information and encouraged to call program back if needed  Patient agreed and very appreciative of call and discussion  Nurse wished him well and referral closed per protocol

## 2021-02-03 NOTE — TELEPHONE ENCOUNTER
Patient notified of results and recommendations  Patient asked to schedule an OV with Jairo oseguera

## 2021-02-05 ENCOUNTER — OFFICE VISIT (OUTPATIENT)
Dept: FAMILY MEDICINE CLINIC | Facility: CLINIC | Age: 56
End: 2021-02-05
Payer: COMMERCIAL

## 2021-02-05 VITALS
HEART RATE: 64 BPM | WEIGHT: 186.4 LBS | HEIGHT: 70 IN | DIASTOLIC BLOOD PRESSURE: 80 MMHG | SYSTOLIC BLOOD PRESSURE: 122 MMHG | BODY MASS INDEX: 26.69 KG/M2

## 2021-02-05 DIAGNOSIS — M54.16 LUMBAR RADICULOPATHY: Primary | ICD-10-CM

## 2021-02-05 PROCEDURE — 3008F BODY MASS INDEX DOCD: CPT | Performed by: PHYSICIAN ASSISTANT

## 2021-02-05 PROCEDURE — 1036F TOBACCO NON-USER: CPT | Performed by: PHYSICIAN ASSISTANT

## 2021-02-05 PROCEDURE — 99213 OFFICE O/P EST LOW 20 MIN: CPT | Performed by: PHYSICIAN ASSISTANT

## 2021-02-05 NOTE — PROGRESS NOTES
Assessment and Plan:  Patient Instructions   Assessment/plan:  1  Lumbar radiculopathy-symptoms seem to be consistent with a right-sided L5-S1 radiculopathy  Currently his symptoms are improved after prednisone and muscle relaxer  He does have some foraminal narrowing and disc herniation at the L5-S1 level which could be problematic in the future  He was encouraged to consider physical therapy and he continues regular exercises and stretching and core strengthening at home  If in the future he has more significant exacerbation that is not alleviated with anti-inflammatories we will consider evaluation by pain management for injection therapies if needed  Problem List Items Addressed This Visit     None      Visit Diagnoses     Lumbar radiculopathy    -  Primary                 Diagnoses and all orders for this visit:    Lumbar radiculopathy              Subjective:      Patient ID: Trev Mabry is a 54 y o  male  CC:    Chief Complaint   Patient presents with    Follow-up    Results       HPI:    HPI:  This is a 59-year-old gentleman who presents for follow-up of low back pain with acute on chronic episode  He was treated with prednisone and muscle relaxer  He reports that he is feeling much better after his acute episode  He does exercise regularly and stretching routine, but had been lax during the month of December because his gym was closed  He would like to review in more detail the results of his back to see what he can do to help prevent further episodes in the future  He would also like to discuss if further treatment is necessary at this time  He is not currently having any radiculopathy  The following portions of the patient's history were reviewed and updated as appropriate: allergies, current medications, past family history, past medical history, past social history, past surgical history and problem list       Review of Systems   Constitutional: Negative for fever  HENT: Negative for congestion  Respiratory: Negative for cough, chest tightness and shortness of breath  Cardiovascular: Negative for chest pain  Musculoskeletal: Positive for arthralgias, back pain and myalgias  Data to review:       Objective:    Vitals:    02/05/21 0918   BP: 122/80   BP Location: Left arm   Patient Position: Sitting   Pulse: 64   Weight: 84 6 kg (186 lb 6 4 oz)   Height: 5' 10" (1 778 m)        Physical Exam  Constitutional:       General: He is not in acute distress  Appearance: He is well-developed  HENT:      Head: Normocephalic and atraumatic  Right Ear: Tympanic membrane normal       Left Ear: Tympanic membrane normal    Eyes:      Conjunctiva/sclera: Conjunctivae normal    Neck:      Musculoskeletal: Normal range of motion  Cardiovascular:      Rate and Rhythm: Normal rate and regular rhythm  Pulmonary:      Effort: Pulmonary effort is normal    Abdominal:      General: Abdomen is flat  Bowel sounds are normal  There is no distension  Palpations: Abdomen is soft  There is no mass  Musculoskeletal: Normal range of motion  Skin:     General: Skin is warm  Findings: No rash  Neurological:      Mental Status: He is alert and oriented to person, place, and time     Psychiatric:         Mood and Affect: Mood normal

## 2021-02-05 NOTE — PATIENT INSTRUCTIONS
Assessment/plan:  1  Lumbar radiculopathy-symptoms seem to be consistent with a right-sided L5-S1 radiculopathy  Currently his symptoms are improved after prednisone and muscle relaxer  He does have some foraminal narrowing and disc herniation at the L5-S1 level which could be problematic in the future  He was encouraged to consider physical therapy and he continues regular exercises and stretching and core strengthening at home  If in the future he has more significant exacerbation that is not alleviated with anti-inflammatories we will consider evaluation by pain management for injection therapies if needed

## 2021-09-09 ENCOUNTER — OFFICE VISIT (OUTPATIENT)
Dept: FAMILY MEDICINE CLINIC | Facility: CLINIC | Age: 56
End: 2021-09-09
Payer: COMMERCIAL

## 2021-09-09 ENCOUNTER — APPOINTMENT (OUTPATIENT)
Dept: RADIOLOGY | Facility: MEDICAL CENTER | Age: 56
End: 2021-09-09
Payer: COMMERCIAL

## 2021-09-09 ENCOUNTER — TELEPHONE (OUTPATIENT)
Dept: FAMILY MEDICINE CLINIC | Facility: CLINIC | Age: 56
End: 2021-09-09

## 2021-09-09 VITALS
TEMPERATURE: 98.3 F | BODY MASS INDEX: 25.91 KG/M2 | WEIGHT: 181 LBS | HEIGHT: 70 IN | SYSTOLIC BLOOD PRESSURE: 144 MMHG | HEART RATE: 64 BPM | DIASTOLIC BLOOD PRESSURE: 90 MMHG

## 2021-09-09 DIAGNOSIS — M79.671 RIGHT FOOT PAIN: ICD-10-CM

## 2021-09-09 DIAGNOSIS — M79.671 RIGHT FOOT PAIN: Primary | ICD-10-CM

## 2021-09-09 PROCEDURE — 99213 OFFICE O/P EST LOW 20 MIN: CPT | Performed by: PHYSICIAN ASSISTANT

## 2021-09-09 PROCEDURE — 1036F TOBACCO NON-USER: CPT | Performed by: PHYSICIAN ASSISTANT

## 2021-09-09 PROCEDURE — 73630 X-RAY EXAM OF FOOT: CPT

## 2021-09-09 PROCEDURE — 3725F SCREEN DEPRESSION PERFORMED: CPT | Performed by: PHYSICIAN ASSISTANT

## 2021-09-09 PROCEDURE — 3008F BODY MASS INDEX DOCD: CPT | Performed by: PHYSICIAN ASSISTANT

## 2021-09-09 NOTE — PROGRESS NOTES
Assessment and Plan:  Patient Instructions     Assessment/plan:  1  Right foot pain-this seems to be local tenderness at the mid 5th metatarsal-recommend ruling out stress fracture  Alert will order x-ray  Recommend consult by Podiatry  Encourage icing after activity  Limit high impact exercise  Problem List Items Addressed This Visit     None      Visit Diagnoses     Right foot pain    -  Primary    Relevant Orders    XR foot 3+ vw right    Ambulatory referral to Podiatry                 Diagnoses and all orders for this visit:    Right foot pain  -     XR foot 3+ vw right; Future  -     Ambulatory referral to Podiatry; Future              Subjective:      Patient ID: Paula Mendez is a 54 y o  male  CC:    Chief Complaint   Patient presents with    Foot Pain     Pain bottom of right foot  - ongoing for months  Pain comes and goes but is generally worse after a workout  No specific injury  - [facility]       HPI:      HPI:  This is a 68-year-old gentleman that presents to the office with concerns over right foot pain that has been going on for a month or more  He does not recall any specific moment that it began in does not have any history of injury  He does a lot of competitive running and sprinting 2-3 mi range  He states that he has been trying to rest it and stay off of it and despite this it just comes back as soon as he starts doing activity  He feels at this point needs to be addressed and wanted to get evaluated  The following portions of the patient's history were reviewed and updated as appropriate: allergies, current medications, past family history, past medical history, past social history, past surgical history and problem list       Review of Systems   Constitutional: Negative for chills, fatigue and fever  HENT: Negative for congestion, ear pain and sinus pressure  Eyes: Negative for visual disturbance     Respiratory: Negative for cough, chest tightness and shortness of breath  Cardiovascular: Negative for chest pain and palpitations  Gastrointestinal: Negative for diarrhea, nausea and vomiting  Endocrine: Negative for polyuria  Genitourinary: Negative for dysuria and frequency  Musculoskeletal: Negative for arthralgias and myalgias  Skin: Negative for pallor and rash  Neurological: Negative for dizziness, weakness, light-headedness, numbness and headaches  Psychiatric/Behavioral: Negative for agitation, behavioral problems and sleep disturbance  All other systems reviewed and are negative  Data to review:       Objective:    Vitals:    09/09/21 0834   BP: 144/90   BP Location: Left arm   Patient Position: Sitting   Cuff Size: Large   Pulse: 64   Temp: 98 3 °F (36 8 °C)   TempSrc: Oral   Weight: 82 1 kg (181 lb)   Height: 5' 10" (1 778 m)        Physical Exam  Constitutional:       General: He is not in acute distress  Appearance: He is well-developed  HENT:      Head: Normocephalic and atraumatic  Right Ear: Tympanic membrane normal       Left Ear: Tympanic membrane normal    Eyes:      Conjunctiva/sclera: Conjunctivae normal    Cardiovascular:      Rate and Rhythm: Normal rate and regular rhythm  Pulmonary:      Effort: Pulmonary effort is normal    Abdominal:      General: Abdomen is flat  Bowel sounds are normal  There is no distension  Palpations: Abdomen is soft  There is no mass  Musculoskeletal:         General: Normal range of motion  Cervical back: Normal range of motion  Comments:  Patient does have point tenderness at the right mid 5th metatarsal of the right foot  Skin:     General: Skin is warm  Findings: No rash  Neurological:      Mental Status: He is alert and oriented to person, place, and time     Psychiatric:         Mood and Affect: Mood normal

## 2021-09-09 NOTE — PATIENT INSTRUCTIONS
Assessment/plan:  1  Right foot pain-this seems to be local tenderness at the mid 5th metatarsal-recommend ruling out stress fracture  Alert will order x-ray  Recommend consult by Podiatry  Encourage icing after activity  Limit high impact exercise

## 2021-09-09 NOTE — TELEPHONE ENCOUNTER
I called Dr Rabia Caceres office to set pt up for an appointment  No one was available to take the call so I LMOM asking them to please call pt to schedule an appointment  I advised pt to call our office back if he does not hear from Dr Jewel Prince office today

## 2021-11-15 DIAGNOSIS — E03.9 HYPOTHYROIDISM, UNSPECIFIED TYPE: ICD-10-CM

## 2021-11-15 RX ORDER — LEVOTHYROXINE SODIUM 0.03 MG/1
TABLET ORAL
Qty: 90 TABLET | Refills: 3 | Status: SHIPPED | OUTPATIENT
Start: 2021-11-15

## 2021-12-03 DIAGNOSIS — E78.2 MIXED HYPERLIPIDEMIA: ICD-10-CM

## 2021-12-03 RX ORDER — FENOFIBRATE 160 MG/1
TABLET ORAL
Qty: 90 TABLET | Refills: 3 | Status: SHIPPED | OUTPATIENT
Start: 2021-12-03

## 2021-12-14 ENCOUNTER — OFFICE VISIT (OUTPATIENT)
Dept: FAMILY MEDICINE CLINIC | Facility: CLINIC | Age: 56
End: 2021-12-14
Payer: COMMERCIAL

## 2021-12-14 VITALS
HEART RATE: 72 BPM | TEMPERATURE: 96 F | DIASTOLIC BLOOD PRESSURE: 64 MMHG | HEIGHT: 70 IN | WEIGHT: 181 LBS | SYSTOLIC BLOOD PRESSURE: 118 MMHG | BODY MASS INDEX: 25.91 KG/M2

## 2021-12-14 DIAGNOSIS — E78.5 HYPERLIPIDEMIA, UNSPECIFIED HYPERLIPIDEMIA TYPE: ICD-10-CM

## 2021-12-14 DIAGNOSIS — E03.9 HYPOTHYROIDISM, UNSPECIFIED TYPE: ICD-10-CM

## 2021-12-14 DIAGNOSIS — H01.002 BLEPHARITIS OF RIGHT LOWER EYELID, UNSPECIFIED TYPE: Primary | ICD-10-CM

## 2021-12-14 PROCEDURE — 1036F TOBACCO NON-USER: CPT | Performed by: PHYSICIAN ASSISTANT

## 2021-12-14 PROCEDURE — 3008F BODY MASS INDEX DOCD: CPT | Performed by: PHYSICIAN ASSISTANT

## 2021-12-14 PROCEDURE — 99214 OFFICE O/P EST MOD 30 MIN: CPT | Performed by: PHYSICIAN ASSISTANT

## 2021-12-27 DIAGNOSIS — F52.21 ERECTILE DYSFUNCTION OF NON-ORGANIC ORIGIN: ICD-10-CM

## 2021-12-27 RX ORDER — TADALAFIL 5 MG/1
TABLET ORAL
Qty: 30 TABLET | Refills: 5 | Status: SHIPPED | OUTPATIENT
Start: 2021-12-27

## 2022-01-15 ENCOUNTER — TELEMEDICINE (OUTPATIENT)
Dept: FAMILY MEDICINE CLINIC | Facility: CLINIC | Age: 57
End: 2022-01-15
Payer: COMMERCIAL

## 2022-01-15 DIAGNOSIS — U07.1 COVID-19 VIRUS INFECTION: Primary | ICD-10-CM

## 2022-01-15 DIAGNOSIS — R05.3 CHRONIC COUGH: ICD-10-CM

## 2022-01-15 PROCEDURE — 1036F TOBACCO NON-USER: CPT | Performed by: FAMILY MEDICINE

## 2022-01-15 PROCEDURE — 99213 OFFICE O/P EST LOW 20 MIN: CPT | Performed by: FAMILY MEDICINE

## 2022-01-15 RX ORDER — BENZONATATE 200 MG/1
200 CAPSULE ORAL 3 TIMES DAILY PRN
Qty: 20 CAPSULE | Refills: 0 | Status: SHIPPED | OUTPATIENT
Start: 2022-01-15

## 2022-01-15 RX ORDER — AZITHROMYCIN 250 MG/1
TABLET, FILM COATED ORAL
Qty: 6 TABLET | Refills: 0 | Status: SHIPPED | OUTPATIENT
Start: 2022-01-15 | End: 2022-01-20

## 2022-01-15 RX ORDER — ALBUTEROL SULFATE 90 UG/1
2 AEROSOL, METERED RESPIRATORY (INHALATION) EVERY 6 HOURS PRN
Qty: 6.7 G | Refills: 0 | Status: SHIPPED | OUTPATIENT
Start: 2022-01-15

## 2022-01-15 NOTE — ASSESSMENT & PLAN NOTE
As far as COVID, the patient has minimal symptoms at this point  The cough is the only concern, and that predates COVID  Has been going on for least 3 weeks now with the cough  Again, COVID appears to be resolved at this point  Should be able to return to normal activities as of Monday  Vaccination: Recommend obtain after 2 months

## 2022-01-15 NOTE — PATIENT INSTRUCTIONS
Problem List Items Addressed This Visit     Chronic cough     Patient does appear to have a chronic cough  Based on that, I would recommend that we start on antibiotics such as Zithromax, as well as use inhalers  He can also get a cough suppressant if needed  Follow-up in approximately 2 weeks if needed  At some point, consider further evaluation such as chest x-ray, though it does sound more allergy related than anything else  Relevant Medications    azithromycin (ZITHROMAX) 250 mg tablet    albuterol (Proventil HFA) 90 mcg/act inhaler    benzonatate (TESSALON) 200 MG capsule    COVID-19 virus infection - Primary     As far as COVID, the patient has minimal symptoms at this point  The cough is the only concern, and that predates COVID  Has been going on for least 3 weeks now with the cough  Again, COVID appears to be resolved at this point  Should be able to return to normal activities as of Monday  Vaccination: Recommend obtain after 2 months  COVID 19 Instructions    Beena Coffee was advised to limit contact with others to essential tasks such as getting food, medications, and medical care  Proper handwashing reviewed, and Hand sanitzer when washing is not available  If the patient develops symptoms of COVID 19, the patient should call the office as soon as possible  For 2242-9048 Flu season, it is strongly recommended that Flu Vaccinations be obtained  Virtual Visits:  Baron: This works on smart phones (any phone with Internet browsing capability)  You should get a text message when the provider is ready to see you  Click on the link in the text message, and the call should start  You will need to type in your name, and allow camera and microphone access  This is HIPPA compliant, and secure  If you have not already done so, get immunized to COVID 19        We are committed to getting you vaccinated as soon as possible and will be closely following CDC and [facility]  guidelines as they are released and revised  Please refer to our COVID-19 vaccine webpage for the most up to date information on the vaccine and our distribution efforts  This site will also have the most up to date recommendations for COVID booster vaccine  Dolores eldridge    Call [phone] ([phone]), option [phone]    OUR NEW LOCATION:    [address], [facility] [address] , [place], [address]  Fax: [phone]    Lab services and OB/GYN are at this location as well

## 2022-01-15 NOTE — ASSESSMENT & PLAN NOTE
Patient does appear to have a chronic cough  Based on that, I would recommend that we start on antibiotics such as Zithromax, as well as use inhalers  He can also get a cough suppressant if needed  Follow-up in approximately 2 weeks if needed  At some point, consider further evaluation such as chest x-ray, though it does sound more allergy related than anything else

## 2022-01-15 NOTE — PROGRESS NOTES
COVID-19 Outpatient Progress Note    Assessment/Plan:    Problem List Items Addressed This Visit     Chronic cough     Patient does appear to have a chronic cough  Based on that, I would recommend that we start on antibiotics such as Zithromax, as well as use inhalers  He can also get a cough suppressant if needed  Follow-up in approximately 2 weeks if needed  At some point, consider further evaluation such as chest x-ray, though it does sound more allergy related than anything else  Relevant Medications    azithromycin (ZITHROMAX) 250 mg tablet    albuterol (Proventil HFA) 90 mcg/act inhaler    benzonatate (TESSALON) 200 MG capsule    COVID-19 virus infection - Primary     As far as COVID, the patient has minimal symptoms at this point  The cough is the only concern, and that predates COVID  Has been going on for least 3 weeks now with the cough  Again, COVID appears to be resolved at this point  Should be able to return to normal activities as of Monday  Vaccination: Recommend obtain after 2 months  Disposition:     I have spent 15 minutes directly with the patient  Encounter provider Victoria Kramer MD    Provider located at [address] [place] [id number] [phone]    Recent Visits  No visits were found meeting these conditions  Showing recent visits within past 7 days and meeting all other requirements  Today's Visits  Date Type Provider Dept   01/15/22 Telemedicine Victoria Kramer MD [facility]   Showing today's visits and meeting all other requirements  Future Appointments  No visits were found meeting these conditions  Showing future appointments within next 150 days and meeting all other requirements     This virtual check-in was done via [facility] and patient was informed that this is a secure, HIPAA-compliant platform  He agrees to proceed      Patient agrees to participate in a virtual check in via telephone or video visit instead of presenting to the office to address urgent/immediate medical needs  Patient is aware this is a billable service  After connecting through [facility], the patient was identified by name and date of birth  Herman Jasso was informed that this was a telemedicine visit and that the exam was being conducted confidentially over secure lines  My office door was closed  No one else was in the room  Herman Jasso acknowledged consent and understanding of privacy and security of the telemedicine visit  I informed the patient that I have reviewed his record in Epic and presented the opportunity for him to ask any questions regarding the visit today  The patient agreed to participate  Verification of patient location:  Patient is located in the following state in which I hold an active license: [place]    Subjective:   Herman Jasso is a 64 y o  male who has been screened for COVID-19  Symptom change since last report: improving  Patient's symptoms include cough  Patient denies fever, chills, fatigue, malaise, congestion, rhinorrhea, sore throat, anosmia, loss of taste, shortness of breath, chest tightness, abdominal pain, nausea, vomiting, diarrhea, myalgias and headaches  - Date of symptom onset: 1/5/2022  - Date of positive COVID-19 test: 1/9/2022  Type of test: PCR  COVID-19 vaccination status: Not vaccinated    Traciewatson Gerard has been staying home and has isolated themselves in his home  He is taking care to not share personal items and is cleaning all surfaces that are touched often, like counters, tabletops, and doorknobs using household cleaning sprays or wipes  He is wearing a mask when he leaves his room  Cough for 3 weeks  Last Sunday, felt terrible with this  Had fever  Has now no fever  Just coughing  Gets yearly  Lingers  OTC's dont really help      Lab Results   Component Value Date    SARSCOV2 Not Detected 03/28/2020 JESSYABBY Detected (A) 01/09/2022     Past Medical History:   Diagnosis Date    Disease of thyroid gland     Shoulder pain     left shoulder pain    Tinnitus      Past Surgical History:   Procedure Laterality Date    COLONOSCOPY      KNEE ARTHROSCOPY Left     MI SHLDR ARTHROSCOP,SURG,W/ROTAT CUFF REPR Left 12/12/2019    Procedure: REPAIR ROTATOR CUFF  ARTHROSCOPIC;  Surgeon: Marley Lr MD;  [facility];  Service: Orthopedics    WISDOM TOOTH EXTRACTION       Current Outpatient Medications   Medication Sig Dispense Refill    albuterol (Proventil HFA) 90 mcg/act inhaler Inhale 2 puffs every 6 (six) hours as needed for wheezing 6 7 g 0    azithromycin (ZITHROMAX) 250 mg tablet Take 2 tablets on day 1, then 1 tablet daily days 2 through 5 6 tablet 0    benzonatate (TESSALON) 200 MG capsule Take 1 capsule (200 mg total) by mouth 3 (three) times a day as needed for cough 20 capsule 0    fenofibrate 160 MG tablet TAKE 1 TABLET DAILY 90 tablet 3    levothyroxine 25 mcg tablet TAKE 1 TABLET DAILY 90 tablet 3    Neomycin-Polymyxin-Dexameth 0 1 % OINT Apply to eye 2 (two) times a day 3 5 g 0    tadalafil (CIALIS) 5 MG tablet take 1 tablet by mouth once daily 30 tablet 5     No current facility-administered medications for this visit  Allergies   Allergen Reactions    Simvastatin Myalgia       Review of Systems   Constitutional: Negative for chills, fatigue and fever  HENT: Negative for congestion, rhinorrhea and sore throat  Respiratory: Positive for cough  Negative for chest tightness and shortness of breath  Gastrointestinal: Negative for abdominal pain, diarrhea, nausea and vomiting  Musculoskeletal: Negative for myalgias  Neurological: Negative for headaches  Objective: There were no vitals filed for this visit  Physical Exam    VIRTUAL VISIT DISCLAIMER    Javier Teixeira verbally agrees to participate in [employer]   Pt is aware that [facility] could be limited without vital signs or the ability to perform a full hands-on physical exam  Danilo De Dios understands he or the provider may request at any time to terminate the video visit and request the patient to seek care or treatment in person

## 2022-05-26 ENCOUNTER — OFFICE VISIT (OUTPATIENT)
Dept: FAMILY MEDICINE CLINIC | Facility: CLINIC | Age: 57
End: 2022-05-26
Payer: COMMERCIAL

## 2022-05-26 VITALS
DIASTOLIC BLOOD PRESSURE: 80 MMHG | SYSTOLIC BLOOD PRESSURE: 122 MMHG | OXYGEN SATURATION: 99 % | TEMPERATURE: 97.9 F | RESPIRATION RATE: 16 BRPM | HEART RATE: 97 BPM | BODY MASS INDEX: 26.34 KG/M2 | HEIGHT: 70 IN | WEIGHT: 184 LBS

## 2022-05-26 DIAGNOSIS — U07.1 COVID-19 VIRUS INFECTION: ICD-10-CM

## 2022-05-26 DIAGNOSIS — R05.9 COUGH: Primary | ICD-10-CM

## 2022-05-26 DIAGNOSIS — J30.1 ALLERGIC RHINITIS DUE TO POLLEN, UNSPECIFIED SEASONALITY: ICD-10-CM

## 2022-05-26 DIAGNOSIS — R50.9 FEVER, UNSPECIFIED FEVER CAUSE: ICD-10-CM

## 2022-05-26 DIAGNOSIS — R09.81 NASAL CONGESTION: ICD-10-CM

## 2022-05-26 DIAGNOSIS — J20.0 ACUTE BRONCHITIS DUE TO MYCOPLASMA PNEUMONIAE: ICD-10-CM

## 2022-05-26 PROCEDURE — 3725F SCREEN DEPRESSION PERFORMED: CPT | Performed by: FAMILY MEDICINE

## 2022-05-26 PROCEDURE — 3008F BODY MASS INDEX DOCD: CPT | Performed by: FAMILY MEDICINE

## 2022-05-26 PROCEDURE — 1036F TOBACCO NON-USER: CPT | Performed by: FAMILY MEDICINE

## 2022-05-26 PROCEDURE — 87636 SARSCOV2 & INF A&B AMP PRB: CPT | Performed by: FAMILY MEDICINE

## 2022-05-26 PROCEDURE — 99214 OFFICE O/P EST MOD 30 MIN: CPT | Performed by: FAMILY MEDICINE

## 2022-05-26 RX ORDER — PROMETHAZINE HYDROCHLORIDE 6.25 MG/5ML
SYRUP ORAL
Qty: 120 ML | Refills: 1 | Status: SHIPPED | OUTPATIENT
Start: 2022-05-26

## 2022-05-26 RX ORDER — AZITHROMYCIN 250 MG/1
TABLET, FILM COATED ORAL
Qty: 6 TABLET | Refills: 0 | Status: SHIPPED | OUTPATIENT
Start: 2022-05-26 | End: 2022-05-31

## 2022-05-26 NOTE — PATIENT INSTRUCTIONS
Finish antibiotic  Use promethazine for head congestion runny nose or cough    Will cause drowsiness do not drive or operate machinery until side effect is known  Increase p o  fluids   Return in 1 week if still symptoms  If worsen over we can report to emergency department at [facility]

## 2022-05-26 NOTE — PROGRESS NOTES
Assessment and Plan:  1  Cough  2  Fever  3  Nasal congestion  4  Status post COVID 1/22, no immunizations  Flu/COVID swab sent to lab  5  Acute bronchitis favor mycoplasma, Zithromax prescribed  6  Cough  Promethazine ordered drowsiness discussed   7  Per allergic rhinitis, promethazine ordered   8  Patient return in 1 week if still symptoms  If worsen report emergency department  Problem List Items Addressed This Visit        Respiratory    Allergic rhinitis     Promethazine ordered           Relevant Medications    promethazine (PHENERGAN) 12 5 mg/10 mL syrup       Other    RESOLVED: COVID-19 virus infection     1/22             Other Visit Diagnoses     Cough    -  Primary    Relevant Medications    promethazine (PHENERGAN) 12 5 mg/10 mL syrup    Other Relevant Orders    Covid/Flu- Office Collect    Fever, unspecified fever cause        Nasal congestion        Relevant Medications    promethazine (PHENERGAN) 12 5 mg/10 mL syrup    Acute bronchitis due to Mycoplasma pneumoniae        Relevant Medications    promethazine (PHENERGAN) 12 5 mg/10 mL syrup    azithromycin (ZITHROMAX) 250 mg tablet                 Diagnoses and all orders for this visit:    Cough  -     Covid/Flu- Office Collect  -     promethazine (PHENERGAN) 12 5 mg/10 mL syrup; Take 1 tsp 4 times daily and 2 tsp at bedtime as needed for cough    Fever, unspecified fever cause    Nasal congestion  -     promethazine (PHENERGAN) 12 5 mg/10 mL syrup; Take 1 tsp 4 times daily and 2 tsp at bedtime as needed for cough    COVID-19 virus infection    Allergic rhinitis due to pollen, unspecified seasonality  -     promethazine (PHENERGAN) 12 5 mg/10 mL syrup; Take 1 tsp 4 times daily and 2 tsp at bedtime as needed for cough    Acute bronchitis due to Mycoplasma pneumoniae  -     azithromycin (ZITHROMAX) 250 mg tablet; Take 2 tablets today then 1 tablet daily till finished            Subjective:      Patient ID: Sanjuana Read is a 64 y o  male     CC:    Chief Complaint   Patient presents with    Cough     Pt here with cough x 1 week, pt took at home covid test and it was negative  Rcruz       HPI:    Last Wednesday patient started with a mild sore throat low-grade fever  On Sunday did have a fever 102  Patient did a COVID test on Sunday x2 both were negative  Patient now with mild-to-moderate productive cough with thick green sputum  No chest pain shortness but wheeze or hemoptysis  As of now no medication taken      The following portions of the patient's history were reviewed and updated as appropriate: allergies, current medications, past family history, past medical history, past social history, past surgical history and problem list       Review of Systems   Constitutional:        HPI   HENT:        HPI   Eyes: Negative  Respiratory:        HPI   Cardiovascular: Negative  Gastrointestinal: Negative  Endocrine: Negative  Genitourinary: Negative  Musculoskeletal: Negative  Skin: Negative  Allergic/Immunologic: Negative  Neurological: Negative  Hematological: Negative  Psychiatric/Behavioral: Negative  Data to review:       Objective:    Vitals:    05/26/22 1250   BP: 122/80   BP Location: Right arm   Patient Position: Sitting   Cuff Size: Standard   Pulse: 97   Resp: 16   Temp: 97 9 °F (36 6 °C)   TempSrc: Temporal   SpO2: 99%   Weight: 83 5 kg (184 lb)   Height: 5' 10" (1 778 m)        Physical Exam  Vitals and nursing note reviewed  Constitutional:       Appearance: Normal appearance  HENT:      Head: Normocephalic and atraumatic  Right Ear: Tympanic membrane normal       Left Ear: Tympanic membrane normal       Nose:      Comments: Positive boggy turbinates     Mouth/Throat:      Mouth: Mucous membranes are moist       Pharynx: Oropharynx is clear  No oropharyngeal exudate or posterior oropharyngeal erythema  Eyes:      General: No scleral icterus  Right eye: No discharge  Left eye: No discharge  Cardiovascular:      Rate and Rhythm: Normal rate and regular rhythm  Heart sounds: Normal heart sounds  Pulmonary:      Effort: Pulmonary effort is normal  No respiratory distress  Breath sounds: No stridor  No wheezing, rhonchi or rales  Comments: Very mildly coarse breath sounds  Chest:      Chest wall: No tenderness  Musculoskeletal:      Cervical back: Neck supple  Lymphadenopathy:      Cervical: No cervical adenopathy  Skin:     General: Skin is warm and dry  Neurological:      General: No focal deficit present  Mental Status: He is alert  Psychiatric:         Mood and Affect: Mood normal            BMI Counseling: Body mass index is 26 4 kg/m²  The BMI is above normal  Nutrition recommendations include moderation in carbohydrate intake and reducing intake of cholesterol  Exercise recommendations include exercising 3-5 times per week  Rationale for BMI follow-up plan is due to patient being overweight or obese  Depression Screening and Follow-up Plan: Patient was screened for depression during today's encounter  They screened negative with a PHQ-2 score of 0

## 2022-05-27 LAB
FLUAV RNA RESP QL NAA+PROBE: NEGATIVE
FLUBV RNA RESP QL NAA+PROBE: NEGATIVE
SARS-COV-2 RNA RESP QL NAA+PROBE: NEGATIVE

## 2022-09-16 ENCOUNTER — OFFICE VISIT (OUTPATIENT)
Dept: FAMILY MEDICINE CLINIC | Facility: CLINIC | Age: 57
End: 2022-09-16
Payer: COMMERCIAL

## 2022-09-16 VITALS
TEMPERATURE: 97.4 F | BODY MASS INDEX: 25.48 KG/M2 | HEART RATE: 74 BPM | WEIGHT: 178 LBS | SYSTOLIC BLOOD PRESSURE: 138 MMHG | HEIGHT: 70 IN | DIASTOLIC BLOOD PRESSURE: 74 MMHG

## 2022-09-16 DIAGNOSIS — E78.5 HYPERLIPIDEMIA, UNSPECIFIED HYPERLIPIDEMIA TYPE: ICD-10-CM

## 2022-09-16 DIAGNOSIS — R05.3 CHRONIC COUGH: ICD-10-CM

## 2022-09-16 DIAGNOSIS — J40 BRONCHITIS: ICD-10-CM

## 2022-09-16 DIAGNOSIS — R05.9 COUGH: Primary | ICD-10-CM

## 2022-09-16 DIAGNOSIS — R05.9 COUGH: ICD-10-CM

## 2022-09-16 DIAGNOSIS — E03.9 HYPOTHYROIDISM, UNSPECIFIED TYPE: ICD-10-CM

## 2022-09-16 PROCEDURE — 99214 OFFICE O/P EST MOD 30 MIN: CPT | Performed by: PHYSICIAN ASSISTANT

## 2022-09-16 RX ORDER — PROMETHAZINE HYDROCHLORIDE AND CODEINE PHOSPHATE 6.25; 1 MG/5ML; MG/5ML
SYRUP ORAL
Qty: 120 ML | Refills: 0 | Status: SHIPPED | OUTPATIENT
Start: 2022-09-16 | End: 2022-10-06

## 2022-09-16 RX ORDER — PROMETHAZINE HYDROCHLORIDE AND CODEINE PHOSPHATE 6.25; 1 MG/5ML; MG/5ML
SYRUP ORAL
Qty: 120 ML | Refills: 0 | Status: SHIPPED | OUTPATIENT
Start: 2022-09-16 | End: 2022-09-16 | Stop reason: SDUPTHER

## 2022-09-16 RX ORDER — ALBUTEROL SULFATE 90 UG/1
2 AEROSOL, METERED RESPIRATORY (INHALATION) EVERY 6 HOURS PRN
Qty: 6.7 G | Refills: 0 | Status: SHIPPED | OUTPATIENT
Start: 2022-09-16

## 2022-09-16 RX ORDER — AZITHROMYCIN 250 MG/1
TABLET, FILM COATED ORAL
Qty: 6 TABLET | Refills: 0 | Status: SHIPPED | OUTPATIENT
Start: 2022-09-16 | End: 2022-09-21

## 2022-09-16 NOTE — PATIENT INSTRUCTIONS
Assessment/plan:   Acute bronchitis -recommend Zithromax Z-Aamir as directed  Patient also given promethazine with codeine cough syrup, 2 tsp at bedtime  He is cautioned of drowsiness with this medication  He may use albuterol inhaler, 2 puffs every 4-6 hours as needed for cough or wheezing during the daytime  Recommend follow up if symptoms are not improving in the next week or sooner if symptoms worsen  Patient verbalizes understanding and agreement with plan  2  Hypothyroidism -stable, no medication changes  3   Hyperlipidemia -stable, no medication changes

## 2022-09-16 NOTE — PROGRESS NOTES
Assessment and Plan:  Patient Instructions     Assessment/plan:   Acute bronchitis -recommend Zithromax Z-Aamir as directed  Patient also given promethazine with codeine cough syrup, 2 tsp at bedtime  He is cautioned of drowsiness with this medication  He may use albuterol inhaler, 2 puffs every 4-6 hours as needed for cough or wheezing during the daytime  Recommend follow up if symptoms are not improving in the next week or sooner if symptoms worsen  Patient verbalizes understanding and agreement with plan  2  Hypothyroidism -stable, no medication changes  3   Hyperlipidemia -stable, no medication changes  Problem List Items Addressed This Visit        Endocrine    Hypothyroidism       Other    Hyperlipidemia    Chronic cough    Relevant Medications    albuterol (Proventil HFA) 90 mcg/act inhaler      Other Visit Diagnoses     Cough    -  Primary    Relevant Medications    promethazine-codeine (PHENERGAN WITH CODEINE) 6 25-10 mg/5 mL syrup    Bronchitis        Relevant Medications    azithromycin (Zithromax) 250 mg tablet    promethazine-codeine (PHENERGAN WITH CODEINE) 6 25-10 mg/5 mL syrup    albuterol (Proventil HFA) 90 mcg/act inhaler                 Diagnoses and all orders for this visit:    Cough  -     promethazine-codeine (PHENERGAN WITH CODEINE) 6 25-10 mg/5 mL syrup; Take 2 tsp at bedtime as necessary for cough    Chronic cough  -     albuterol (Proventil HFA) 90 mcg/act inhaler; Inhale 2 puffs every 6 (six) hours as needed for wheezing    Bronchitis  -     azithromycin (Zithromax) 250 mg tablet; Take 2 tablets (500 mg total) by mouth daily for 1 day, THEN 1 tablet (250 mg total) daily for 4 days  Hypothyroidism, unspecified type    Hyperlipidemia, unspecified hyperlipidemia type            Subjective:      Patient ID: Beena Marsh is a 64 y o  male      CC:    Chief Complaint   Patient presents with    Cough     Pt c/o coughing aggressively for the past two weeks >Did take at home covid test which was neg on Sunday  Pt States it started with a sore throat last weekend  Pt is not vacc       HPI:    HPI:  This is a 61-year-old gentleman that presents to the office with almost 2 weeks of ongoing cough and chest congestion which seems to be getting worse  He feels that when he gets these infections it goes right for his chest   He has been getting episodes which it is hard to stop coughing  Sometimes these are triggered by talking or physical exertion  He has had some productive mucus at times  He did have home COVID test which were negative  He has been using some over-the-counter Mucinex which seems to help a little bit with the coughing but he is still having difficulty with sleep at night  The following portions of the patient's history were reviewed and updated as appropriate: allergies, current medications, past family history, past medical history, past social history, past surgical history and problem list       Review of Systems   Constitutional: Positive for fatigue  Negative for chills and fever  HENT: Positive for postnasal drip  Negative for congestion, ear pain and sinus pressure  Eyes: Negative for visual disturbance  Respiratory: Positive for cough  Negative for chest tightness and shortness of breath  Cardiovascular: Negative for chest pain and palpitations  Gastrointestinal: Negative for diarrhea, nausea and vomiting  Endocrine: Negative for polyuria  Genitourinary: Negative for dysuria and frequency  Musculoskeletal: Negative for arthralgias and myalgias  Skin: Negative for pallor and rash  Neurological: Negative for dizziness, weakness, light-headedness, numbness and headaches  Psychiatric/Behavioral: Positive for sleep disturbance  Negative for agitation and behavioral problems  All other systems reviewed and are negative          Data to review:       Objective:    Vitals:    09/16/22 1337   BP: 138/74   BP Location: Right arm   Patient Position: Sitting   Cuff Size: Adult   Pulse: 74   Temp: (!) 97 4 °F (36 3 °C)   TempSrc: Temporal   Weight: 80 7 kg (178 lb)   Height: 5' 10" (1 778 m)        Physical Exam  Constitutional:       General: He is not in acute distress  Appearance: He is well-developed  HENT:      Head: Normocephalic and atraumatic  Right Ear: Tympanic membrane normal       Left Ear: Tympanic membrane normal    Eyes:      Conjunctiva/sclera: Conjunctivae normal    Cardiovascular:      Rate and Rhythm: Normal rate and regular rhythm  Pulmonary:      Comments:   Coarse breath sounds bilaterally  No wheezes or rhonchi or rales  Abdominal:      General: Abdomen is flat  Bowel sounds are normal  There is no distension  Palpations: Abdomen is soft  There is no mass  Musculoskeletal:         General: Normal range of motion  Cervical back: Normal range of motion  Skin:     General: Skin is warm  Findings: No rash  Neurological:      Mental Status: He is alert and oriented to person, place, and time     Psychiatric:         Mood and Affect: Mood normal

## 2022-09-17 NOTE — TELEPHONE ENCOUNTER
Patient called this morning following up on Rx to be sent to [facility]  Spoke to pharmacy medication is ready for , pt notified

## 2022-10-05 ENCOUNTER — NURSE TRIAGE (OUTPATIENT)
Dept: OTHER | Facility: OTHER | Age: 57
End: 2022-10-05

## 2022-10-05 NOTE — TELEPHONE ENCOUNTER
Patient reports a cough with green sputum and burning in his lungs  He is out of town until later tonight and would like a call back to advise  Reason for Disposition   Cough with cold symptoms (e g , runny nose, postnasal drip, throat clearing)    Answer Assessment - Initial Assessment Questions  1  ONSET: "When did the cough begin?"       3 weeks go, 10/3/22  2  SEVERITY: "How bad is the cough today?"       Moderate-severe  3  SPUTUM: "Describe the color of your sputum" (none, dry cough; clear, white, yellow, green)      Green  4  HEMOPTYSIS: "Are you coughing up any blood?" If so ask: "How much?" (flecks, streaks, tablespoons, etc )      Denies   5  DIFFICULTY BREATHING: "Are you having difficulty breathing?" If Yes, ask: "How bad is it?" (e g , mild, moderate, severe)     - MILD: No SOB at rest, mild SOB with walking, speaks normally in sentences, can lay down, no retractions, pulse < 100      - MODERATE: SOB at rest, SOB with minimal exertion and prefers to sit, cannot lie down flat, speaks in phrases, mild retractions, audible wheezing, pulse 100-120      - SEVERE: Very SOB at rest, speaks in single words, struggling to breathe, sitting hunched forward, retractions, pulse > 120       Burning in lungs, congestion   6  FEVER: "Do you have a fever?" If Yes, ask: "What is your temperature, how was it measured, and when did it start?"      Denies   7  CARDIAC HISTORY: "Do you have any history of heart disease?" (e g , heart attack, congestive heart failure)       Denies  8  LUNG HISTORY: "Do you have any history of lung disease?"  (e g , pulmonary embolus, asthma, emphysema)      Denies   9  PE RISK FACTORS: "Do you have a history of blood clots?" (or: recent major surgery, recent prolonged travel, bedridden)      Denies   10  OTHER SYMPTOMS: "Do you have any other symptoms?" (e g , runny nose, wheezing, chest pain)        Denies   11   PREGNANCY: "Is there any chance you are pregnant?" "When was your last menstrual period?"        N/A  12   TRAVEL: "Have you traveled out of the country in the last month?" (e g , travel history, exposures)        Denies    Protocols used: COUGH-ADULT-OH

## 2022-10-05 NOTE — TELEPHONE ENCOUNTER
I can send in a course of prednisone for the inflammation in the chest if he would like  Please provide pharmacy since he is out of town  If it persist please follow-up when he is able to

## 2022-10-05 NOTE — TELEPHONE ENCOUNTER
Regarding: coughing, burning in lungs  ----- Message from José Miguel Alex sent at 10/5/2022 10:09 AM EDT -----  "I have a lot of coughing and burning in my lungs; it is the same thing I had about three weeks ago "

## 2022-10-06 ENCOUNTER — OFFICE VISIT (OUTPATIENT)
Dept: FAMILY MEDICINE CLINIC | Facility: CLINIC | Age: 57
End: 2022-10-06

## 2022-10-06 VITALS
HEART RATE: 101 BPM | BODY MASS INDEX: 27.05 KG/M2 | SYSTOLIC BLOOD PRESSURE: 132 MMHG | WEIGHT: 182.6 LBS | HEIGHT: 69 IN | DIASTOLIC BLOOD PRESSURE: 62 MMHG | RESPIRATION RATE: 16 BRPM | TEMPERATURE: 98.9 F

## 2022-10-06 DIAGNOSIS — E78.5 HYPERLIPIDEMIA, UNSPECIFIED HYPERLIPIDEMIA TYPE: ICD-10-CM

## 2022-10-06 DIAGNOSIS — E03.9 HYPOTHYROIDISM, UNSPECIFIED TYPE: ICD-10-CM

## 2022-10-06 DIAGNOSIS — U07.1 COVID-19: Primary | ICD-10-CM

## 2022-10-06 RX ORDER — NIRMATRELVIR AND RITONAVIR 150-100 MG
2 KIT ORAL 2 TIMES DAILY
Qty: 20 TABLET | Refills: 0 | Status: SHIPPED | OUTPATIENT
Start: 2022-10-06 | End: 2022-10-11

## 2022-10-06 RX ORDER — PROMETHAZINE HYDROCHLORIDE AND CODEINE PHOSPHATE 6.25; 1 MG/5ML; MG/5ML
SYRUP ORAL
Qty: 120 ML | Refills: 0 | Status: SHIPPED | OUTPATIENT
Start: 2022-10-06

## 2022-10-06 RX ORDER — PREDNISONE 10 MG/1
TABLET ORAL
Qty: 21 TABLET | Refills: 0 | Status: SHIPPED | OUTPATIENT
Start: 2022-10-06 | End: 2022-10-12

## 2022-10-06 NOTE — PATIENT INSTRUCTIONS
Assessment/plan:   COVID-19 infection   -Today is day 2  Patient should continue to quarantine through day 5 and then wear a mask for 5 days around others  He will be started on Paxlovid therapy, lower dose since his last GFR was 59  I will send in promethazine with codeine cough syrup at bedtime for him as necessary and Prednisone 10 mg, take 6 tablets at once on day 1, then 5 tablets at once on day 2, then 4 tablets at once on day 3, then 3 tablets at once on day 4, then 2 tablets once on day 5, then 1 tablet on day 6, 21 pills/0Rfls  2  Mixed hyperlipidemia   -Stable on statin therapy, no medication changes  3  Hypothyroidism-presently stable, no medication changes  4   BMI of 27 -patient does have some increased risk with COVID infection

## 2022-10-06 NOTE — PROGRESS NOTES
Name: Jarrod Cortez      : 1965      MRN: [id number]  Encounter Provider: Rayshawn Benítez PA-C  Encounter Date: 10/6/2022   Encounter department: [facility] PRIMARY CARE    Assessment & Plan     1  COVID-19  -     nirmatrelvir & ritonavir (Paxlovid, 150/100,) tablet therapy pack; Take 2 tablets by mouth 2 (two) times a day for 5 days Take 1 nirmatrelvir tablet + 1 ritonavir tablet together per dose  -     predniSONE 10 mg tablet; 6,5,4,3,2,1  -     promethazine-codeine (PHENERGAN WITH CODEINE) 6 25-10 mg/5 mL syrup; Take 2 tsp at bedtime as needed for cough  2  Hyperlipidemia, unspecified hyperlipidemia type    3  Hypothyroidism, unspecified type    4  Body mass index (BMI) of 27 0 to 27 9 in adult         Subjective        HPI:  This is a 70-year-old gentleman that presents to the office with concerns over COVID infection  He tested positive this morning  He is coughing and having a significant tightness in the center of his chest   He has been using inhaler albuterol regularly with a little benefit  He does have comorbidities including hyperlipidemia and body mass index of 27  He does also continue medication for his thyroid  Review of Systems   Constitutional: Positive for fever  Negative for appetite change, chills, diaphoresis and fatigue  HENT: Negative for congestion, ear pain, facial swelling, postnasal drip, rhinorrhea, sinus pressure, sinus pain, sore throat, trouble swallowing and voice change  Respiratory: Positive for cough, chest tightness, shortness of breath and wheezing  Cardiovascular: Positive for chest pain  Gastrointestinal: Negative for abdominal pain, diarrhea, nausea and vomiting  Musculoskeletal: Negative for myalgias  Neurological: Negative for headaches         Current Outpatient Medications on File Prior to Visit   Medication Sig    albuterol (Proventil HFA) 90 mcg/act inhaler Inhale 2 puffs every 6 (six) hours as needed for wheezing    fenofibrate 160 MG tablet TAKE 1 TABLET DAILY    levothyroxine 25 mcg tablet TAKE 1 TABLET DAILY    Neomycin-Polymyxin-Dexameth 0 1 % OINT Apply to eye 2 (two) times a day    tadalafil (CIALIS) 5 MG tablet take 1 tablet by mouth once daily    [DISCONTINUED] promethazine-codeine (PHENERGAN WITH CODEINE) 6 25-10 mg/5 mL syrup Take 2 tsp at bedtime as necessary for cough       Objective     /62 (BP Location: Left arm, Patient Position: Sitting, Cuff Size: Adult)   Pulse 101   Temp 98 9 °F (37 2 °C) (Temporal)   Resp 16   Ht 5' 9" (1 753 m)   Wt 82 8 kg (182 lb 9 6 oz)   BMI 26 97 kg/m²     Physical Exam  Vitals and nursing note reviewed  Constitutional:       General: He is not in acute distress  Appearance: He is well-developed  HENT:      Head: Normocephalic and atraumatic  Right Ear: External ear normal       Left Ear: External ear normal       Nose: Nose normal       Mouth/Throat:      Pharynx: No oropharyngeal exudate  Eyes:      Conjunctiva/sclera: Conjunctivae normal       Pupils: Pupils are equal, round, and reactive to light  Neck:      Thyroid: No thyromegaly  Trachea: No tracheal deviation  Cardiovascular:      Rate and Rhythm: Normal rate and regular rhythm  Heart sounds: Normal heart sounds  No murmur heard  No friction rub  Pulmonary:      Effort: Pulmonary effort is normal  No respiratory distress  Breath sounds: Normal breath sounds  No wheezing or rales  Abdominal:      General: Bowel sounds are normal  There is no distension  Palpations: Abdomen is soft  Tenderness: There is no abdominal tenderness  There is no guarding or rebound  Musculoskeletal:         General: No tenderness  Normal range of motion  Cervical back: Normal range of motion and neck supple  Lymphadenopathy:      Cervical: No cervical adenopathy  Skin:     General: Skin is warm and dry  Findings: No erythema or rash     Neurological:      Mental Status: He is alert and oriented to person, place, and time  Cranial Nerves: No cranial nerve deficit  Coordination: Coordination normal    Psychiatric:         Behavior: Behavior normal          Thought Content:  Thought content normal        Sandra Flores PA-C

## 2022-10-08 DIAGNOSIS — F52.21 ERECTILE DYSFUNCTION OF NON-ORGANIC ORIGIN: ICD-10-CM

## 2022-10-10 RX ORDER — TADALAFIL 5 MG/1
TABLET ORAL
Qty: 30 TABLET | Refills: 5 | Status: SHIPPED | OUTPATIENT
Start: 2022-10-10

## 2022-11-08 DIAGNOSIS — E03.9 HYPOTHYROIDISM, UNSPECIFIED TYPE: ICD-10-CM

## 2022-11-08 RX ORDER — LEVOTHYROXINE SODIUM 0.03 MG/1
TABLET ORAL
Qty: 90 TABLET | Refills: 3 | Status: SHIPPED | OUTPATIENT
Start: 2022-11-08

## 2022-11-20 ENCOUNTER — RA CDI HCC (OUTPATIENT)
Dept: OTHER | Facility: HOSPITAL | Age: 57
End: 2022-11-20

## 2022-11-20 NOTE — PROGRESS NOTES
[facility]  coding opportunities       Chart reviewed, no opportunity found: CHART REVIEWED, NO OPPORTUNITY FOUND        Patients Insurance        Commercial Insurance: [address] [place] [address]

## 2022-11-26 ENCOUNTER — APPOINTMENT (OUTPATIENT)
Dept: RADIOLOGY | Facility: CLINIC | Age: 57
End: 2022-11-26

## 2022-11-26 DIAGNOSIS — M67.471 GANGLION, RIGHT ANKLE AND FOOT: ICD-10-CM

## 2022-11-28 DIAGNOSIS — E78.2 MIXED HYPERLIPIDEMIA: ICD-10-CM

## 2022-11-28 RX ORDER — FENOFIBRATE 160 MG/1
TABLET ORAL
Qty: 90 TABLET | Refills: 3 | Status: SHIPPED | OUTPATIENT
Start: 2022-11-28

## 2022-11-29 ENCOUNTER — CONSULT (OUTPATIENT)
Dept: FAMILY MEDICINE CLINIC | Facility: CLINIC | Age: 57
End: 2022-11-29

## 2022-11-29 VITALS
SYSTOLIC BLOOD PRESSURE: 130 MMHG | HEART RATE: 74 BPM | TEMPERATURE: 99 F | HEIGHT: 70 IN | OXYGEN SATURATION: 100 % | RESPIRATION RATE: 16 BRPM | DIASTOLIC BLOOD PRESSURE: 78 MMHG | BODY MASS INDEX: 25.91 KG/M2 | WEIGHT: 181 LBS

## 2022-11-29 DIAGNOSIS — E03.9 HYPOTHYROIDISM, UNSPECIFIED TYPE: ICD-10-CM

## 2022-11-29 DIAGNOSIS — M79.674 PAIN OF TOE OF RIGHT FOOT: ICD-10-CM

## 2022-11-29 DIAGNOSIS — Z01.818 PRE-OP EXAM: Primary | ICD-10-CM

## 2022-11-29 DIAGNOSIS — E78.5 HYPERLIPIDEMIA, UNSPECIFIED HYPERLIPIDEMIA TYPE: ICD-10-CM

## 2022-11-29 NOTE — PROGRESS NOTES
Name: Mikal Brown      : 1965      MRN: [id number]  Encounter Provider: Roldan Rudd PA-C  Encounter Date: 2022   Encounter department: [facility] PRIMARY CARE    Assessment & Plan     1  Pre-op exam  -     POCT ECG    2  Pain of toe of right foot    3  Hyperlipidemia, unspecified hyperlipidemia type    4  Hypothyroidism, unspecified type      Depression Screening and Follow-up Plan: Patient was screened for depression during today's encounter  They screened negative with a PHQ-2 score of 0  Subjective        HPI:  This is a 19-year-old gentleman that presents to the office for preop consultation for toe surgery to be performed for ganglion cyst of the right  Toe  Patient has had progressive limitation from it  He is planning to have surgery  On the   He does have a history of hyperlipidemia and continues on Fenofibrate therapy  He also has underactive thyroid and continues levothyroxine 25 mcg daily  Review of Systems   Constitutional: Negative for chills, fatigue and fever  HENT: Negative for congestion, ear pain and sinus pressure  Eyes: Negative for visual disturbance  Respiratory: Negative for cough, chest tightness and shortness of breath  Cardiovascular: Negative for chest pain and palpitations  Gastrointestinal: Negative for diarrhea, nausea and vomiting  Endocrine: Negative for polyuria  Genitourinary: Negative for dysuria and frequency  Musculoskeletal: Negative for arthralgias and myalgias  Skin: Negative for pallor and rash  Neurological: Negative for dizziness, weakness, light-headedness, numbness and headaches  Psychiatric/Behavioral: Negative for agitation, behavioral problems and sleep disturbance  All other systems reviewed and are negative        Current Outpatient Medications on File Prior to Visit   Medication Sig   • albuterol (Proventil HFA) 90 mcg/act inhaler Inhale 2 puffs every 6 (six) hours as needed for wheezing   • fenofibrate 160 MG tablet TAKE 1 TABLET DAILY   • levothyroxine 25 mcg tablet TAKE 1 TABLET DAILY   • Neomycin-Polymyxin-Dexameth 0 1 % OINT Apply to eye 2 (two) times a day   • promethazine-codeine (PHENERGAN WITH CODEINE) 6 25-10 mg/5 mL syrup Take 2 tsp at bedtime as needed for cough  (Patient not taking: Reported on 11/29/2022)   • tadalafil (CIALIS) 5 MG tablet take 1 tablet by mouth once daily (Patient not taking: Reported on 11/29/2022)       Objective     /78 (BP Location: Left arm, Patient Position: Sitting, Cuff Size: Standard)   Pulse 74   Temp 99 °F (37 2 °C) (Tympanic)   Resp 16   Ht 5' 10" (1 778 m)   Wt 82 1 kg (181 lb)   SpO2 100%   BMI 25 97 kg/m²     Physical Exam  Vitals and nursing note reviewed  Constitutional:       General: He is not in acute distress  Appearance: He is well-developed and well-nourished  HENT:      Head: Normocephalic and atraumatic  Right Ear: External ear normal       Left Ear: External ear normal       Nose: Nose normal       Mouth/Throat:      Mouth: Oropharynx is clear and moist       Pharynx: No oropharyngeal exudate  Eyes:      Extraocular Movements: EOM normal       Conjunctiva/sclera: Conjunctivae normal       Pupils: Pupils are equal, round, and reactive to light  Neck:      Thyroid: No thyromegaly  Trachea: No tracheal deviation  Cardiovascular:      Rate and Rhythm: Normal rate and regular rhythm  Heart sounds: Normal heart sounds  No murmur heard  No friction rub  Pulmonary:      Effort: Pulmonary effort is normal  No respiratory distress  Breath sounds: Normal breath sounds  No wheezing or rales  Abdominal:      General: Bowel sounds are normal  There is no distension  Palpations: Abdomen is soft  Tenderness: There is no abdominal tenderness  There is no guarding or rebound  Musculoskeletal:         General: No tenderness or edema  Normal range of motion        Cervical back: Normal range of motion and neck supple  Lymphadenopathy:      Cervical: No cervical adenopathy  Skin:     General: Skin is warm and dry  Findings: No erythema or rash  Neurological:      Mental Status: He is alert and oriented to person, place, and time  Cranial Nerves: No cranial nerve deficit  Coordination: Coordination normal    Psychiatric:         Mood and Affect: Mood and affect normal          Behavior: Behavior normal          Thought Content:  Thought content normal        Stephanie Walls PA-C

## 2022-11-29 NOTE — PROGRESS NOTES
Presurgical Evaluation    Subjective:      Patient ID: Nory Lorenzo is a 62 y o  male  Chief Complaint   Patient presents with   • Pre-op Exam       HPI    {Common ambulatory SmartLinks:58132}    Procedure date: {DATE:23014}    Surgeon:  ***  Planned procedure:  ***  Diagnosis for procedure:  ***    Prior anesthesia: {[facility] ANESTHSIA :1165479352}    [facility]   NOTE: Patient should see Cardiology if time available before surgery, and if appropriate  Pulmonary History: { AMB PULM HX:3618812347}    Renal history: { AMB Renal Hx:2312428421}    Diabetes History:  {[facility] DIABETES TYPE:9389733890}     Neurological History: {[facility] neuro:5065366901}     On Immunosuppressant meds/biologics: {[facility] IMMUNOSUPRESSANTS:6261168569}      Review of Systems      Current Outpatient Medications   Medication Sig Dispense Refill   • albuterol (Proventil HFA) 90 mcg/act inhaler Inhale 2 puffs every 6 (six) hours as needed for wheezing 6 7 g 0   • fenofibrate 160 MG tablet TAKE 1 TABLET DAILY 90 tablet 3   • levothyroxine 25 mcg tablet TAKE 1 TABLET DAILY 90 tablet 3   • Neomycin-Polymyxin-Dexameth 0 1 % OINT Apply to eye 2 (two) times a day 3 5 g 0   • promethazine-codeine (PHENERGAN WITH CODEINE) 6 25-10 mg/5 mL syrup Take 2 tsp at bedtime as needed for cough  (Patient not taking: Reported on 11/29/2022) 120 mL 0   • tadalafil (CIALIS) 5 MG tablet take 1 tablet by mouth once daily (Patient not taking: Reported on 11/29/2022) 30 tablet 5     No current facility-administered medications for this visit         Allergies on file:   Simvastatin    Patient Active Problem List   Diagnosis   • Allergic rhinitis   • Erectile dysfunction of non-organic origin   • Hyperlipidemia   • Hypothyroidism   • Complete tear of left rotator cuff   • Chronic cough        Past Medical History:   Diagnosis Date   • COVID-19 virus infection 1/15/2022   • Disease of thyroid gland    • Shoulder pain     left shoulder pain   • Tinnitus        Past Surgical History:   Procedure Laterality Date   • COLONOSCOPY     • KNEE ARTHROSCOPY Left    • NE SHLDR ARTHROSCOP,SURG,W/ROTAT CUFF REPR Left 2019    Procedure: REPAIR ROTATOR CUFF  ARTHROSCOPIC;  Surgeon: Yu Valles MD;  Location: [facility];  Service: Orthopedics   • WISDOM TOOTH EXTRACTION         Family History   Problem Relation Age of Onset   • Coronary artery disease Mother    • Hyperlipidemia Brother    • Coronary artery disease Family        Social History     Tobacco Use   • Smoking status: Never   • Smokeless tobacco: Never   Substance Use Topics   • Alcohol use: Yes   • Drug use: No       Objective:    Vitals:    22 1522   BP: 130/78   BP Location: Left arm   Patient Position: Sitting   Cuff Size: Standard   Pulse: 74   Resp: 16   Temp: 99 °F (37 2 °C)   TempSrc: Tympanic   SpO2: 100%   Weight: 82 1 kg (181 lb)   Height: 5' 10" (1 778 m)        Physical Exam      Preop labs/testing available and reviewed: {YES/NO:}               EKG {YES/NO:}    Echo {YES/NO:}    Stress test/cath {YES/NO:}    PFT/Oneill {YES/NO:}    Functional capacity: {sl amb metabolic AGYWSIWYSNP:6423570511}   Pick the highest level patient can comfortably perform   4 mets or greater for surgery    RCRI  High Risk surgery? 1 Point  CAD History:         1 Point   MI; Positive Stress Test; CP due to Mi;  Nitrate Usage to control Angina;  Pathologic Q wave on EKG  CHF Active:         1 Point   Pulm Edema; Paroxysmal Nocturnal Dyspnea;  Bibasilar Rales (crackles);S3; CHF on CXR  Cerebrovascular Disease (TIA or CVA):     1 Point  DM on Insulin:        1 Point  Serum Creat >2 0 mg/dl:       1 Point          Total Points: {Numbers; 6-9:45720}     Scorin: Class I, Very Low Risk (0 4%)     1: Class II, Low risk (0 9%)     2: Class III Moderate (6 6%)     3: Class IV High (>11%)      BARBY Risk:  GFR:        For PCP:  If GFR>60, Hold ACE/ARB/Diuretic on the day of surgery, and NSAIDS 10 days before  If GFR<45, Consider PRE and POST op Nephrology Consult  If 46 <GFR> 59 : Has Patient had BARBY in last 6 Months? {YES/NO:20200}   If YES: Preop Nephrology consult   If No:  [facility] Nephrology consult  Assessment/Plan:    Patient {is/is not:34287} medically optimized (cleared) for the planned procedure  Further testing/evaluation {is/is not:21397} required  Postop concerns: {YES/NO:20200}    Problem List Items Addressed This Visit    None       {Assess/PlanSmartLinks:46042}      Pre-Surgery Instructions:   Medication Instructions   • albuterol (Proventil HFA) 90 mcg/act inhaler {OR PAT MED INSTRUCTIONS:14251}   • fenofibrate 160 MG tablet {OR PAT MED INSTRUCTIONS:69888}   • levothyroxine 25 mcg tablet {OR PAT MED INSTRUCTIONS:02550}   • Neomycin-Polymyxin-Dexameth 0 1 % OINT {OR PAT MED INSTRUCTIONS:81289}        NOTE: Please use the above to review important meds for your specialty, the remainder "per anesthesia Guidelines "    NOTE: Please place an Inbasket message for "[facility]" pool for complicated patients

## 2022-11-29 NOTE — PATIENT INSTRUCTIONS
Assessment/plan:   Preop consultation-patient is medically stable for planned procedure on the 21st of December for cyst of the toe  Patient did have EKG in the office which was within normal limits  2   Hyperlipidemia - patient has been stable on fenofibrate, no medication changes  3   Hypothyroidism - stable with levothyroxine, no medication changes  4   Cyst of the right toe -patient have surgical intervention as above

## 2023-04-03 ENCOUNTER — OFFICE VISIT (OUTPATIENT)
Dept: FAMILY MEDICINE CLINIC | Facility: CLINIC | Age: 58
End: 2023-04-03

## 2023-04-03 VITALS
BODY MASS INDEX: 25.8 KG/M2 | WEIGHT: 180.2 LBS | HEIGHT: 70 IN | HEART RATE: 68 BPM | DIASTOLIC BLOOD PRESSURE: 80 MMHG | OXYGEN SATURATION: 97 % | SYSTOLIC BLOOD PRESSURE: 140 MMHG

## 2023-04-03 DIAGNOSIS — J30.1 ALLERGIC RHINITIS DUE TO POLLEN, UNSPECIFIED SEASONALITY: ICD-10-CM

## 2023-04-03 DIAGNOSIS — E03.9 HYPOTHYROIDISM, UNSPECIFIED TYPE: ICD-10-CM

## 2023-04-03 DIAGNOSIS — Z12.5 SCREENING FOR MALIGNANT NEOPLASM OF PROSTATE: ICD-10-CM

## 2023-04-03 DIAGNOSIS — F52.21 ERECTILE DYSFUNCTION OF NON-ORGANIC ORIGIN: ICD-10-CM

## 2023-04-03 DIAGNOSIS — E78.5 HYPERLIPIDEMIA, UNSPECIFIED HYPERLIPIDEMIA TYPE: Primary | ICD-10-CM

## 2023-04-03 NOTE — PROGRESS NOTES
[facility] PRIMARY CARE    NAME: Lidia Villarreal  AGE: 62 y o  SEX: male  : 1965     DATE: 4/3/2023     Assessment and Plan:     Problem List Items Addressed This Visit        Endocrine    Hypothyroidism       Respiratory    Allergic rhinitis       Other    Erectile dysfunction of non-organic origin    Hyperlipidemia - Primary       Immunizations and preventive care screenings were discussed with patient today  Appropriate education was printed on patient's after visit summary  {Prostate cancer screening - consider in males between age of 43-69 depending on age, race, and risk factors  There is difference in the guidelines in regards to the optimal age for screening  USPSTF states to consider periodic screening in males between the age of 48 to 71  This text is informational and does not need to be selected but if you wish, you can insert standard documentation in your progress note by using F2 (Optional):36873}    Counseling:  · {Annual Physical; Counselin}         No follow-ups on file  Chief Complaint:     Chief Complaint   Patient presents with   • Physical Exam     Patient is present today for annual check up      History of Present Illness:     Adult Annual Physical   Patient here for a comprehensive physical exam  The patient reports {problems:59270}  Diet and Physical Activity  · Diet/Nutrition: {annual physical; diet:98615042}  · Exercise: {annual physical; exercise:2102}  Depression Screening  PHQ-2/9 Depression Screening    Little interest or pleasure in doing things: 0 - not at all  Feeling down, depressed, or hopeless: 0 - not at all  PHQ-2 Score: 0  PHQ-2 Interpretation: Negative depression screen       General Health  · Sleep: {annual physical; sleep:2102}  · Hearing: {annual physical; hearin}  · Vision: {annual physical; vision:}     · Dental: {annual physical; "dental:77414336}   Health  · Symptoms include: {annual physical; urinary symptoms:38340::\"none\"}     Review of Systems:     Review of Systems   Past Medical History:     Past Medical History:   Diagnosis Date   • COVID-19 virus infection 1/15/2022   • Disease of thyroid gland    • Shoulder pain     left shoulder pain   • Tinnitus       Past Surgical History:     Past Surgical History:   Procedure Laterality Date   • COLONOSCOPY     • KNEE ARTHROSCOPY Left    • SC SURGICAL ARTHROSCOPY SHOULDER W/ROTATOR CUFF RPR Left 12/12/2019    Procedure: REPAIR ROTATOR CUFF  ARTHROSCOPIC;  Surgeon: Katrine Rubinstein, MD;  Location: [facility] OR;  Service: Orthopedics   • WISDOM TOOTH EXTRACTION        Family History:     Family History   Problem Relation Age of Onset   • Coronary artery disease Mother    • Hyperlipidemia Brother    • Coronary artery disease Family       Social History:     Social History     Socioeconomic History   • Marital status: Single     Spouse name: None   • Number of children: 5   • Years of education: None   • Highest education level: None   Occupational History   • Occupation: EMPLOYED   Tobacco Use   • Smoking status: Never   • Smokeless tobacco: Never   Substance and Sexual Activity   • Alcohol use:  Yes   • Drug use: No   • Sexual activity: None   Other Topics Concern   • None   Social History Narrative    ALL SCRIPTS SAYS      Social Determinants of Health     Financial Resource Strain: Not on file   Food Insecurity: Not on file   Transportation Needs: Not on file   Physical Activity: Not on file   Stress: Not on file   Social Connections: Not on file   Intimate Partner Violence: Not on file   Housing Stability: Not on file      Current Medications:     Current Outpatient Medications   Medication Sig Dispense Refill   • albuterol (Proventil HFA) 90 mcg/act inhaler Inhale 2 puffs every 6 (six) hours as needed for wheezing 6 7 g 0   • fenofibrate 160 MG tablet TAKE 1 TABLET DAILY 90 tablet 3 " "  • levothyroxine 25 mcg tablet TAKE 1 TABLET DAILY 90 tablet 3   • Neomycin-Polymyxin-Dexameth 0 1 % OINT Apply to eye 2 (two) times a day (Patient not taking: Reported on 4/3/2023) 3 5 g 0   • promethazine-codeine (PHENERGAN WITH CODEINE) 6 25-10 mg/5 mL syrup Take 2 tsp at bedtime as needed for cough  (Patient not taking: Reported on 11/29/2022) 120 mL 0   • tadalafil (CIALIS) 5 MG tablet take 1 tablet by mouth once daily (Patient not taking: Reported on 11/29/2022) 30 tablet 5     No current facility-administered medications for this visit  Allergies:      Allergies   Allergen Reactions   • Simvastatin Myalgia      Physical Exam:     /80 (BP Location: Left arm, Patient Position: Sitting, Cuff Size: Standard)   Pulse 68   Ht 5' 10\" (1 778 m)   Wt 81 7 kg (180 lb 3 2 oz)   SpO2 97%   BMI 25 86 kg/m²     Physical Exam     Marie Green PA-C  [facility] PRIMARY CARE  "

## 2023-04-03 NOTE — PROGRESS NOTES
Name: Papito Velez      : 1965      MRN: [id number]  Encounter Provider: Brooks Campbell PA-C  Encounter Date: 4/3/2023   Encounter department: [address] [place] [address] PRIMARY CARE    Assessment & Plan     Patient Instructions   Assessment/plan:  1  Mixed hyperlipidemia-patient has been intolerant of simvastatin because of myalgia  He is currently stable on fenofibrate  We will recheck labs  2  Hypothyroidism-stable with levothyroxine 25 mcg daily  3   Allergic rhinitis-stable with OTC medication as necessary  4   Erectile dysfunction-stable with Cialis 5 mg   5   Ankle strain-this seems to be almost completely resolved  Patient has resumed normal activities without limitation  1  Hyperlipidemia, unspecified hyperlipidemia type  -     CBC and differential  -     Comprehensive metabolic panel  -     Lipid Panel with Direct LDL reflex  -     PSA, Total Screen  -     TSH, 3rd generation with Free T4 reflex    2  Hypothyroidism, unspecified type  -     CBC and differential  -     Comprehensive metabolic panel  -     Lipid Panel with Direct LDL reflex  -     PSA, Total Screen  -     TSH, 3rd generation with Free T4 reflex    3  Allergic rhinitis due to pollen, unspecified seasonality  -     CBC and differential  -     Comprehensive metabolic panel  -     Lipid Panel with Direct LDL reflex  -     PSA, Total Screen  -     TSH, 3rd generation with Free T4 reflex    4  Erectile dysfunction of non-organic origin    5  Screening for malignant neoplasm of prostate  -     PSA, Total Screen         Subjective      HPI: This is a 63-year-old gentleman that presents to the office with history of hyperlipidemia, hypothyroidism, allergic rhinitis, and erectile dysfunction  He has been doing well without any acute complaints  He continues to go to the gym 3 to 4 days/week    He did recently have some ankle strain which caused some swelling locally and difficulty walking but that seems to be almost completely resolved at this point  He does continue fenofibrate for his cholesterol  He has been intolerant of statins because of myalgia  He also continues on a low-dose of 25 mcg levothyroxine daily for his thyroid  He has not had labs in about a year  He denies any symptoms of chest pain or shortness of breath  Review of Systems   Constitutional: Negative for chills, fatigue and fever  HENT: Negative for congestion, ear pain and sinus pressure  Eyes: Negative for visual disturbance  Respiratory: Negative for cough, chest tightness and shortness of breath  Cardiovascular: Negative for chest pain and palpitations  Gastrointestinal: Negative for diarrhea, nausea and vomiting  Endocrine: Negative for polyuria  Genitourinary: Negative for dysuria and frequency  Musculoskeletal: Negative for arthralgias and myalgias  Skin: Negative for pallor and rash  Neurological: Negative for dizziness, weakness, light-headedness, numbness and headaches  Psychiatric/Behavioral: Negative for agitation, behavioral problems and sleep disturbance  All other systems reviewed and are negative  Current Outpatient Medications on File Prior to Visit   Medication Sig   • albuterol (Proventil HFA) 90 mcg/act inhaler Inhale 2 puffs every 6 (six) hours as needed for wheezing   • fenofibrate 160 MG tablet TAKE 1 TABLET DAILY   • levothyroxine 25 mcg tablet TAKE 1 TABLET DAILY   • tadalafil (CIALIS) 5 MG tablet take 1 tablet by mouth once daily (Patient not taking: Reported on 11/29/2022)   • [DISCONTINUED] Neomycin-Polymyxin-Dexameth 0 1 % OINT Apply to eye 2 (two) times a day (Patient not taking: Reported on 4/3/2023)   • [DISCONTINUED] promethazine-codeine (PHENERGAN WITH CODEINE) 6 25-10 mg/5 mL syrup Take 2 tsp at bedtime as needed for cough   (Patient not taking: Reported on 11/29/2022)       Objective     /80 (BP Location: Left arm, Patient Position: Sitting, Cuff Size: Standard)   Pulse 68   Ht "5' 10\" (1 778 m)   Wt 81 7 kg (180 lb 3 2 oz)   SpO2 97%   BMI 25 86 kg/m²     Physical Exam  Vitals and nursing note reviewed  Constitutional:       General: He is not in acute distress  Appearance: He is well-developed  HENT:      Head: Normocephalic and atraumatic  Right Ear: External ear normal       Left Ear: External ear normal       Nose: Nose normal       Mouth/Throat:      Pharynx: No oropharyngeal exudate  Eyes:      Conjunctiva/sclera: Conjunctivae normal       Pupils: Pupils are equal, round, and reactive to light  Neck:      Thyroid: No thyromegaly  Trachea: No tracheal deviation  Cardiovascular:      Rate and Rhythm: Normal rate and regular rhythm  Heart sounds: Normal heart sounds  No murmur heard  No friction rub  Pulmonary:      Effort: Pulmonary effort is normal  No respiratory distress  Breath sounds: Normal breath sounds  No wheezing or rales  Abdominal:      General: Bowel sounds are normal  There is no distension  Palpations: Abdomen is soft  Tenderness: There is no abdominal tenderness  There is no guarding or rebound  Musculoskeletal:         General: No tenderness  Normal range of motion  Cervical back: Normal range of motion and neck supple  Lymphadenopathy:      Cervical: No cervical adenopathy  Skin:     General: Skin is warm and dry  Findings: No erythema or rash  Neurological:      Mental Status: He is alert and oriented to person, place, and time  Cranial Nerves: No cranial nerve deficit  Coordination: Coordination normal    Psychiatric:         Behavior: Behavior normal          Thought Content:  Thought content normal        Netta Hernandez PA-C  "

## 2023-04-03 NOTE — PATIENT INSTRUCTIONS
Assessment/plan:  1  Mixed hyperlipidemia-patient has been intolerant of simvastatin because of myalgia  He is currently stable on fenofibrate  We will recheck labs  2  Hypothyroidism-stable with levothyroxine 25 mcg daily  3   Allergic rhinitis-stable with OTC medication as necessary  4   Erectile dysfunction-stable with Cialis 5 mg   5   Ankle strain-this seems to be almost completely resolved  Patient has resumed normal activities without limitation

## 2023-11-03 DIAGNOSIS — E03.9 HYPOTHYROIDISM, UNSPECIFIED TYPE: ICD-10-CM

## 2023-11-03 RX ORDER — LEVOTHYROXINE SODIUM 0.03 MG/1
TABLET ORAL
Qty: 90 TABLET | Refills: 1 | Status: SHIPPED | OUTPATIENT
Start: 2023-11-03

## 2023-11-03 NOTE — TELEPHONE ENCOUNTER
Endocrinology:  Hypothyroid Agents Ptqrnu0211/03/2023 01:05 AM   Protocol Details TSH within 360 days    Valid encounter within last 12 months   Last seen 4/3/23 last labs done 4/18/23 refilled per protocol.

## 2023-11-08 ENCOUNTER — TELEPHONE (OUTPATIENT)
Dept: FAMILY MEDICINE CLINIC | Facility: CLINIC | Age: 58
End: 2023-11-08

## 2023-11-09 NOTE — TELEPHONE ENCOUNTER
Lloyd Bhardwaj (Key: [id number]) PA Case ID #: [id number]  Rx #: [id number] Need Help?  Call us at [phone]  Status Sent to Plan today  Drug Tadalafil 5MG tablets    Form [employer] Electronic PA Form ([id number] NCPDP)

## 2023-11-13 NOTE — TELEPHONE ENCOUNTER
Estephanie Hart (Key: [id number]) PA Case ID #: [id number]  Rx #: [id number] Need Help? Call us at [phone]  Outcome Approved today  [id number];  Status:Approved; Review Type:Prior Auth; Coverage Start Date:10/09/2023; Coverage End Date:11/07/2024;;  [id number]:[id number];  Status:Denied; Review Type:Prior Auth;   Appeal Information:;  Authorization Expiration Date: 11/6/2024

## 2023-11-23 DIAGNOSIS — E78.2 MIXED HYPERLIPIDEMIA: ICD-10-CM

## 2023-11-24 RX ORDER — FENOFIBRATE 160 MG/1
TABLET ORAL
Qty: 90 TABLET | Refills: 0 | Status: SHIPPED | OUTPATIENT
Start: 2023-11-24

## 2023-12-01 ENCOUNTER — TELEPHONE (OUTPATIENT)
Dept: FAMILY MEDICINE CLINIC | Facility: CLINIC | Age: 58
End: 2023-12-01

## 2023-12-01 DIAGNOSIS — E78.2 MIXED HYPERLIPIDEMIA: Primary | ICD-10-CM

## 2023-12-01 DIAGNOSIS — E03.9 HYPOTHYROIDISM, UNSPECIFIED TYPE: ICD-10-CM

## 2023-12-01 NOTE — TELEPHONE ENCOUNTER
Pt has an appt set up for 12/8/23 he would like some labs sent in for him to get done prior to the appt. Please advise. Thank you.

## 2023-12-02 ENCOUNTER — RA CDI HCC (OUTPATIENT)
Dept: OTHER | Facility: HOSPITAL | Age: 58
End: 2023-12-02

## 2023-12-02 NOTE — PROGRESS NOTES
[address] [facility] coding opportunities       Chart reviewed, no opportunity found: CHART REVIEWED, NO OPPORTUNITY FOUND        Patients Insurance        Commercial Insurance: Tae Venegas

## 2023-12-08 ENCOUNTER — APPOINTMENT (OUTPATIENT)
Dept: LAB | Facility: CLINIC | Age: 58
End: 2023-12-08
Payer: COMMERCIAL

## 2023-12-08 LAB
ALBUMIN SERPL BCP-MCNC: 4.6 G/DL (ref 3.5–5)
ALP SERPL-CCNC: 42 U/L (ref 34–104)
ALT SERPL W P-5'-P-CCNC: 25 U/L (ref 7–52)
ANION GAP SERPL CALCULATED.3IONS-SCNC: 8 MMOL/L
AST SERPL W P-5'-P-CCNC: 26 U/L (ref 13–39)
BASOPHILS # BLD AUTO: 0.06 THOUSANDS/ÂΜL (ref 0–0.1)
BASOPHILS NFR BLD AUTO: 1 % (ref 0–1)
BILIRUB SERPL-MCNC: 0.65 MG/DL (ref 0.2–1)
BUN SERPL-MCNC: 13 MG/DL (ref 5–25)
CALCIUM SERPL-MCNC: 9 MG/DL (ref 8.4–10.2)
CHLORIDE SERPL-SCNC: 107 MMOL/L (ref 96–108)
CHOLEST SERPL-MCNC: 182 MG/DL
CO2 SERPL-SCNC: 27 MMOL/L (ref 21–32)
CREAT SERPL-MCNC: 1.15 MG/DL (ref 0.6–1.3)
EOSINOPHIL # BLD AUTO: 0.1 THOUSAND/ÂΜL (ref 0–0.61)
EOSINOPHIL NFR BLD AUTO: 2 % (ref 0–6)
ERYTHROCYTE [DISTWIDTH] IN BLOOD BY AUTOMATED COUNT: 12.6 % (ref 11.6–15.1)
GFR SERPL CREATININE-BSD FRML MDRD: 69 ML/MIN/1.73SQ M
GLUCOSE P FAST SERPL-MCNC: 102 MG/DL (ref 65–99)
HCT VFR BLD AUTO: 45.9 % (ref 36.5–49.3)
HDLC SERPL-MCNC: 53 MG/DL
HGB BLD-MCNC: 15 G/DL (ref 12–17)
IMM GRANULOCYTES # BLD AUTO: 0.01 THOUSAND/UL (ref 0–0.2)
IMM GRANULOCYTES NFR BLD AUTO: 0 % (ref 0–2)
LDLC SERPL CALC-MCNC: 101 MG/DL (ref 0–100)
LYMPHOCYTES # BLD AUTO: 1.62 THOUSANDS/ÂΜL (ref 0.6–4.47)
LYMPHOCYTES NFR BLD AUTO: 39 % (ref 14–44)
MCH RBC QN AUTO: 29.5 PG (ref 26.8–34.3)
MCHC RBC AUTO-ENTMCNC: 32.7 G/DL (ref 31.4–37.4)
MCV RBC AUTO: 90 FL (ref 82–98)
MONOCYTES # BLD AUTO: 0.34 THOUSAND/ÂΜL (ref 0.17–1.22)
MONOCYTES NFR BLD AUTO: 8 % (ref 4–12)
NEUTROPHILS # BLD AUTO: 2.08 THOUSANDS/ÂΜL (ref 1.85–7.62)
NEUTS SEG NFR BLD AUTO: 50 % (ref 43–75)
NRBC BLD AUTO-RTO: 0 /100 WBCS
PLATELET # BLD AUTO: 284 THOUSANDS/UL (ref 149–390)
PMV BLD AUTO: 9.9 FL (ref 8.9–12.7)
POTASSIUM SERPL-SCNC: 4.6 MMOL/L (ref 3.5–5.3)
PROT SERPL-MCNC: 6.6 G/DL (ref 6.4–8.4)
RBC # BLD AUTO: 5.09 MILLION/UL (ref 3.88–5.62)
SODIUM SERPL-SCNC: 142 MMOL/L (ref 135–147)
TRIGL SERPL-MCNC: 142 MG/DL
TSH SERPL DL<=0.05 MIU/L-ACNC: 2.97 UIU/ML (ref 0.45–4.5)
WBC # BLD AUTO: 4.21 THOUSAND/UL (ref 4.31–10.16)

## 2023-12-08 PROCEDURE — 80053 COMPREHEN METABOLIC PANEL: CPT | Performed by: PHYSICIAN ASSISTANT

## 2023-12-08 PROCEDURE — 80061 LIPID PANEL: CPT | Performed by: PHYSICIAN ASSISTANT

## 2023-12-08 PROCEDURE — 36415 COLL VENOUS BLD VENIPUNCTURE: CPT | Performed by: PHYSICIAN ASSISTANT

## 2023-12-08 PROCEDURE — 84443 ASSAY THYROID STIM HORMONE: CPT | Performed by: PHYSICIAN ASSISTANT

## 2023-12-08 PROCEDURE — 85025 COMPLETE CBC W/AUTO DIFF WBC: CPT | Performed by: PHYSICIAN ASSISTANT

## 2023-12-11 ENCOUNTER — OFFICE VISIT (OUTPATIENT)
Dept: FAMILY MEDICINE CLINIC | Facility: CLINIC | Age: 58
End: 2023-12-11
Payer: COMMERCIAL

## 2023-12-11 VITALS
HEART RATE: 60 BPM | HEIGHT: 70 IN | DIASTOLIC BLOOD PRESSURE: 80 MMHG | SYSTOLIC BLOOD PRESSURE: 140 MMHG | OXYGEN SATURATION: 99 % | BODY MASS INDEX: 25.62 KG/M2 | WEIGHT: 179 LBS

## 2023-12-11 DIAGNOSIS — F52.21 ERECTILE DYSFUNCTION OF NON-ORGANIC ORIGIN: ICD-10-CM

## 2023-12-11 DIAGNOSIS — E03.9 HYPOTHYROIDISM, UNSPECIFIED TYPE: ICD-10-CM

## 2023-12-11 DIAGNOSIS — Z12.5 SCREENING FOR PROSTATE CANCER: ICD-10-CM

## 2023-12-11 DIAGNOSIS — E78.5 HYPERLIPIDEMIA, UNSPECIFIED HYPERLIPIDEMIA TYPE: ICD-10-CM

## 2023-12-11 DIAGNOSIS — Z00.00 HEALTHCARE MAINTENANCE: Primary | ICD-10-CM

## 2023-12-11 PROCEDURE — 99396 PREV VISIT EST AGE 40-64: CPT | Performed by: PHYSICIAN ASSISTANT

## 2023-12-11 PROCEDURE — 99214 OFFICE O/P EST MOD 30 MIN: CPT | Performed by: PHYSICIAN ASSISTANT

## 2023-12-11 RX ORDER — TADALAFIL 5 MG/1
5 TABLET ORAL DAILY
Qty: 30 TABLET | Refills: 5 | Status: SHIPPED | OUTPATIENT
Start: 2023-12-11

## 2023-12-11 NOTE — PATIENT INSTRUCTIONS
Assessment/plan:  1. Healthcare maintenance-healthy-appearing 22-year-old gentleman. Vaccines are up-to-date. We discussed shingles vaccine. He does decline influenza vaccine. Colon cancer screening is up-to-date until 2025. Will order PSA with his next blood test.  He has been getting this done yearly. 2.  Mixed hyperlipidemia-stable with fenofibrate 160 mg daily. 3.  Hypothyroidism-stable with levothyroxine 25 mcg daily. 4.  Erectile dysfunction-stable with Cialis 5 mg daily.

## 2023-12-11 NOTE — PROGRESS NOTES
[facility] PRIMARY CARE    NAME: Zina Bueno  AGE: 62 y.o. SEX: male  : 1965     DATE: 2023     Assessment and Plan:     Problem List Items Addressed This Visit        Endocrine    Hypothyroidism    Relevant Orders    CBC and differential    Comprehensive metabolic panel    Lipid Panel with Direct LDL reflex    TSH, 3rd generation       Other    Erectile dysfunction of non-organic origin    Relevant Medications    tadalafil (CIALIS) 5 MG tablet    Other Relevant Orders    CBC and differential    Comprehensive metabolic panel    Lipid Panel with Direct LDL reflex    TSH, 3rd generation    Hyperlipidemia    Relevant Orders    CBC and differential    Comprehensive metabolic panel    Lipid Panel with Direct LDL reflex    TSH, 3rd generation   Other Visit Diagnoses     Healthcare maintenance    -  Primary    Screening for prostate cancer        Relevant Orders    PSA, Total Screen          Immunizations and preventive care screenings were discussed with patient today. Appropriate education was printed on patient's after visit summary. {Prostate cancer screening - consider in males between age of 37-78 depending on age, race, and risk factors. There is difference in the guidelines in regards to the optimal age for screening. USPSTF states to consider periodic screening in males between the age of 48 to 71. This text is informational and does not need to be selected but if you wish, you can insert standard documentation in your progress note by using F2 (Optional):09971}    Counseling:  {Annual Physical; Counselin}      Depression Screening and Follow-up Plan: Patient was screened for depression during today's encounter. They screened negative with a PHQ-2 score of 0. No follow-ups on file.      Chief Complaint:     Chief Complaint   Patient presents with   • Physical Exam     Patient is present today for annual physical exam      History of Present Illness:     Adult Annual Physical   Patient here for a comprehensive physical exam. The patient reports {problems:71615}. Diet and Physical Activity  Diet/Nutrition: {annual physical; diet:44149700}. Exercise: {annual physical; exercise:72861528}. Depression Screening  PHQ-2/9 Depression Screening    Little interest or pleasure in doing things: 0 - not at all  Feeling down, depressed, or hopeless: 0 - not at all  PHQ-2 Score: 0  PHQ-2 Interpretation: Negative depression screen       General Health  Sleep: {annual physical; sleep:2102}. Hearing: {annual physical; hearin}. Vision: {annual physical; vision:}. Dental: {annual physical; dental:}.  Health  Symptoms include: {annual physical; urinary symptoms:54383::"none"}    Advanced Care Planning  Do you have an advanced directive? {YES/NO:}  Do you have a durable medical power of ?  {YES/NO:}     Review of Systems:     Review of Systems   Past Medical History:     Past Medical History:   Diagnosis Date   • COVID-19 virus infection 1/15/2022   • Disease of thyroid gland    • Shoulder pain     left shoulder pain   • Tinnitus       Past Surgical History:     Past Surgical History:   Procedure Laterality Date   • COLONOSCOPY     • KNEE ARTHROSCOPY Left    • [place] SURGICAL ARTHROSCOPY SHOULDER W/ROTATOR CUFF RPR Left 2019    Procedure: REPAIR ROTATOR CUFF  ARTHROSCOPIC;  Surgeon: India Knox MD;  Location: [facility];  Service: Orthopedics   • WISDOM TOOTH EXTRACTION        Family History:     Family History   Problem Relation Age of Onset   • Coronary artery disease Mother    • Hyperlipidemia Brother    • Coronary artery disease Family       Social History:     Social History     Socioeconomic History   • Marital status: /Civil Union     Spouse name: None   • Number of children: 5   • Years of education: None   • Highest education level: None Occupational History   • Occupation: EMPLOYED   Tobacco Use   • Smoking status: Never   • Smokeless tobacco: Never   Substance and Sexual Activity   • Alcohol use: Yes   • Drug use: No   • Sexual activity: None   Other Topics Concern   • None   Social History Narrative    ALL SCRIPTS SAYS      Social Determinants of Health     Financial Resource Strain: Not on file   Food Insecurity: Not on file   Transportation Needs: Not on file   Physical Activity: Not on file   Stress: Not on file   Social Connections: Not on file   Intimate Partner Violence: Not on file   Housing Stability: Not on file      Current Medications:     Current Outpatient Medications   Medication Sig Dispense Refill   • albuterol (Proventil HFA) 90 mcg/act inhaler Inhale 2 puffs every 6 (six) hours as needed for wheezing 6.7 g 0   • fenofibrate 160 MG tablet TAKE 1 TABLET DAILY 90 tablet 0   • levothyroxine 25 mcg tablet TAKE 1 TABLET DAILY 90 tablet 1   • tadalafil (CIALIS) 5 MG tablet Take 1 tablet (5 mg total) by mouth daily 30 tablet 5     No current facility-administered medications for this visit. Allergies:      Allergies   Allergen Reactions   • Simvastatin Myalgia      Physical Exam:     /80 (BP Location: Left arm, Patient Position: Sitting, Cuff Size: Standard)   Pulse 60   Ht 5' 10.25" (1.784 m)   Wt 81.2 kg (179 lb)   SpO2 99%   BMI 25.50 kg/m²     Physical Exam     Rocío Trevino PA-C  [facility] PRIMARY CARE

## 2023-12-11 NOTE — PROGRESS NOTES
Name: Kassy Bains      : 1965      MRN: [id number]  Encounter Provider: Koby Bond PA-C  Encounter Date: 2023   Encounter department: [address] [place] [address] PRIMARY CARE    Assessment & Plan     Patient Instructions   Assessment/plan:  1. Healthcare maintenance-healthy-appearing 59-year-old gentleman. Vaccines are up-to-date. We discussed shingles vaccine. He does decline influenza vaccine. Colon cancer screening is up-to-date until . Will order PSA with his next blood test.  He has been getting this done yearly. 2.  Mixed hyperlipidemia-stable with fenofibrate 160 mg daily. 3.  Hypothyroidism-stable with levothyroxine 25 mcg daily. 4.  Erectile dysfunction-stable with Cialis 5 mg daily. 1. Healthcare maintenance    2. Hypothyroidism, unspecified type  -     CBC and differential; Future; Expected date: 2024  -     Comprehensive metabolic panel; Future; Expected date: 2024  -     Lipid Panel with Direct LDL reflex; Future; Expected date: 2024  -     TSH, 3rd generation; Future; Expected date: 2024    3. Hyperlipidemia, unspecified hyperlipidemia type  -     CBC and differential; Future; Expected date: 2024  -     Comprehensive metabolic panel; Future; Expected date: 2024  -     Lipid Panel with Direct LDL reflex; Future; Expected date: 2024  -     TSH, 3rd generation; Future; Expected date: 2024    4. Erectile dysfunction of non-organic origin  -     CBC and differential; Future; Expected date: 2024  -     Comprehensive metabolic panel; Future; Expected date: 2024  -     Lipid Panel with Direct LDL reflex; Future; Expected date: 2024  -     TSH, 3rd generation; Future; Expected date: 2024  -     tadalafil (CIALIS) 5 MG tablet; Take 1 tablet (5 mg total) by mouth daily    5. Screening for prostate cancer  -     PSA, Total Screen; Future; Expected date: 2024      BMI Counseling:  Body mass index is 25.5 kg/m². The BMI is above normal. Nutrition recommendations include encouraging healthy choices of fruits and vegetables. Exercise recommendations include moderate physical activity 150 minutes/week. Rationale for BMI follow-up plan is due to patient being overweight or obese. Depression Screening and Follow-up Plan: Patient was screened for depression during today's encounter. They screened negative with a PHQ-2 score of 0. Subjective      HPI: This is a 80-year-old gentleman that presents to the office for follow-up of chronic health conditions and annual physical.  He does have a history of hypothyroidism and continues on levothyroxine 25 mcg daily. He also continues on fenofibrate 160 mg daily for elevated triglycerides. He continues to go to the gym and exercises regularly. He has not had any difficulty with chest pains or shortness of breath. His weight has been very stable over the past 8 months. He has not had any digestive issues. Review of Systems   Constitutional:  Negative for chills, fatigue and fever. HENT:  Negative for congestion, ear pain and sinus pressure. Eyes:  Negative for visual disturbance. Respiratory:  Negative for cough, chest tightness and shortness of breath. Cardiovascular:  Negative for chest pain and palpitations. Gastrointestinal:  Negative for diarrhea, nausea and vomiting. Endocrine: Negative for polyuria. Genitourinary:  Negative for dysuria and frequency. Musculoskeletal:  Negative for arthralgias and myalgias. Skin:  Negative for pallor and rash. Neurological:  Negative for dizziness, weakness, light-headedness, numbness and headaches. Psychiatric/Behavioral:  Negative for agitation, behavioral problems and sleep disturbance. All other systems reviewed and are negative.       Current Outpatient Medications on File Prior to Visit   Medication Sig   • albuterol (Proventil HFA) 90 mcg/act inhaler Inhale 2 puffs every 6 (six) hours as needed for wheezing   • fenofibrate 160 MG tablet TAKE 1 TABLET DAILY   • levothyroxine 25 mcg tablet TAKE 1 TABLET DAILY   • [DISCONTINUED] tadalafil (CIALIS) 5 MG tablet Take 1 tablet (5 mg total) by mouth daily       Objective     /80 (BP Location: Left arm, Patient Position: Sitting, Cuff Size: Standard)   Pulse 60   Ht 5' 10.25" (1.784 m)   Wt 81.2 kg (179 lb)   SpO2 99%   BMI 25.50 kg/m²     Physical Exam  Vitals and nursing note reviewed. Constitutional:       General: He is not in acute distress. Appearance: He is well-developed. HENT:      Head: Normocephalic and atraumatic. Right Ear: External ear normal.      Left Ear: External ear normal.      Nose: Nose normal.      Mouth/Throat:      Pharynx: No oropharyngeal exudate. Eyes:      Conjunctiva/sclera: Conjunctivae normal.      Pupils: Pupils are equal, round, and reactive to light. Neck:      Thyroid: No thyromegaly. Trachea: No tracheal deviation. Cardiovascular:      Rate and Rhythm: Normal rate and regular rhythm. Heart sounds: Normal heart sounds. No murmur heard. No friction rub. Pulmonary:      Effort: Pulmonary effort is normal. No respiratory distress. Breath sounds: Normal breath sounds. No wheezing or rales. Abdominal:      General: Bowel sounds are normal. There is no distension. Palpations: Abdomen is soft. Tenderness: There is no abdominal tenderness. There is no guarding or rebound. Musculoskeletal:         General: No tenderness. Normal range of motion. Cervical back: Normal range of motion and neck supple. Lymphadenopathy:      Cervical: No cervical adenopathy. Skin:     General: Skin is warm and dry. Findings: No erythema or rash. Neurological:      Mental Status: He is alert and oriented to person, place, and time. Cranial Nerves: No cranial nerve deficit.       Coordination: Coordination normal.   Psychiatric:         Behavior: Behavior normal. Thought Content:  Thought content normal.       Rhiannon Robertson PA-C

## 2024-02-21 DIAGNOSIS — E78.2 MIXED HYPERLIPIDEMIA: ICD-10-CM

## 2024-02-21 RX ORDER — FENOFIBRATE 160 MG/1
160 TABLET ORAL DAILY
Qty: 90 TABLET | Refills: 1 | Status: SHIPPED | OUTPATIENT
Start: 2024-02-21

## 2024-05-01 DIAGNOSIS — E03.9 HYPOTHYROIDISM, UNSPECIFIED TYPE: ICD-10-CM

## 2024-05-02 RX ORDER — LEVOTHYROXINE SODIUM 0.03 MG/1
TABLET ORAL
Qty: 90 TABLET | Refills: 1 | Status: SHIPPED | OUTPATIENT
Start: 2024-05-02

## 2024-06-10 ENCOUNTER — OFFICE VISIT (OUTPATIENT)
Dept: FAMILY MEDICINE CLINIC | Facility: CLINIC | Age: 59
End: 2024-06-10
Payer: COMMERCIAL

## 2024-06-10 VITALS
BODY MASS INDEX: 25.36 KG/M2 | DIASTOLIC BLOOD PRESSURE: 88 MMHG | HEART RATE: 64 BPM | OXYGEN SATURATION: 98 % | TEMPERATURE: 97.4 F | SYSTOLIC BLOOD PRESSURE: 140 MMHG | WEIGHT: 178 LBS

## 2024-06-10 DIAGNOSIS — E03.9 HYPOTHYROIDISM, UNSPECIFIED TYPE: ICD-10-CM

## 2024-06-10 DIAGNOSIS — R10.30 LOWER ABDOMINAL PAIN: Primary | ICD-10-CM

## 2024-06-10 DIAGNOSIS — E78.5 HYPERLIPIDEMIA, UNSPECIFIED HYPERLIPIDEMIA TYPE: ICD-10-CM

## 2024-06-10 PROCEDURE — 99214 OFFICE O/P EST MOD 30 MIN: CPT | Performed by: PHYSICIAN ASSISTANT

## 2024-06-10 NOTE — PATIENT INSTRUCTIONS
Assessment/plan:  1.  Lower abdominal pain-patient with intermittent pulling pain and discomfort in the right lower inguinal area, often 2-3 out of 10 discomfort but 5 out of 10 at its worst.  On exam I was not able to palpate any obvious hernia or bulge.  Would recommend CT abdomen and pelvis, rule out any appendiceal abnormalities and refer to general surgery for consult.  2.  Hypothyroidism-stable with levothyroxine 25 mcg daily.  3.  Hyperlipidemia-stable with fenofibrate 160 mg daily.

## 2024-06-10 NOTE — PROGRESS NOTES
Ambulatory Visit  Name: Danilo Dominguez      : 1965      MRN: [id number]  Encounter Provider: Sabas Davis PA-C  Encounter Date: 6/10/2024   Encounter department: [facility] PRIMARY CARE  Patient Instructions   Assessment/plan:  1.  Lower abdominal pain-patient with intermittent pulling pain and discomfort in the right lower inguinal area, often 2-3 out of 10 discomfort but 5 out of 10 at its worst.  On exam I was not able to palpate any obvious hernia or bulge.  Would recommend CT abdomen and pelvis, rule out any appendiceal abnormalities and refer to general surgery for consult.  2.  Hypothyroidism-stable with levothyroxine 25 mcg daily.  3.  Hyperlipidemia-stable with fenofibrate 160 mg daily.    Assessment & Plan   1. Lower abdominal pain  -     CT abdomen pelvis w contrast; Future; Expected date: 06/10/2024  -     Ambulatory Referral to General Surgery; Future  2. Hypothyroidism, unspecified type  3. Hyperlipidemia, unspecified hyperlipidemia type       History of Present Illness   {Disappearing Hyperlinks I Encounters * My Last Note * Since Last Visit * History :80280}  HPI: This is a 58-year-old gentleman that presents to the office with concerns over some inguinal discomfort on his right side.  He states he is 104 brothers and all 3 of his other siblings have had hernias of the lower abdomen/inguinal area.  He does not recall any injury to this area and did not have any initial pop or snap.  He describes it more as a pulling sensation in the right lower inguinal area and sometimes radiates towards the testicle.  At its worst this tends to be to 4-5 out of 10 pain.  Often at rest it is 2 or 3 out of 10.  He feels it is more related to activity but he still continues to go to the gym regularly.  He does not have any bowel abnormalities and has not had any constipation or diarrhea.  Food does not seem to affect in the issue.  He has not had any fevers or chills.        Review of Systems    Constitutional:  Negative for chills, fatigue and fever.   HENT:  Negative for congestion, ear pain and sinus pressure.    Eyes:  Negative for visual disturbance.   Respiratory:  Negative for cough, chest tightness and shortness of breath.    Cardiovascular:  Negative for chest pain and palpitations.   Gastrointestinal:  Positive for abdominal pain. Negative for diarrhea, nausea and vomiting.   Endocrine: Negative for polyuria.   Genitourinary:  Negative for dysuria and frequency.   Musculoskeletal:  Negative for arthralgias and myalgias.   Skin:  Negative for pallor and rash.   Neurological:  Negative for dizziness, weakness, light-headedness, numbness and headaches.   Psychiatric/Behavioral:  Negative for agitation, behavioral problems and sleep disturbance.    All other systems reviewed and are negative.      Objective   {Disappearing Hyperlinks   Review Vitals * Enter New Vitals * Results Review * Labs * Imaging * Cardiology * Procedures * Lung Cancer Screening :19695}  /88 (BP Location: Left arm, Patient Position: Sitting, Cuff Size: Standard)   Pulse 64   Temp (!) 97.4 °F (36.3 °C) (Tympanic)   Wt 80.7 kg (178 lb)   SpO2 98%   BMI 25.36 kg/m²     Physical Exam  Constitutional:       Appearance: He is well-developed.   HENT:      Head: Normocephalic.   Cardiovascular:      Rate and Rhythm: Normal rate and regular rhythm.   Pulmonary:      Effort: Pulmonary effort is normal. No respiratory distress.      Breath sounds: Normal breath sounds.   Abdominal:      General: Bowel sounds are normal.      Palpations: Abdomen is soft.      Tenderness: There is abdominal tenderness in the right lower quadrant. There is no right CVA tenderness, left CVA tenderness, guarding or rebound.   Musculoskeletal:         General: Normal range of motion.   Neurological:      Mental Status: He is alert and oriented to person, place, and time.       Administrative Statements {Disappearing Hyperlinks I  Level of Service *  [id number]}

## 2024-06-26 ENCOUNTER — HOSPITAL ENCOUNTER (OUTPATIENT)
Dept: CT IMAGING | Facility: HOSPITAL | Age: 59
Discharge: HOME | End: 2024-06-26
Payer: COMMERCIAL

## 2024-06-26 DIAGNOSIS — R10.30 LOWER ABDOMINAL PAIN: ICD-10-CM

## 2024-06-26 PROCEDURE — 74177 CT ABD & PELVIS W/CONTRAST: CPT

## 2024-06-26 RX ADMIN — IOHEXOL 100 ML: 350 INJECTION, SOLUTION INTRAVENOUS at 17:31

## 2024-07-09 ENCOUNTER — CONSULT (OUTPATIENT)
Dept: SURGERY | Facility: CLINIC | Age: 59
End: 2024-07-09
Payer: COMMERCIAL

## 2024-07-09 VITALS
BODY MASS INDEX: 25.2 KG/M2 | HEIGHT: 70 IN | DIASTOLIC BLOOD PRESSURE: 85 MMHG | HEART RATE: 71 BPM | SYSTOLIC BLOOD PRESSURE: 142 MMHG | WEIGHT: 176 LBS

## 2024-07-09 DIAGNOSIS — K40.90 NON-RECURRENT UNILATERAL INGUINAL HERNIA WITHOUT OBSTRUCTION OR GANGRENE: Primary | ICD-10-CM

## 2024-07-09 DIAGNOSIS — R10.30 LOWER ABDOMINAL PAIN: ICD-10-CM

## 2024-07-09 PROCEDURE — 99243 OFF/OP CNSLTJ NEW/EST LOW 30: CPT | Performed by: SURGERY

## 2024-07-09 RX ORDER — SODIUM CHLORIDE, SODIUM LACTATE, POTASSIUM CHLORIDE, CALCIUM CHLORIDE 600; 310; 30; 20 MG/100ML; MG/100ML; MG/100ML; MG/100ML
125 INJECTION, SOLUTION INTRAVENOUS CONTINUOUS
OUTPATIENT
Start: 2024-08-15

## 2024-07-09 NOTE — PROGRESS NOTES
Ambulatory Visit  Name: Danilo Dominguez      : 1965      MRN: [id number]  Encounter Provider: Bruce Lewis MD  Encounter Date: 2024   Encounter department: [facility]    Assessment & Plan   1. Non-recurrent unilateral inguinal hernia without obstruction or gangrene  Assessment & Plan:  On exam he is a small right inguinal hernia.  Small amount of fatty tissue likely at the cord lipoma or the hernia sac.  I reviewed the CT scan and also showed him the images.  There is a small fat-containing right inguinal hernia on CT as well.  Will plan on a robotic repair of his right inguinal hernia at [facility] the risks benefits alternative explained to him is agreeable to proceed.  Orders:  -     Case request operating room: REPAIR HERNIA INGUINAL LAPAROSCOPIC W/ ROBOTICS; Standing  -     Case request operating room: REPAIR HERNIA INGUINAL LAPAROSCOPIC W/ ROBOTICS  2. Lower abdominal pain  -     Ambulatory Referral to General Surgery      History of Present Illness     Danilo Dominguez is a 58 y.o. male who presents for evaluation of right groin and lower abdominal pain.  He states he had pain in the area on and off for years.  He has discomfort off with sitting and can point to his right groin.  He is able to go to gym without significant difficulty.  Denies association to eating or bowel movements.  He has 3 brothers all of whom have had hernia repairs in the past.    Review of Systems   Constitutional:  Negative for chills and fever.   HENT:  Negative for ear pain and sore throat.    Eyes:  Negative for pain and visual disturbance.   Respiratory:  Negative for cough and shortness of breath.    Cardiovascular:  Negative for chest pain and palpitations.   Gastrointestinal:  Negative for abdominal pain and vomiting.   Musculoskeletal:  Negative for arthralgias and back pain.   Skin:  Negative for color change and rash.   Neurological:  Negative for seizures and  "syncope.   Psychiatric/Behavioral:  Negative for agitation and behavioral problems.    All other systems reviewed and are negative.      Objective     /85 (BP Location: Right arm, Patient Position: Sitting, Cuff Size: Large)   Pulse 71   Ht 5' 10.25\" (1.784 m)   Wt 79.8 kg (176 lb)   BMI 25.07 kg/m²     Physical Exam  Vitals and nursing note reviewed.   Constitutional:       General: He is not in acute distress.     Appearance: He is well-developed. He is not diaphoretic.   HENT:      Head: Normocephalic and atraumatic.   Eyes:      Pupils: Pupils are equal, round, and reactive to light.   Cardiovascular:      Rate and Rhythm: Normal rate and regular rhythm.   Pulmonary:      Effort: Pulmonary effort is normal. No respiratory distress.   Abdominal:      General: Bowel sounds are normal.      Palpations: Abdomen is soft.      Comments: Small reducible right inguinal hernia   Musculoskeletal:         General: Normal range of motion.      Cervical back: Normal range of motion and neck supple.   Skin:     General: Skin is warm and dry.   Neurological:      Mental Status: He is alert and oriented to person, place, and time.   Psychiatric:         Behavior: Behavior normal.       Administrative Statements           "

## 2024-07-09 NOTE — ASSESSMENT & PLAN NOTE
On exam he is a small right inguinal hernia.  Small amount of fatty tissue likely at the cord lipoma or the hernia sac.  I reviewed the CT scan and also showed him the images.  There is a small fat-containing right inguinal hernia on CT as well.  Will plan on a robotic repair of his right inguinal hernia at [facility] the risks benefits alternative explained to him is agreeable to proceed.

## 2024-08-01 ENCOUNTER — ANESTHESIA EVENT (OUTPATIENT)
Dept: PERIOP | Facility: HOSPITAL | Age: 59
End: 2024-08-01
Payer: COMMERCIAL

## 2024-08-06 NOTE — PRE-PROCEDURE INSTRUCTIONS
Pre-Surgery Instructions:   Medication Instructions    albuterol (Proventil HFA) 90 mcg/act inhaler Uses PRN- OK to take day of surgery    COLLAGEN PO Stop taking 7 days prior to surgery.    fenofibrate 160 MG tablet Take day of surgery.    levothyroxine 25 mcg tablet Take day of surgery.    tadalafil (CIALIS) 5 MG tablet Instructed to hold 24 hrs prior to surgery per anesthesia guidelines    Pt instructed on use of cleanser for DOS and instructions for showering both night before and DOS reviewed with pt - pt verbalized understanding of instructions given  Pt also instructed on no hair or body products on skin for DOS.  No shaving with a razor blade for 7 days prior to surgery to decrease any incident of infection - electric razor is ok to use up till 24 hrs prior to DOS.  Pt instructed that if with any changes in health status between now and DOS - notify surgeon office.  Tylenol is ok to use as needed up to and including DOS with sips of water - if needed - did instruct NO NSAIDS to be used at least 3 days prior to surgery - or if given a longer hold time of NSAIDS from MD please follow MD hold instructions.  Instructed to bring photo ID and medical insurance card for DOS as forms of identification for DOS.  Also instructed pt on no jewelry or body piercings, no valuables on body for DOS. Contact lenses, if worn - need to be removed for DOS.  Pt instructed does need transport home after surgery- pt is not allowed to drive.  Medication instructions for day surgery reviewed. Please use only a sip of water to take your instructed medications. Avoid all over the counter vitamins, supplements and NSAIDS for one week prior to surgery per anesthesia guidelines. Tylenol is ok to take as needed.     You will receive a call one business day prior to surgery with an arrival time and hospital directions. If your surgery is scheduled on a Monday, the hospital will be calling you on the Friday prior to your surgery. If you  have not heard from anyone by 8pm, please call the hospital supervisor through the hospital  at [phone]. ([place] [phone] or [place] [phone]).    Do not eat or drink anything after midnight the night before your surgery, including candy, mints, lifesavers, or chewing gum. Do not drink alcohol 24hrs before your surgery. Try not to smoke at least 24hrs before your surgery.       Follow the pre surgery showering instructions as listed in the “My Surgical Experience Booklet” or otherwise provided by your surgeon's office. Do not use a blade to shave the surgical area 1 week before surgery. It is okay to use a clean electric clippers up to 24 hours before surgery. Do not apply any lotions, creams, including makeup, cologne, deodorant, or perfumes after showering on the day of your surgery. Do not use dry shampoo, hair spray, hair gel, or any type of hair products.     No contact lenses, eye make-up, or artificial eyelashes. Remove nail polish, including gel polish, and any artificial, gel, or acrylic nails if possible. Remove all jewelry including rings and body piercing jewelry.     Wear causal clothing that is easy to take on and off. Consider your type of surgery.    Keep any valuables, jewelry, piercings at home. Please bring any specially ordered equipment (sling, braces) if indicated.    Arrange for a responsible person to drive you to and from the hospital on the day of your surgery. Please confirm the visitor policy for the day of your procedure when you receive your phone call with an arrival time.     Call the surgeon's office with any new illnesses, exposures, or additional questions prior to surgery.    Please reference your “My Surgical Experience Booklet” for additional information to prepare for your upcoming surgery.

## 2024-08-15 ENCOUNTER — ANESTHESIA (OUTPATIENT)
Dept: PERIOP | Facility: HOSPITAL | Age: 59
End: 2024-08-15
Payer: COMMERCIAL

## 2024-08-15 ENCOUNTER — HOSPITAL ENCOUNTER (OUTPATIENT)
Facility: HOSPITAL | Age: 59
Setting detail: OUTPATIENT SURGERY
Discharge: HOME | End: 2024-08-15
Attending: SURGERY | Admitting: SURGERY
Payer: COMMERCIAL

## 2024-08-15 VITALS
RESPIRATION RATE: 16 BRPM | DIASTOLIC BLOOD PRESSURE: 64 MMHG | HEIGHT: 70 IN | SYSTOLIC BLOOD PRESSURE: 133 MMHG | TEMPERATURE: 97.2 F | BODY MASS INDEX: 24.4 KG/M2 | WEIGHT: 170.42 LBS | HEART RATE: 84 BPM | OXYGEN SATURATION: 97 %

## 2024-08-15 DIAGNOSIS — K40.90 NON-RECURRENT UNILATERAL INGUINAL HERNIA WITHOUT OBSTRUCTION OR GANGRENE: ICD-10-CM

## 2024-08-15 PROBLEM — K40.20 NON-RECURRENT BILATERAL INGUINAL HERNIA WITHOUT OBSTRUCTION OR GANGRENE: Status: ACTIVE | Noted: 2024-08-15

## 2024-08-15 PROCEDURE — C1781 MESH (IMPLANTABLE): HCPCS | Performed by: SURGERY

## 2024-08-15 PROCEDURE — 88302 TISSUE EXAM BY PATHOLOGIST: CPT | Performed by: PATHOLOGY

## 2024-08-15 PROCEDURE — NC001 PR NO CHARGE: Performed by: PHYSICIAN ASSISTANT

## 2024-08-15 PROCEDURE — S2900 ROBOTIC SURGICAL SYSTEM: HCPCS | Performed by: SURGERY

## 2024-08-15 PROCEDURE — NC001 PR NO CHARGE: Performed by: SURGERY

## 2024-08-15 PROCEDURE — 49650 LAP ING HERNIA REPAIR INIT: CPT | Performed by: SURGERY

## 2024-08-15 DEVICE — 3DMAX™ MID ANATOMICAL MESH, LARGE, RIGHT, 4” X 6”, 10 X 16 CM
Type: IMPLANTABLE DEVICE | Site: INGUINAL | Status: FUNCTIONAL
Brand: 3DMAX™ MID ANATOMICAL MESH

## 2024-08-15 DEVICE — 3DMAX™ MID ANATOMICAL MESH, XL, LEFT, 5” X 7”, 12 X 17 CM
Type: IMPLANTABLE DEVICE | Site: INGUINAL | Status: FUNCTIONAL
Brand: 3DMAX™ MID ANATOMICAL MESH

## 2024-08-15 RX ORDER — PROPOFOL 10 MG/ML
INJECTION, EMULSION INTRAVENOUS AS NEEDED
Status: DISCONTINUED | OUTPATIENT
Start: 2024-08-15 | End: 2024-08-15

## 2024-08-15 RX ORDER — HYDROMORPHONE HCL/PF 1 MG/ML
0.5 SYRINGE (ML) INJECTION
Status: DISCONTINUED | OUTPATIENT
Start: 2024-08-15 | End: 2024-08-15 | Stop reason: HOSPADM

## 2024-08-15 RX ORDER — OXYCODONE AND ACETAMINOPHEN 5; 325 MG/1; MG/1
1 TABLET ORAL EVERY 4 HOURS PRN
Status: DISCONTINUED | OUTPATIENT
Start: 2024-08-15 | End: 2024-08-15 | Stop reason: HOSPADM

## 2024-08-15 RX ORDER — HYDROMORPHONE HCL/PF 1 MG/ML
SYRINGE (ML) INJECTION AS NEEDED
Status: DISCONTINUED | OUTPATIENT
Start: 2024-08-15 | End: 2024-08-15

## 2024-08-15 RX ORDER — SODIUM CHLORIDE, SODIUM LACTATE, POTASSIUM CHLORIDE, CALCIUM CHLORIDE 600; 310; 30; 20 MG/100ML; MG/100ML; MG/100ML; MG/100ML
125 INJECTION, SOLUTION INTRAVENOUS CONTINUOUS
Status: DISCONTINUED | OUTPATIENT
Start: 2024-08-15 | End: 2024-08-15 | Stop reason: HOSPADM

## 2024-08-15 RX ORDER — EPHEDRINE SULFATE 50 MG/ML
INJECTION INTRAVENOUS AS NEEDED
Status: DISCONTINUED | OUTPATIENT
Start: 2024-08-15 | End: 2024-08-15

## 2024-08-15 RX ORDER — BUPIVACAINE HYDROCHLORIDE AND EPINEPHRINE 2.5; 5 MG/ML; UG/ML
INJECTION, SOLUTION EPIDURAL; INFILTRATION; INTRACAUDAL; PERINEURAL AS NEEDED
Status: DISCONTINUED | OUTPATIENT
Start: 2024-08-15 | End: 2024-08-15 | Stop reason: HOSPADM

## 2024-08-15 RX ORDER — MORPHINE SULFATE 10 MG/ML
2 INJECTION, SOLUTION INTRAMUSCULAR; INTRAVENOUS
Status: DISCONTINUED | OUTPATIENT
Start: 2024-08-15 | End: 2024-08-15 | Stop reason: HOSPADM

## 2024-08-15 RX ORDER — FENTANYL CITRATE/PF 50 MCG/ML
25 SYRINGE (ML) INJECTION
Status: COMPLETED | OUTPATIENT
Start: 2024-08-15 | End: 2024-08-15

## 2024-08-15 RX ORDER — LIDOCAINE HYDROCHLORIDE 20 MG/ML
INJECTION, SOLUTION EPIDURAL; INFILTRATION; INTRACAUDAL; PERINEURAL AS NEEDED
Status: DISCONTINUED | OUTPATIENT
Start: 2024-08-15 | End: 2024-08-15

## 2024-08-15 RX ORDER — ONDANSETRON 2 MG/ML
4 INJECTION INTRAMUSCULAR; INTRAVENOUS ONCE AS NEEDED
Status: DISCONTINUED | OUTPATIENT
Start: 2024-08-15 | End: 2024-08-15 | Stop reason: HOSPADM

## 2024-08-15 RX ORDER — SODIUM CHLORIDE 9 MG/ML
125 INJECTION, SOLUTION INTRAVENOUS CONTINUOUS
Status: DISCONTINUED | OUTPATIENT
Start: 2024-08-15 | End: 2024-08-15 | Stop reason: HOSPADM

## 2024-08-15 RX ORDER — OXYCODONE AND ACETAMINOPHEN 5; 325 MG/1; MG/1
2 TABLET ORAL EVERY 6 HOURS PRN
Status: DISCONTINUED | OUTPATIENT
Start: 2024-08-15 | End: 2024-08-15 | Stop reason: HOSPADM

## 2024-08-15 RX ORDER — CEFAZOLIN SODIUM 1 G/50ML
1000 SOLUTION INTRAVENOUS ONCE
Status: DISCONTINUED | OUTPATIENT
Start: 2024-08-15 | End: 2024-08-15 | Stop reason: HOSPADM

## 2024-08-15 RX ORDER — OXYCODONE HYDROCHLORIDE 5 MG/1
5 TABLET ORAL EVERY 4 HOURS PRN
Qty: 16 TABLET | Refills: 0 | Status: SHIPPED | OUTPATIENT
Start: 2024-08-15 | End: 2024-08-25

## 2024-08-15 RX ORDER — CEFAZOLIN SODIUM 1 G/50ML
SOLUTION INTRAVENOUS AS NEEDED
Status: DISCONTINUED | OUTPATIENT
Start: 2024-08-15 | End: 2024-08-15

## 2024-08-15 RX ORDER — ROCURONIUM BROMIDE 10 MG/ML
INJECTION, SOLUTION INTRAVENOUS AS NEEDED
Status: DISCONTINUED | OUTPATIENT
Start: 2024-08-15 | End: 2024-08-15

## 2024-08-15 RX ORDER — FENTANYL CITRATE 50 UG/ML
INJECTION, SOLUTION INTRAMUSCULAR; INTRAVENOUS AS NEEDED
Status: DISCONTINUED | OUTPATIENT
Start: 2024-08-15 | End: 2024-08-15

## 2024-08-15 RX ORDER — ONDANSETRON 2 MG/ML
INJECTION INTRAMUSCULAR; INTRAVENOUS AS NEEDED
Status: DISCONTINUED | OUTPATIENT
Start: 2024-08-15 | End: 2024-08-15

## 2024-08-15 RX ORDER — DEXAMETHASONE SODIUM PHOSPHATE 10 MG/ML
INJECTION, SOLUTION INTRAMUSCULAR; INTRAVENOUS AS NEEDED
Status: DISCONTINUED | OUTPATIENT
Start: 2024-08-15 | End: 2024-08-15

## 2024-08-15 RX ORDER — MAGNESIUM HYDROXIDE 1200 MG/15ML
LIQUID ORAL AS NEEDED
Status: DISCONTINUED | OUTPATIENT
Start: 2024-08-15 | End: 2024-08-15 | Stop reason: HOSPADM

## 2024-08-15 RX ORDER — SODIUM CHLORIDE, SODIUM LACTATE, POTASSIUM CHLORIDE, CALCIUM CHLORIDE 600; 310; 30; 20 MG/100ML; MG/100ML; MG/100ML; MG/100ML
INJECTION, SOLUTION INTRAVENOUS CONTINUOUS PRN
Status: DISCONTINUED | OUTPATIENT
Start: 2024-08-15 | End: 2024-08-15

## 2024-08-15 RX ORDER — MIDAZOLAM HYDROCHLORIDE 2 MG/2ML
INJECTION, SOLUTION INTRAMUSCULAR; INTRAVENOUS AS NEEDED
Status: DISCONTINUED | OUTPATIENT
Start: 2024-08-15 | End: 2024-08-15

## 2024-08-15 RX ADMIN — SODIUM CHLORIDE 8 MCG: 9 INJECTION, SOLUTION INTRAVENOUS at 10:18

## 2024-08-15 RX ADMIN — SUGAMMADEX 200 MG: 100 INJECTION, SOLUTION INTRAVENOUS at 11:38

## 2024-08-15 RX ADMIN — SODIUM CHLORIDE, SODIUM LACTATE, POTASSIUM CHLORIDE, AND CALCIUM CHLORIDE: .6; .31; .03; .02 INJECTION, SOLUTION INTRAVENOUS at 10:14

## 2024-08-15 RX ADMIN — SODIUM CHLORIDE 4 MCG: 9 INJECTION, SOLUTION INTRAVENOUS at 11:35

## 2024-08-15 RX ADMIN — FENTANYL CITRATE 25 MCG: 50 INJECTION INTRAMUSCULAR; INTRAVENOUS at 12:24

## 2024-08-15 RX ADMIN — ONDANSETRON 4 MG: 2 INJECTION INTRAMUSCULAR; INTRAVENOUS at 11:35

## 2024-08-15 RX ADMIN — HYDROMORPHONE HYDROCHLORIDE 0.5 MG: 1 INJECTION, SOLUTION INTRAMUSCULAR; INTRAVENOUS; SUBCUTANEOUS at 12:35

## 2024-08-15 RX ADMIN — ROCURONIUM BROMIDE 50 MG: 10 INJECTION, SOLUTION INTRAVENOUS at 09:45

## 2024-08-15 RX ADMIN — FENTANYL CITRATE 25 MCG: 50 INJECTION INTRAMUSCULAR; INTRAVENOUS at 12:09

## 2024-08-15 RX ADMIN — ROCURONIUM BROMIDE 20 MG: 10 INJECTION, SOLUTION INTRAVENOUS at 10:19

## 2024-08-15 RX ADMIN — HYDROMORPHONE HYDROCHLORIDE 0.5 MG: 1 INJECTION, SOLUTION INTRAMUSCULAR; INTRAVENOUS; SUBCUTANEOUS at 10:17

## 2024-08-15 RX ADMIN — SODIUM CHLORIDE 125 ML/HR: 0.9 INJECTION, SOLUTION INTRAVENOUS at 08:29

## 2024-08-15 RX ADMIN — OXYCODONE HYDROCHLORIDE AND ACETAMINOPHEN 1 TABLET: 5; 325 TABLET ORAL at 13:37

## 2024-08-15 RX ADMIN — MIDAZOLAM 2 MG: 1 INJECTION INTRAMUSCULAR; INTRAVENOUS at 09:37

## 2024-08-15 RX ADMIN — ROCURONIUM BROMIDE 10 MG: 10 INJECTION, SOLUTION INTRAVENOUS at 11:32

## 2024-08-15 RX ADMIN — FENTANYL CITRATE 25 MCG: 50 INJECTION INTRAMUSCULAR; INTRAVENOUS at 12:17

## 2024-08-15 RX ADMIN — LIDOCAINE HYDROCHLORIDE 80 MG: 20 INJECTION, SOLUTION EPIDURAL; INFILTRATION; INTRACAUDAL at 09:44

## 2024-08-15 RX ADMIN — PROPOFOL 200 MG: 10 INJECTION, EMULSION INTRAVENOUS at 09:44

## 2024-08-15 RX ADMIN — CEFAZOLIN SODIUM 1000 MG: 1 SOLUTION INTRAVENOUS at 09:55

## 2024-08-15 RX ADMIN — FENTANYL CITRATE 25 MCG: 50 INJECTION INTRAMUSCULAR; INTRAVENOUS at 12:29

## 2024-08-15 RX ADMIN — DEXAMETHASONE SODIUM PHOSPHATE 10 MG: 10 INJECTION INTRAMUSCULAR; INTRAVENOUS at 09:46

## 2024-08-15 RX ADMIN — EPHEDRINE SULFATE 10 MG: 50 INJECTION INTRAVENOUS at 10:07

## 2024-08-15 RX ADMIN — FENTANYL CITRATE 100 MCG: 50 INJECTION INTRAMUSCULAR; INTRAVENOUS at 09:44

## 2024-08-15 NOTE — H&P
History & Physical    Danilo Dominguez    58 y.o.  male  [id number]  Surgeon:Bruce Lewis MD  Date: August 15, 2024    Assessment:  Patient Active Problem List   Diagnosis    Allergic rhinitis    Erectile dysfunction of non-organic origin    Hyperlipidemia    Hypothyroidism    Complete tear of left rotator cuff    Chronic cough    Non-recurrent unilateral inguinal hernia without obstruction or gangrene     Plan:  Danilo Dominguez is scheduled for robotic right inguinal hernia    HPI    Historical Information   Past Medical History:   Diagnosis Date    COVID-19 virus infection 01/15/2022    Disease of thyroid gland     Hypothyroidism    Elevated cholesterol with elevated triglycerides     Shoulder pain     left shoulder pain    Tinnitus      Past Surgical History:   Procedure Laterality Date    COLONOSCOPY      KNEE ARTHROSCOPY Left     [place] SURGICAL ARTHROSCOPY SHOULDER W/ROTATOR CUFF RPR Left 12/12/2019    Procedure: REPAIR ROTATOR CUFF  ARTHROSCOPIC;  Surgeon: Bruce Anne MD;  [facility]: [facility] OR;  Service: Orthopedics    WISDOM TOOTH EXTRACTION       Social History   Social History     Substance and Sexual Activity   Alcohol Use Yes    Comment: 1-2 a month use     Social History     Substance and Sexual Activity   Drug Use No    Comment: Denies any drug use per pt     Social History     Tobacco Use   Smoking Status Never   Smokeless Tobacco Never   Tobacco Comments    Never a smoker or use of any tobacco products per pt      Family History   Problem Relation Age of Onset    Coronary artery disease Mother     Hyperlipidemia Brother     Coronary artery disease Family     Anesthesia problems Neg Hx         Meds/Allergies   Allergies   Allergen Reactions    Simvastatin Myalgia       Current Facility-Administered Medications:     ceFAZolin (ANCEF) IVPB (premix in dextrose) 1,000 mg 50 mL, 1,000 mg, Intravenous, Once, Bruce Lewis MD    lactated ringers infusion, 125 mL/hr, Intravenous, Continuous, Bruce  MD Joshua    sodium chloride 0.9 % infusion, 125 mL/hr, Intravenous, Continuous, Al Hernandez DO, Last Rate: 125 mL/hr at 08/15/24 0829, 125 mL/hr at 08/15/24 0829    Review of Systems    Vitals:    08/15/24 0824   BP: 160/91   Pulse: 75   Resp: 16   Temp: 98.3 °F (36.8 °C)   SpO2: 100%     Physical Exam  GEN: NAD, A+OX3   HEENT: Normocephalic, atraumatic,   NECK: Supple, trachea midline,   CARDIAC: regular rate & rhythm, S1 & S2 normal.   LUNGS: Clear to auscultation, No Wheeze, Rales, or Rhonchi  ABDOMEN: Right inguinal hernia  EXTREMITIES: No evidence of cyanosis, clubbing or edema. Pulses +2 B/L LE  NEURO: CN II-XII intact grossly, No sensory or motor deficits    Lab Results: I have personally reviewed pertinent lab results.    Imaging:   EKG, Pathology, and Other Studies:   Lab Results   Component Value Date    CALCIUM 9.0 12/08/2023    K 4.6 12/08/2023    CO2 27 12/08/2023     12/08/2023    BUN 13 12/08/2023    CREATININE 1.15 12/08/2023     Lab Results   Component Value Date    WBC 4.21 (L) 12/08/2023    HGB 15.0 12/08/2023    HCT 45.9 12/08/2023    MCV 90 12/08/2023     12/08/2023     Lab Results   Component Value Date    ALT 25 12/08/2023    AST 26 12/08/2023    ALKPHOS 42 12/08/2023

## 2024-08-15 NOTE — OP NOTE
OPERATIVE REPORT  PATIENT NAME: Danilo Dominguez    :  1965  MRN: [id number]  Pt Location: [facility] OR ROOM 08    SURGERY DATE: 8/15/2024    Surgeons and Role:     * Bruce Lewis MD - Primary     * Maureen Fang PA-C - Assisting     * Mia Inman MD - Assisting    Preop Diagnosis:  Non-recurrent unilateral inguinal hernia without obstruction or gangrene [K40.90]    Post-Op Diagnosis Codes:     * Non-recurrent bilateral inguinal hernia without obstruction or gangrene [K40.20]    Procedure(s):  Bilateral - REPAIR HERNIA B/L INGUINAL  W/ ROBOTICS    Specimen(s):  ID Type Source Tests Collected by Time Destination   1 : Left Cord Lipoma Tissue Lipoma of cord TISSUE EXAM Bruce Lewis MD 8/15/2024 1132    2 : Right Cord Lipoma Tissue Lipoma of cord TISSUE EXAM Bruce Lewis MD 8/15/2024 1133        Estimated Blood Loss:   Minimal    Drains:  Urethral Catheter Latex 16 Fr. (Active)   Number of days: 0       Anesthesia Type:   General    Operative Indications:  Non-recurrent unilateral inguinal hernia without obstruction or gangrene [K40.90]      Operative Findings:  Right direct inguinal hernia  Left indirect inguinal hernia and small direct      Complications:   None    Procedure and Technique:  The patient was seen again in the Holding Room. The risks, benefits, complications, treatment options, and expected outcomes were discussed with the patient. The possibilities of reaction to medication, pulmonary aspiration, perforation of viscus, bleeding, postoperative short or long term nerve entrapment causing pain,recurrent infection, the need for additional procedures, and development of a complication requiring transfusion or further operation were discussed with the patient and/or family. There was concurrence with the proposed plan, and informed consent was obtained. The site of surgery was properly noted/marked. The patient was taken to the Operating Room, identified as Danilo Dominguez and the  procedure verified as hernia repair. A Time Out was held and the above information confirmed.    The patient was prepped and draped in a sterile fashion.  A timeout was again performed.  Local anesthesia was used in the incision. An periumbilical incision was made.  Dissection carried out to the fascia which was grasped with Kocher's and elevated. The fascia was incised an 8 mm trocar was placed in under direct visualization. The abdomen was inflated and the camera was placed in.. Two8 mm trocars were placed lateral to rectus muscle approximately at the level of the umbilicus.  At this point the patient was placed into Trendelenburg position and the robot was docked and the instruments were placed in.  The patient was noted to have bilateral inguinal hernia .   The peritoneum was incised from the medial umbilical fold out laterally past the internal ring on the left.  Next the direct space was mobilized by exposing Axel's ligament all the way along its length to the pubic tubercle.  There was a direct hernia defect was seen this was dissected and reduced.  There  was indirect hernia sac this was carefully mobilized off the cord structures with care to avoid injury to the gonadal vessels or spermatic cord. The remainder of the inferior flap was created. At this point  Bard 3D max mesh was selected and placed into the preperitoneal space. The mesh was deployed and placed in the appropriate position.  The edge of the peritoneum was well below the edge of the mesh.  The mesh secured with a 2-0 Vicryl suture at Axel's.  The peritoneum was then sutured closed with the V lock suture.      The peritoneum was incised from the medial umbilical fold out laterally past the internal ring on the right. Next the direct space was mobilized by exposing Axel's ligament all the way along its length to the pubic tubercle.  There was a direct hernia defect was seen this was dissected and reduced.  The rest of the sac this was  carefully mobilized off the cord structures with care to avoid injury to the gonadal vessels or spermatic cord. The remainder of the inferior flap was created. At this point  Bard 3D max mesh was selected and placed into the preperitoneal space. The mesh was deployed and placed in the appropriate position.  The edge of the peritoneum was well below the edge of the mesh.  The mesh was secured with 2-0 Vicryl suture at Axel's.  The peritoneum was then sutured closed with the V lock suture.     Now the repair was complete the robot was undocked. The umbilical trocor site was closed with an  0-vicryl using a suture Passer  The wound was closed in multiple layers using 3-0 Vicryl sutures and the skin closed using a 4-0 Monocryl subcuticular stitch. The wound was dressed.  The patient was anatomically correct at the end of the procedure.  The patient tolerated the procedure in good condition.    Instrument, sponge, and needle counts were correct prior to closure and at the conclusion of the case.    The PA was essential for assistance with abdominal access and docking the robot as well as retraction and suturing.      This text is generated with voice recognition software. There may be translation, syntax,  or grammatical errors. If you have any questions, please contact the dictating provider.      I was present for the entire procedure.    Patient Disposition:  PACU         SIGNATURE: Bruce Lewis MD  DATE: August 15, 2024  TIME: 11:34 AM

## 2024-08-15 NOTE — ANESTHESIA PREPROCEDURE EVALUATION
Procedure:  REPAIR HERNIA INGUINAL  W/ ROBOTICS (Right: Groin)    Relevant Problems   CARDIO   (+) Hyperlipidemia      ENDO   (+) Hypothyroidism      Other   (+) Erectile dysfunction of non-organic origin   (+) Non-recurrent unilateral inguinal hernia without obstruction or gangrene        Physical Exam    Airway    Mallampati score: II         Dental   No notable dental hx     Cardiovascular  Rhythm: regular, Rate: normal    Pulmonary   Breath sounds clear to auscultation    Other Findings        Anesthesia Plan  ASA Score- 2     Anesthesia Type- general with ASA Monitors.         Additional Monitors:     Airway Plan: ETT.           Plan Factors-Exercise tolerance (METS): >4 METS.    Chart reviewed.   Existing labs reviewed.     Patient is not a current smoker.      Obstructive sleep apnea risk education given perioperatively.        Induction- intravenous.    Postoperative Plan- Plan for postoperative opioid use.     Perioperative Resuscitation Plan - Level 1 - Full Code.       Informed Consent- Anesthetic plan and risks discussed with patient.

## 2024-08-15 NOTE — ANESTHESIA POSTPROCEDURE EVALUATION
%Post-Op Assessment Note    CV Status:  Stable  Pain Score: 0    Pain management: adequate       Mental Status:  Alert and awake   Hydration Status:  Euvolemic   PONV Controlled:  Controlled   Airway Patency:  Patent and adequate     Post Op Vitals Reviewed: Yes    No anethesia notable event occurred.    Staff: Anesthesiologist, CRNA               /72 (08/15/24 1150)    Temp 97.9 °F (36.6 °C) (08/15/24 1150)    Pulse 81 (08/15/24 1150)   Resp 16 (08/15/24 1150)    SpO2   98%

## 2024-08-15 NOTE — DISCHARGE INSTR - AVS FIRST PAGE
[facility]’s General Surgery [facility]     Post-Operative Care Instructions     Dr. Bruce Lewis MD, [facility]     [phone]          1. General: You will feel pulling sensations around the wound or funny aches and pains around the incisions. This is normal. Even minor surgery is a change in your body and this is your body’s way of reacting to it. If you have had abdominal surgery, it may help to support the incision with a small pillow or blanket for comfort when moving or coughing.     2. Wound care:  Okay to shower.  The glue will fall off over the next week or 2.   Use ice for at least the 1st 48 hours.  Do not use for longer than 20 minutes at a time. Use 3 times per day.     3. Water: You may shower over the wounds. Do not bathe or use a pool or hot tub until cleared by the physician.   If you were discharged with a drain, make sure drain site is covered with plastic wrap before showering.      4. Activity: You may go up and down stairs, walk as much as you are comfortable, but walk at least 3 times each day. If you have had abdominal surgery, do not lift anything heavier than 20 pounds for at least 4 weeks.      5. Diet: You may resume a regular diet. If you had a same-day surgery or overnight stay surgery, you may wish to eat lightly for a few days: soups, crackers, and sandwiches. You may resume a regular diet when ready.      6. Medications: Resume all of your previous medications, unless told otherwise by the doctor. Avoid aspirin products for 2-3 days after the date of surgery. You may, at that time, began to take them again. Use Tylenol and Ibuprofen for pain control.  You may alternate these medications every 3 hours.  For example: you may take Tylenol at noon, Ibuprofen at 3:00 p.m., and Tylenol again at 6:00 p.m., etc. You should use ice to assist with pain control as above.  You do not need to take narcotic pain medication unless you are having significant pain.   If you were prescribed a  narcotic pain medication containing Tylenol, such as Percocet or Norco, do not use supplemental Tylenol.      7. Driving: You will need someone to drive you home on the day of surgery or discharge. Do not drive or make any important decisions while on narcotic pain medication or 24 hours and after anesthesia or sedation for surgery. Generally, you may drive when your off all narcotic pain medications and you are comfortable.      8. Upset Stomach: You may take Maalox, Tums, or similar items for an upset stomach. If your narcotic pain medication causes an upset stomach, do not take it on an empty stomach. Try taking it with at least some crackers or toast.      9. Constipation: Patients often experience constipation after surgery. You may take over-the-counter medication for this, such as Metamucil, Senokot, Dulcolax, milk of magnesia, etc. You may take a suppository unless you have had anorectal surgery such as a procedure on your hemorrhoids. If you experience significant nausea or vomiting after abdominal surgery, call the office before trying any of these medications.     10. Call the office: If you are experiencing any of the following: fevers above 101.5°, significant nausea or vomiting, if the wound develops drainage and/or there is excessive redness around the wound, or if you have significant diarrhea or other worsening symptoms.     11. Pain: You may be given a prescription for pain medication.  This will be sent to your pharmacy prior to discharge.     12. Sexual Activity: You may resume sexual activity when you feel ready and comfortable and your incision is sealed and healed without apparent infection risk.     13. Urination: If you have not urinated in 6 hours, go directly to the ER for evaluation for urinary retention.      14. Follow-up in 2 weeks.          **READ ONLY IF YOU HAVE BEEN DISCHARGED WITH A URINARY CATHETER**    Lewis Insertion for Post-Op Urinary Retention        - A prescription for  Flomax will be sent to your pharmacy.  This should be taken daily while the urinary catheter remains in place.    You will not be given a prescription for Flomax if your prostate has been removed.  If you are already taking Flomax, continue the medication as prescribed.     - We will send a message to the urology group who will contact you within the next 48 hours with further instructions and to schedule an appointment for voiding trial and catheter removal.  The urinary catheter will remain in place for approximately 1 week.  If you are not contacted within the next 48 hours please call our office to assist with scheduling your follow-up.     - If you have your own urologist, you should contact your physician the day after discharge for instructions and to schedule a voiding trial and catheter removal.

## 2024-08-19 DIAGNOSIS — E78.2 MIXED HYPERLIPIDEMIA: ICD-10-CM

## 2024-08-19 PROCEDURE — 88302 TISSUE EXAM BY PATHOLOGIST: CPT | Performed by: PATHOLOGY

## 2024-08-19 RX ORDER — FENOFIBRATE 160 MG/1
160 TABLET ORAL DAILY
Qty: 90 TABLET | Refills: 1 | Status: SHIPPED | OUTPATIENT
Start: 2024-08-19

## 2024-08-28 ENCOUNTER — OFFICE VISIT (OUTPATIENT)
Dept: SURGERY | Facility: CLINIC | Age: 59
End: 2024-08-28
Payer: COMMERCIAL

## 2024-08-28 VITALS
HEIGHT: 70 IN | TEMPERATURE: 96.7 F | WEIGHT: 173 LBS | OXYGEN SATURATION: 99 % | RESPIRATION RATE: 16 BRPM | DIASTOLIC BLOOD PRESSURE: 90 MMHG | SYSTOLIC BLOOD PRESSURE: 148 MMHG | BODY MASS INDEX: 24.77 KG/M2 | HEART RATE: 62 BPM

## 2024-08-28 DIAGNOSIS — Z98.890 S/P BILATERAL INGUINAL HERNIA REPAIR: Primary | ICD-10-CM

## 2024-08-28 DIAGNOSIS — Z87.19 S/P BILATERAL INGUINAL HERNIA REPAIR: Primary | ICD-10-CM

## 2024-08-28 PROCEDURE — 99211 OFF/OP EST MAY X REQ PHY/QHP: CPT | Performed by: PHYSICIAN ASSISTANT

## 2024-08-28 NOTE — PROGRESS NOTES
Assessment/Plan:   Danilo Dominguez is a 58 y.o.male who comes in today for postoperative check after robotic bilateral inguinal hernia repair with Dr. Lewis on 8/15/24.    Patient is pleased with surgical results.     Pathology: Reviewed with patient, all questions answered.   Final Diagnosis   A.  Soft tissue, left cord lipoma, excision:  -Benign lipoma.     B.  Soft tissue, right cord lipoma, excision:  -Benign lipoma.     Postoperative restrictions reviewed, including specific lifting and exercise restrictions. All questions answered.       ___________________________________________________  HPI:  Danilo Dominguez is a 58 y.o.male who comes in today for postoperative check after recent robotic bilateral inguinal hernia repair with Dr. Lewis on 8/15/24.    Currently doing well without problems, no fever or chills,no nausea and no vomiting. Denies chest pain and troubles breathing. Reports no issues.    ROS:  General ROS: negative for - chills, fatigue, fever or night sweats, weight loss  Respiratory ROS: no cough, shortness of breath, or wheezing  Cardiovascular ROS: no chest pain or dyspnea on exertion  Genito-Urinary ROS: no dysuria, trouble voiding, or hematuria  Musculoskeletal ROS: negative for - gait disturbance, joint pain or muscle pain  Neurological ROS: no TIA or stroke symptoms    GI ROS: see HPI  Skin ROS: no new rashes or lesions   Lymphatic ROS: no new adenopathy noted by pt.   GYN ROS: see HPI, no new GYN history or bleeding noted  Psy ROS: no new mental or behavioral disturbances     Patient Active Problem List   Diagnosis    Allergic rhinitis    Erectile dysfunction of non-organic origin    Hyperlipidemia    Hypothyroidism    Complete tear of left rotator cuff    Chronic cough    Non-recurrent unilateral inguinal hernia without obstruction or gangrene    Non-recurrent bilateral inguinal hernia without obstruction or gangrene         Allergies:   Simvastatin      Current Outpatient  Medications:     albuterol (Proventil HFA) 90 mcg/act inhaler, Inhale 2 puffs every 6 (six) hours as needed for wheezing, Disp: 6.7 g, Rfl: 0    COLLAGEN PO, Take by mouth in the morning, Disp: , Rfl:     fenofibrate 160 MG tablet, TAKE 1 TABLET DAILY (NEED OFFICE VISIT), Disp: 90 tablet, Rfl: 1    levothyroxine 25 mcg tablet, TAKE 1 TABLET DAILY, Disp: 90 tablet, Rfl: 1    tadalafil (CIALIS) 5 MG tablet, Take 1 tablet (5 mg total) by mouth daily (Patient taking differently: Take 5 mg by mouth daily As needed for ED), Disp: 30 tablet, Rfl: 5    Past Medical History:   Diagnosis Date    COVID-19 virus infection 01/15/2022    Disease of thyroid gland     Hypothyroidism    Elevated cholesterol with elevated triglycerides     Shoulder pain     left shoulder pain    Tinnitus        Past Surgical History:   Procedure Laterality Date    COLONOSCOPY      KNEE ARTHROSCOPY Left     OK LAPAROSCOPY SURG RPR INITIAL INGUINAL HERNIA Bilateral 8/15/2024    Procedure: REPAIR HERNIA B/L INGUINAL  W/ ROBOTICS;  Surgeon: Bruce Lewis MD;  Location: [facility];  Service: General    OK SURGICAL ARTHROSCOPY SHOULDER W/ROTATOR CUFF RPR Left 12/12/2019    Procedure: REPAIR ROTATOR CUFF  ARTHROSCOPIC;  Surgeon: Bruce Anne MD;  Location: [facility];  Service: Orthopedics    WISDOM TOOTH EXTRACTION         Family History   Problem Relation Age of Onset    Coronary artery disease Mother     Hyperlipidemia Brother     Coronary artery disease Family     Anesthesia problems Neg Hx         reports that he has never smoked. He has never used smokeless tobacco. He reports that he does not currently use alcohol. He reports that he does not use drugs.    Vitals:    08/28/24 0910   BP: 148/90   Pulse: 62   Resp: 16   Temp: (!) 96.7 °F (35.9 °C)   SpO2: 99%        PHYSICAL EXAM  General: normal, cooperative, no distress  Abdominal: soft, nondistended, or nontender  Incision: clean, dry, and intact and healing well    Elham Padgett  SILVANA    Date: 8/28/2024 Time: 9:15 AM

## 2024-10-05 DIAGNOSIS — R05.3 CHRONIC COUGH: ICD-10-CM

## 2024-10-07 RX ORDER — ALBUTEROL SULFATE 90 UG/1
2 INHALANT RESPIRATORY (INHALATION) EVERY 6 HOURS PRN
Qty: 6.7 G | Refills: 0 | Status: SHIPPED | OUTPATIENT
Start: 2024-10-07

## 2024-10-28 DIAGNOSIS — E03.9 HYPOTHYROIDISM, UNSPECIFIED TYPE: ICD-10-CM

## 2024-10-28 RX ORDER — LEVOTHYROXINE SODIUM 25 UG/1
TABLET ORAL
Qty: 90 TABLET | Refills: 3 | Status: SHIPPED | OUTPATIENT
Start: 2024-10-28

## 2024-11-04 DIAGNOSIS — R05.3 CHRONIC COUGH: ICD-10-CM

## 2024-11-05 RX ORDER — ALBUTEROL SULFATE 90 UG/1
INHALANT RESPIRATORY (INHALATION)
Qty: 6.7 G | Refills: 1 | Status: SHIPPED | OUTPATIENT
Start: 2024-11-05

## 2024-12-27 DIAGNOSIS — F52.21 ERECTILE DYSFUNCTION OF NON-ORGANIC ORIGIN: ICD-10-CM

## 2024-12-27 RX ORDER — TADALAFIL 5 MG/1
5 TABLET ORAL DAILY
Qty: 8 TABLET | Refills: 3 | Status: SHIPPED | OUTPATIENT
Start: 2024-12-27

## 2024-12-30 ENCOUNTER — TELEPHONE (OUTPATIENT)
Age: 59
End: 2024-12-30

## 2024-12-30 NOTE — TELEPHONE ENCOUNTER
[employer] APPROVED     Date(s) approved November 30, 2024 to December 30, 202         Patient advised by          []PAX [employer] Message  []Phone call   [x]LMOM  []L/M to call office as no active Communication consent on file  []Unable to leave detailed message as VM not approved on Communication consent       Pharmacy advised by    [x]Fax  []Phone call    Approval letter scanned into [employer]

## 2024-12-30 NOTE — TELEPHONE ENCOUNTER
PA for tadalafil (CIALIS) 5 MG tablet SUBMITTED to [facility]    via    [x][employer]-Case ID # [id number]      [x]PA sent as URGENT    All office notes, labs and other pertaining documents and studies sent. Clinical questions answered. Awaiting determination from insurance company.     Turnaround time for your insurance to make a decision on your Prior Authorization can take 7-21 business days.

## 2025-01-14 DIAGNOSIS — R05.3 CHRONIC COUGH: ICD-10-CM

## 2025-01-14 RX ORDER — ALBUTEROL SULFATE 90 UG/1
INHALANT RESPIRATORY (INHALATION)
Qty: 6.7 G | Refills: 1 | Status: SHIPPED | OUTPATIENT
Start: 2025-01-14

## 2025-02-11 ENCOUNTER — RA CDI HCC (OUTPATIENT)
Dept: OTHER | Facility: HOSPITAL | Age: 60
End: 2025-02-11

## 2025-02-11 ENCOUNTER — APPOINTMENT (OUTPATIENT)
Dept: LAB | Facility: CLINIC | Age: 60
End: 2025-02-11
Payer: COMMERCIAL

## 2025-02-11 DIAGNOSIS — Z12.5 SCREENING FOR PROSTATE CANCER: ICD-10-CM

## 2025-02-11 DIAGNOSIS — E03.9 HYPOTHYROIDISM, UNSPECIFIED TYPE: ICD-10-CM

## 2025-02-11 DIAGNOSIS — E78.5 HYPERLIPIDEMIA, UNSPECIFIED HYPERLIPIDEMIA TYPE: ICD-10-CM

## 2025-02-11 DIAGNOSIS — F52.21 ERECTILE DYSFUNCTION OF NON-ORGANIC ORIGIN: ICD-10-CM

## 2025-02-11 LAB
ALBUMIN SERPL BCG-MCNC: 4.5 G/DL (ref 3.5–5)
ALP SERPL-CCNC: 39 U/L (ref 34–104)
ALT SERPL W P-5'-P-CCNC: 18 U/L (ref 7–52)
ANION GAP SERPL CALCULATED.3IONS-SCNC: 7 MMOL/L (ref 4–13)
AST SERPL W P-5'-P-CCNC: 24 U/L (ref 13–39)
BASOPHILS # BLD AUTO: 0.06 THOUSANDS/ΜL (ref 0–0.1)
BASOPHILS NFR BLD AUTO: 1 % (ref 0–1)
BILIRUB SERPL-MCNC: 0.83 MG/DL (ref 0.2–1)
BUN SERPL-MCNC: 18 MG/DL (ref 5–25)
CALCIUM SERPL-MCNC: 9.3 MG/DL (ref 8.4–10.2)
CHLORIDE SERPL-SCNC: 105 MMOL/L (ref 96–108)
CHOLEST SERPL-MCNC: 209 MG/DL (ref ?–200)
CO2 SERPL-SCNC: 29 MMOL/L (ref 21–32)
CREAT SERPL-MCNC: 1.24 MG/DL (ref 0.6–1.3)
EOSINOPHIL # BLD AUTO: 0.16 THOUSAND/ΜL (ref 0–0.61)
EOSINOPHIL NFR BLD AUTO: 4 % (ref 0–6)
ERYTHROCYTE [DISTWIDTH] IN BLOOD BY AUTOMATED COUNT: 12.9 % (ref 11.6–15.1)
GFR SERPL CREATININE-BSD FRML MDRD: 63 ML/MIN/1.73SQ M
GLUCOSE P FAST SERPL-MCNC: 97 MG/DL (ref 65–99)
HCT VFR BLD AUTO: 43.9 % (ref 36.5–49.3)
HDLC SERPL-MCNC: 63 MG/DL
HGB BLD-MCNC: 14.6 G/DL (ref 12–17)
IMM GRANULOCYTES # BLD AUTO: 0.01 THOUSAND/UL (ref 0–0.2)
IMM GRANULOCYTES NFR BLD AUTO: 0 % (ref 0–2)
LDLC SERPL CALC-MCNC: 121 MG/DL (ref 0–100)
LYMPHOCYTES # BLD AUTO: 1.66 THOUSANDS/ΜL (ref 0.6–4.47)
LYMPHOCYTES NFR BLD AUTO: 37 % (ref 14–44)
MCH RBC QN AUTO: 30.4 PG (ref 26.8–34.3)
MCHC RBC AUTO-ENTMCNC: 33.3 G/DL (ref 31.4–37.4)
MCV RBC AUTO: 92 FL (ref 82–98)
MONOCYTES # BLD AUTO: 0.46 THOUSAND/ΜL (ref 0.17–1.22)
MONOCYTES NFR BLD AUTO: 10 % (ref 4–12)
NEUTROPHILS # BLD AUTO: 2.13 THOUSANDS/ΜL (ref 1.85–7.62)
NEUTS SEG NFR BLD AUTO: 48 % (ref 43–75)
NRBC BLD AUTO-RTO: 0 /100 WBCS
PLATELET # BLD AUTO: 281 THOUSANDS/UL (ref 149–390)
PMV BLD AUTO: 10.1 FL (ref 8.9–12.7)
POTASSIUM SERPL-SCNC: 3.9 MMOL/L (ref 3.5–5.3)
PROT SERPL-MCNC: 6.6 G/DL (ref 6.4–8.4)
PSA SERPL-MCNC: 0.8 NG/ML (ref 0–4)
RBC # BLD AUTO: 4.8 MILLION/UL (ref 3.88–5.62)
SODIUM SERPL-SCNC: 141 MMOL/L (ref 135–147)
TRIGL SERPL-MCNC: 125 MG/DL (ref ?–150)
TSH SERPL DL<=0.05 MIU/L-ACNC: 2.03 UIU/ML (ref 0.45–4.5)
WBC # BLD AUTO: 4.48 THOUSAND/UL (ref 4.31–10.16)

## 2025-02-11 PROCEDURE — 80053 COMPREHEN METABOLIC PANEL: CPT

## 2025-02-11 PROCEDURE — 80061 LIPID PANEL: CPT

## 2025-02-11 PROCEDURE — 36415 COLL VENOUS BLD VENIPUNCTURE: CPT

## 2025-02-11 PROCEDURE — G0103 PSA SCREENING: HCPCS

## 2025-02-11 PROCEDURE — 85025 COMPLETE CBC W/AUTO DIFF WBC: CPT

## 2025-02-11 PROCEDURE — 84443 ASSAY THYROID STIM HORMONE: CPT

## 2025-02-12 ENCOUNTER — RESULTS FOLLOW-UP (OUTPATIENT)
Dept: FAMILY MEDICINE CLINIC | Facility: CLINIC | Age: 60
End: 2025-02-12

## 2025-02-13 ENCOUNTER — OFFICE VISIT (OUTPATIENT)
Dept: FAMILY MEDICINE CLINIC | Facility: CLINIC | Age: 60
End: 2025-02-13
Payer: COMMERCIAL

## 2025-02-13 VITALS
DIASTOLIC BLOOD PRESSURE: 82 MMHG | BODY MASS INDEX: 25.91 KG/M2 | HEIGHT: 70 IN | HEART RATE: 82 BPM | SYSTOLIC BLOOD PRESSURE: 164 MMHG | WEIGHT: 181 LBS | OXYGEN SATURATION: 100 %

## 2025-02-13 DIAGNOSIS — F52.21 ERECTILE DYSFUNCTION OF NON-ORGANIC ORIGIN: ICD-10-CM

## 2025-02-13 DIAGNOSIS — E78.5 HYPERLIPIDEMIA, UNSPECIFIED HYPERLIPIDEMIA TYPE: ICD-10-CM

## 2025-02-13 DIAGNOSIS — Z00.00 ANNUAL PHYSICAL EXAM: ICD-10-CM

## 2025-02-13 DIAGNOSIS — E03.9 HYPOTHYROIDISM, UNSPECIFIED TYPE: Primary | ICD-10-CM

## 2025-02-13 PROCEDURE — 99214 OFFICE O/P EST MOD 30 MIN: CPT | Performed by: PHYSICIAN ASSISTANT

## 2025-02-13 PROCEDURE — 99396 PREV VISIT EST AGE 40-64: CPT | Performed by: PHYSICIAN ASSISTANT

## 2025-02-13 RX ORDER — TADALAFIL 20 MG/1
20 TABLET ORAL DAILY
Qty: 30 TABLET | Refills: 1 | Status: SHIPPED | OUTPATIENT
Start: 2025-02-13

## 2025-02-13 NOTE — ASSESSMENT & PLAN NOTE
Stable with Cialis as necessary.  Orders:  •  tadalafil (CIALIS) 20 MG tablet; Take 1 tablet (20 mg total) by mouth daily As needed for ED

## 2025-02-13 NOTE — ASSESSMENT & PLAN NOTE
Cholesterol numbers have been rising with LDL of 121.  Patient continues fenofibrate 160 mg daily.  Encourage healthy diet and exercise with this.  Will reassess in 6 months.  Orders:  •  CBC and differential; Future  •  Comprehensive metabolic panel; Future  •  TSH, 3rd generation; Future  •  Lipid Panel with Direct LDL reflex; Future

## 2025-02-13 NOTE — ASSESSMENT & PLAN NOTE
Stable with levothyroxine 25 mcg daily, no medication changes.  Orders:  •  CBC and differential; Future  •  Comprehensive metabolic panel; Future  •  TSH, 3rd generation; Future  •  Lipid Panel with Direct LDL reflex; Future

## 2025-02-13 NOTE — PROGRESS NOTES
Adult Annual Physical  Name: Danilo Dominguez      : 1965      MRN: [id number]  Encounter Provider: Sabas Davis PA-C  Encounter Date: 2025   Encounter department: [facility]    Assessment & Plan  Hypothyroidism, unspecified type  Stable with levothyroxine 25 mcg daily, no medication changes.  Orders:  •  CBC and differential; Future  •  Comprehensive metabolic panel; Future  •  TSH, 3rd generation; Future  •  Lipid Panel with Direct LDL reflex; Future    Hyperlipidemia, unspecified hyperlipidemia type  Cholesterol numbers have been rising with LDL of 121.  Patient continues fenofibrate 160 mg daily.  Encourage healthy diet and exercise with this.  Will reassess in 6 months.  Orders:  •  CBC and differential; Future  •  Comprehensive metabolic panel; Future  •  TSH, 3rd generation; Future  •  Lipid Panel with Direct LDL reflex; Future    Erectile dysfunction of non-organic origin  Stable with Cialis as necessary.  Orders:  •  tadalafil (CIALIS) 20 MG tablet; Take 1 tablet (20 mg total) by mouth daily As needed for ED    Annual physical exam  Vaccines were reviewed with patient.  We discussed the shingles vaccine which she will consider at his next well visit.  We also discussed healthy diet and exercise in detail.  He is planning to return to a routine.  He is up-to-date with colon cancer screening until December of this year.  He typically follows with Dr. Corona Abreu for this.  He does continue to follow with dental care regularly.       Immunizations and preventive care screenings were discussed with patient today. Appropriate education was printed on patient's after visit summary.        Counseling:  Dental Health: discussed importance of regular tooth brushing, flossing, and dental visits.  Exercise: the importance of regular exercise/physical activity was discussed. Recommend exercise 3-5 times per week for at least 30 minutes.       Depression Screening and Follow-up  Plan: Patient was screened for depression during today's encounter. They screened negative with a PHQ-2 score of 0.          History of Present Illness     Adult Annual Physical:  Patient presents for annual physical. HPI: This is a 59-year-old gentleman that presents to the office for annual physical and follow-up of chronic health conditions.  He has had some recent blood work completed.  He does have a history of hyperlipidemia and elevated triglycerides.  He is currently on fenofibrate 160 mg daily.  There has been a significant increase in his LDL cholesterol and he attributes this to his recent trip to [place].  He has had a business trip there for the past 5 months and has been working 12 to 14-hour days as a  with high stress and has not been able to exercise or go to the gym regularly.  He does typically go to the gym when he is at home on a regular basis.  He has gained about 9 pounds since our last visit as well.  Patient has returned home from his trip now and feels that his numbers will improve over the next 6 months..     Diet and Physical Activity:  - Diet/Nutrition: well balanced diet.  - Exercise: moderate cardiovascular exercise.    Depression Screening:  - PHQ-2 Score: 0    General Health:  - Sleep: sleeps well.  - Hearing: normal hearing bilateral ears.  - Vision: no vision problems.  - Dental: regular dental visits.     Health:    - Urinary symptoms: none.     Advanced Care Planning:  - Has an advanced directive?: no    - Has a durable medical POA?: no    - ACP document given to patient?: no      Review of Systems   Constitutional:  Negative for chills, fatigue and fever.   HENT:  Negative for congestion, ear pain and sinus pressure.    Eyes:  Negative for visual disturbance.   Respiratory:  Negative for cough, chest tightness and shortness of breath.    Cardiovascular:  Negative for chest pain and palpitations.   Gastrointestinal:  Negative for diarrhea, nausea and vomiting.  "  Endocrine: Negative for polyuria.   Genitourinary:  Negative for dysuria and frequency.   Musculoskeletal:  Negative for arthralgias and myalgias.   Skin:  Negative for pallor and rash.   Neurological:  Negative for dizziness, weakness, light-headedness, numbness and headaches.   Psychiatric/Behavioral:  Negative for agitation, behavioral problems and sleep disturbance.    All other systems reviewed and are negative.        Objective   /82 (BP Location: Left arm, Patient Position: Sitting, Cuff Size: Large)   Pulse 82   Ht 5' 10\" (1.778 m)   Wt 82.1 kg (181 lb)   SpO2 100%   BMI 25.97 kg/m²     Physical Exam  Constitutional:       General: He is not in acute distress.     Appearance: He is well-developed.   HENT:      Head: Normocephalic and atraumatic.      Right Ear: Tympanic membrane normal.      Left Ear: Tympanic membrane normal.   Eyes:      Conjunctiva/sclera: Conjunctivae normal.   Cardiovascular:      Rate and Rhythm: Normal rate and regular rhythm.   Pulmonary:      Effort: Pulmonary effort is normal.   Abdominal:      General: Abdomen is flat. Bowel sounds are normal. There is no distension.      Palpations: Abdomen is soft. There is no mass.   Musculoskeletal:         General: Normal range of motion.      Cervical back: Normal range of motion.   Skin:     General: Skin is warm.      Findings: No rash.   Neurological:      Mental Status: He is alert and oriented to person, place, and time.   Psychiatric:         Mood and Affect: Mood normal.         "

## 2025-02-17 DIAGNOSIS — E78.2 MIXED HYPERLIPIDEMIA: ICD-10-CM

## 2025-02-17 RX ORDER — FENOFIBRATE 160 MG/1
160 TABLET ORAL DAILY
Qty: 90 TABLET | Refills: 1 | Status: SHIPPED | OUTPATIENT
Start: 2025-02-17

## 2025-03-07 ENCOUNTER — OFFICE VISIT (OUTPATIENT)
Dept: FAMILY MEDICINE CLINIC | Facility: CLINIC | Age: 60
End: 2025-03-07
Payer: COMMERCIAL

## 2025-03-07 ENCOUNTER — APPOINTMENT (OUTPATIENT)
Dept: RADIOLOGY | Facility: MEDICAL CENTER | Age: 60
End: 2025-03-07
Payer: COMMERCIAL

## 2025-03-07 VITALS
DIASTOLIC BLOOD PRESSURE: 78 MMHG | HEIGHT: 70 IN | SYSTOLIC BLOOD PRESSURE: 130 MMHG | HEART RATE: 77 BPM | WEIGHT: 180 LBS | BODY MASS INDEX: 25.77 KG/M2 | OXYGEN SATURATION: 98 %

## 2025-03-07 DIAGNOSIS — M79.671 RIGHT FOOT PAIN: ICD-10-CM

## 2025-03-07 DIAGNOSIS — R20.0 NUMBNESS OF TOES: ICD-10-CM

## 2025-03-07 DIAGNOSIS — E78.5 HYPERLIPIDEMIA, UNSPECIFIED HYPERLIPIDEMIA TYPE: ICD-10-CM

## 2025-03-07 DIAGNOSIS — M79.671 RIGHT FOOT PAIN: Primary | ICD-10-CM

## 2025-03-07 DIAGNOSIS — E03.9 HYPOTHYROIDISM, UNSPECIFIED TYPE: ICD-10-CM

## 2025-03-07 PROCEDURE — 99213 OFFICE O/P EST LOW 20 MIN: CPT | Performed by: PHYSICIAN ASSISTANT

## 2025-03-07 PROCEDURE — 73630 X-RAY EXAM OF FOOT: CPT

## 2025-03-07 NOTE — PROGRESS NOTES
Name: Danilo Dominguez      : 1965      MRN: [id number]  Encounter Provider: Sabas Davis PA-C  Encounter Date: 3/7/2025   Encounter department: [facility]  :  Assessment & Plan  Right foot pain  Patient seems to have some discomfort when pushing off with the right foot.  He also has some numbness around the fourth digit and believes he has an old fracture in this area that did not heal up correctly.  Would recommend repeating x-ray.  We did review some old x-rays from  which seemed to show some abnormalities.  He we will be referred to podiatry for further evaluation.  There is no sign of compromise blood flow or neurologic supply.  Orders:  •  XR foot 3+ vw right; Future  •  Ambulatory Referral to Podiatry; Future    Hypothyroidism, unspecified type  Stable with levothyroxine 25 mcg daily, no medication changes.       Hyperlipidemia, unspecified hyperlipidemia type  Stable with fenofibrate, no medication changes.       Numbness of toes  Will assess x-ray and see podiatry as above.  Orders:  •  Ambulatory Referral to Podiatry; Future           History of Present Illness   HPI: This is a 59-year-old gentleman that presents to the office with concerns over numbness that has been in the fourth digit on the right foot for approximately 6 to 12 months.  He states that he does believe that he had an old fracture in this area that he never did anything about.  He feels that it is bothering him more and more when he is being physically active and especially things like running when he is pushing off with the foot it is starting to bother him more.  He had been seeing podiatrist in the past for hallux rigidus and continues with some deformity there as well.  He has not had any weakness in the ankle and has not had any discoloration in the toe.  He denies any ecchymosis or ulceration.      Review of Systems   Constitutional:  Negative for fever.   HENT:  Negative for congestion.   "  Respiratory:  Negative for cough, chest tightness and shortness of breath.    Cardiovascular:  Negative for chest pain.   Musculoskeletal:  Negative for arthralgias.   Neurological:  Positive for numbness.       Objective   /78 (BP Location: Left arm, Patient Position: Sitting, Cuff Size: Adult)   Pulse 77   Ht 5' 10\" (1.778 m)   Wt 81.6 kg (180 lb)   SpO2 98%   BMI 25.83 kg/m²      Physical Exam  Constitutional:       Appearance: He is well-developed.   HENT:      Head: Normocephalic.   Cardiovascular:      Rate and Rhythm: Normal rate and regular rhythm.   Pulmonary:      Effort: Pulmonary effort is normal. No respiratory distress.      Breath sounds: Normal breath sounds.   Abdominal:      Palpations: Abdomen is soft.   Musculoskeletal:         General: Normal range of motion.      Comments: Full range of motion with the right foot and toes.  He is able to feel light touch at all digits.  Capillary refill is less than 2 seconds throughout all digits.  There is no discoloration of the toe.   Neurological:      Mental Status: He is alert and oriented to person, place, and time.         "

## 2025-03-10 ENCOUNTER — RESULTS FOLLOW-UP (OUTPATIENT)
Dept: FAMILY MEDICINE CLINIC | Facility: CLINIC | Age: 60
End: 2025-03-10

## 2025-03-14 ENCOUNTER — OFFICE VISIT (OUTPATIENT)
Dept: FAMILY MEDICINE CLINIC | Facility: CLINIC | Age: 60
End: 2025-03-14
Payer: COMMERCIAL

## 2025-03-14 VITALS
TEMPERATURE: 94.7 F | DIASTOLIC BLOOD PRESSURE: 70 MMHG | BODY MASS INDEX: 25.62 KG/M2 | HEIGHT: 70 IN | HEART RATE: 78 BPM | OXYGEN SATURATION: 99 % | WEIGHT: 179 LBS | SYSTOLIC BLOOD PRESSURE: 130 MMHG

## 2025-03-14 DIAGNOSIS — J01.00 ACUTE NON-RECURRENT MAXILLARY SINUSITIS: Primary | ICD-10-CM

## 2025-03-14 DIAGNOSIS — E03.9 HYPOTHYROIDISM, UNSPECIFIED TYPE: ICD-10-CM

## 2025-03-14 DIAGNOSIS — H10.33 ACUTE BACTERIAL CONJUNCTIVITIS OF BOTH EYES: ICD-10-CM

## 2025-03-14 PROCEDURE — 99214 OFFICE O/P EST MOD 30 MIN: CPT | Performed by: PHYSICIAN ASSISTANT

## 2025-03-14 RX ORDER — OFLOXACIN 3 MG/ML
1 SOLUTION/ DROPS OPHTHALMIC 4 TIMES DAILY
Qty: 5 ML | Refills: 0 | Status: SHIPPED | OUTPATIENT
Start: 2025-03-14

## 2025-03-14 RX ORDER — DEXTROMETHORPHAN HYDROBROMIDE AND PROMETHAZINE HYDROCHLORIDE 15; 6.25 MG/5ML; MG/5ML
10 SYRUP ORAL
Qty: 120 ML | Refills: 0 | Status: SHIPPED | OUTPATIENT
Start: 2025-03-14

## 2025-03-14 NOTE — PROGRESS NOTES
Name: Danilo Dominguez      : 1965      MRN: [id number]  Encounter Provider: Sabas Davis PA-C  Encounter Date: 3/14/2025   Encounter department: [facility]  :  Assessment & Plan  Acute non-recurrent maxillary sinusitis    Orders:  •  amoxicillin-clavulanate (AUGMENTIN) 875-125 mg per tablet; Take 1 tablet by mouth every 12 (twelve) hours for 10 days  •  promethazine-dextromethorphan (PHENERGAN-DM) 6.25-15 mg/5 mL oral syrup; Take 10 mL by mouth daily at bedtime as needed for cough    Acute bacterial conjunctivitis of both eyes    Orders:  •  ofloxacin (OCUFLOX) 0.3 % ophthalmic solution; Administer 1 drop to both eyes 4 (four) times a day    Hypothyroidism, unspecified type  Stable with levothyroxine 25 mcg daily.              History of Present Illness   HPI: This is a 59-year-old gentleman that presents to the office with concerns over sinus congestion, postnasal drip, and worsening cough for the past week.  He was seen in the office and felt that he likely had viral symptoms initially.  His symptoms seem to be a little bit better for a few days but then got worse.  He is coughing with some productive yellow mucus.  He is also noticing some irritation of the eyes bilaterally.  He is having some purulent discharge in the morning when he wakes up.  He is using warm compresses and is not having any pain but is complaining of some itching.  He does feel like the discharge from the eyes causes the eyes to be a little bit more blurry.  He also feels that he is having pressure in the maxillary area bilaterally and when he is waking up he is having some bloody discharge from the nose.  Cough is also waking him at nighttime.  He has not had any fevers present.      Review of Systems   Constitutional:  Positive for activity change and fatigue. Negative for fever.   HENT:  Positive for congestion, postnasal drip, rhinorrhea, sinus pressure and sore throat. Negative for ear pain.   "  Respiratory:  Positive for cough. Negative for chest tightness, shortness of breath and wheezing.    Cardiovascular:  Negative for palpitations.   Gastrointestinal:  Negative for diarrhea and nausea.   Musculoskeletal:  Positive for myalgias. Negative for arthralgias.   Skin:  Negative for rash.   Neurological:  Negative for dizziness and numbness.   All other systems reviewed and are negative.      Objective   /70 (BP Location: Left arm, Patient Position: Sitting, Cuff Size: Adult)   Pulse 78   Temp (!) 94.7 °F (34.8 °C) (Tympanic)   Ht 5' 10\" (1.778 m)   Wt 81.2 kg (179 lb)   SpO2 99%   BMI 25.68 kg/m²      Physical Exam  Vitals and nursing note reviewed.   Constitutional:       General: He is not in acute distress.     Appearance: He is well-developed.   HENT:      Head: Normocephalic and atraumatic.      Nose:      Comments: Turbinates are erythematous and edematous bilaterally.  There is some active bleeding from the right nare.     Mouth/Throat:      Pharynx: No oropharyngeal exudate.   Eyes:      General:         Right eye: No discharge.         Left eye: No discharge.      Pupils: Pupils are equal, round, and reactive to light.      Comments: Bilateral scleral erythema and scant yellow discharge at the lower lid.   Cardiovascular:      Rate and Rhythm: Normal rate and regular rhythm.      Heart sounds: Normal heart sounds. No murmur heard.     No friction rub.   Pulmonary:      Effort: Pulmonary effort is normal. No respiratory distress.      Breath sounds: Normal breath sounds. No wheezing or rales.   Musculoskeletal:         General: Normal range of motion.      Cervical back: Neck supple.   Lymphadenopathy:      Cervical: Cervical adenopathy present.   Skin:     General: Skin is warm and dry.      Findings: No erythema.   Neurological:      Mental Status: He is alert and oriented to person, place, and time.   Psychiatric:         Behavior: Behavior normal.         "

## 2025-04-04 DIAGNOSIS — J01.00 ACUTE NON-RECURRENT MAXILLARY SINUSITIS: ICD-10-CM

## 2025-04-04 RX ORDER — DEXTROMETHORPHAN HYDROBROMIDE AND PROMETHAZINE HYDROCHLORIDE 15; 6.25 MG/5ML; MG/5ML
10 SYRUP ORAL
Qty: 120 ML | Refills: 0 | Status: SHIPPED | OUTPATIENT
Start: 2025-04-04

## 2025-05-02 ENCOUNTER — OFFICE VISIT (OUTPATIENT)
Dept: FAMILY MEDICINE CLINIC | Facility: CLINIC | Age: 60
End: 2025-05-02
Payer: COMMERCIAL

## 2025-05-02 ENCOUNTER — TELEPHONE (OUTPATIENT)
Age: 60
End: 2025-05-02

## 2025-05-02 ENCOUNTER — APPOINTMENT (OUTPATIENT)
Dept: RADIOLOGY | Facility: MEDICAL CENTER | Age: 60
End: 2025-05-02
Payer: COMMERCIAL

## 2025-05-02 ENCOUNTER — RESULTS FOLLOW-UP (OUTPATIENT)
Dept: FAMILY MEDICINE CLINIC | Facility: CLINIC | Age: 60
End: 2025-05-02

## 2025-05-02 VITALS
BODY MASS INDEX: 24.48 KG/M2 | WEIGHT: 171 LBS | SYSTOLIC BLOOD PRESSURE: 136 MMHG | HEART RATE: 64 BPM | HEIGHT: 70 IN | DIASTOLIC BLOOD PRESSURE: 88 MMHG | OXYGEN SATURATION: 97 %

## 2025-05-02 DIAGNOSIS — R00.2 PALPITATIONS: ICD-10-CM

## 2025-05-02 DIAGNOSIS — Z56.6 STRESS AT WORK: ICD-10-CM

## 2025-05-02 DIAGNOSIS — R07.9 CHEST PAIN, UNSPECIFIED TYPE: ICD-10-CM

## 2025-05-02 DIAGNOSIS — R07.9 CHEST PAIN, UNSPECIFIED TYPE: Primary | ICD-10-CM

## 2025-05-02 DIAGNOSIS — Z82.49 FAMILY HISTORY OF CORONARY ARTERY DISEASE: ICD-10-CM

## 2025-05-02 PROCEDURE — 93000 ELECTROCARDIOGRAM COMPLETE: CPT | Performed by: PHYSICIAN ASSISTANT

## 2025-05-02 PROCEDURE — 99214 OFFICE O/P EST MOD 30 MIN: CPT | Performed by: PHYSICIAN ASSISTANT

## 2025-05-02 PROCEDURE — 71046 X-RAY EXAM CHEST 2 VIEWS: CPT

## 2025-05-02 SDOH — HEALTH STABILITY - MENTAL HEALTH: OTHER PHYSICAL AND MENTAL STRAIN RELATED TO WORK: Z56.6

## 2025-05-02 NOTE — PROGRESS NOTES
Name: Danilo Dominguez      : 1965      MRN: [id number]  Encounter Provider: Sabas Davis PA-C  Encounter Date: 2025   Encounter department: [facility]  :  Assessment & Plan  Chest pain, unspecified type  Patient does have some unusual symptoms with not feeling quite right, weight loss, lightheadedness, some palpitations.  His EKG is without change from .  He appears stable at this time.  Would recommend getting a stat chest x-ray, stress Myoview nuclear test, Holter monitor, and cardiology evaluation.  If at any point patient feels his symptoms are worsening I would recommend he go to the emergency room for full evaluation.  Patient verbalizes understanding and agreement with plan.  Orders:  •  POCT ECG  •  XR chest pa and lateral; Future  •  NM myocardial perfusion spect (stress and/or rest); Future  •  Ambulatory Referral to Cardiology; Future  •  Holter monitor; Future    Palpitations  Currently patient is in normal sinus rhythm, EKG is stable without change.  Recommend Holter monitor.  Orders:  •  Ambulatory Referral to Cardiology; Future  •  Holter monitor; Future    Stress at work  Patient is struggling with some increased stressors at work which could be confounding his symptoms.       Family history of coronary artery disease  Father passed suddenly at the age of 57.  Recommend complete cardiac eval.              History of Present Illness   HPI: This is a 59-year-old gentleman that presents to the office with concerns that he has not been feeling quite right for the past several weeks.  He has been having episodes of chest discomfort and some tingling and numbness in the right arm that seems to come primarily in the evening.  He also has episodes when he is exercising sometimes feeling lightheaded.  He states that he has been losing some weight unintentionally recently as well.  He does have a family history of premature cardiac disease with his father passing  "suddenly at age 57 from a cardiac illness.      Review of Systems   Constitutional:  Negative for chills, fatigue and fever.   HENT:  Negative for congestion, ear pain and sinus pressure.    Eyes:  Negative for visual disturbance.   Respiratory:  Positive for chest tightness. Negative for cough and shortness of breath.    Cardiovascular:  Positive for chest pain and palpitations.   Gastrointestinal:  Negative for diarrhea, nausea and vomiting.   Endocrine: Negative for polyuria.   Genitourinary:  Negative for dysuria and frequency.   Musculoskeletal:  Negative for arthralgias and myalgias.   Skin:  Negative for pallor and rash.   Neurological:  Positive for numbness. Negative for dizziness, weakness, light-headedness and headaches.   Psychiatric/Behavioral:  Negative for agitation, behavioral problems and sleep disturbance.    All other systems reviewed and are negative.      Objective   /88 (BP Location: Right arm, Patient Position: Sitting, Cuff Size: Standard)   Pulse 64   Ht 5' 10\" (1.778 m)   Wt 77.6 kg (171 lb)   SpO2 97%   BMI 24.54 kg/m²      Physical Exam  Constitutional:       General: He is not in acute distress.     Appearance: He is well-developed.   HENT:      Head: Normocephalic and atraumatic.      Right Ear: Tympanic membrane normal.      Left Ear: Tympanic membrane normal.   Eyes:      Conjunctiva/sclera: Conjunctivae normal.   Cardiovascular:      Rate and Rhythm: Normal rate and regular rhythm.   Pulmonary:      Effort: Pulmonary effort is normal.   Abdominal:      General: Abdomen is flat. Bowel sounds are normal. There is no distension.      Palpations: Abdomen is soft. There is no mass.   Musculoskeletal:         General: Normal range of motion.      Cervical back: Normal range of motion.   Skin:     General: Skin is warm.      Findings: No rash.   Neurological:      Mental Status: He is alert and oriented to person, place, and time.   Psychiatric:         Mood and Affect: Mood " normal.

## 2025-05-02 NOTE — TELEPHONE ENCOUNTER
"Hello,    The following message was sent via e-mail to the leadership team:     Please advise if you can help facilitate the following overbook request:    Patient Name: Danilo Ryan    Patient MRN: [id number]    Call back #: [phone]    Insurance: [employer]    Department:Cardiology    Speciality: General Cardiology    Reason for overbook request: STAT REFERRAL    Comments (Write \"N/a\" if no comments): N/A    Requested doctor and location: any doctor/[place] or [place]    Date of current appointment: 7/29      Thank you.      "

## 2025-05-13 ENCOUNTER — HOSPITAL ENCOUNTER (OUTPATIENT)
Dept: NON INVASIVE DIAGNOSTICS | Facility: CLINIC | Age: 60
Discharge: HOME | End: 2025-05-13
Attending: PHYSICIAN ASSISTANT
Payer: COMMERCIAL

## 2025-05-13 VITALS
HEIGHT: 70 IN | WEIGHT: 171 LBS | BODY MASS INDEX: 24.48 KG/M2 | DIASTOLIC BLOOD PRESSURE: 86 MMHG | SYSTOLIC BLOOD PRESSURE: 158 MMHG

## 2025-05-13 DIAGNOSIS — R00.2 PALPITATIONS: ICD-10-CM

## 2025-05-13 DIAGNOSIS — R07.9 CHEST PAIN, UNSPECIFIED TYPE: ICD-10-CM

## 2025-05-13 LAB
CHEST PAIN STATEMENT: NORMAL
MAX DIASTOLIC BP: 68 MMHG
MAX HR PERCENT: 107 %
MAX HR: 173 BPM
MAX PREDICTED HEART RATE: 161 BPM
PROTOCOL NAME: NORMAL
RATE PRESSURE PRODUCT: NORMAL
REASON FOR TERMINATION: NORMAL
SL CV REST NUCLEAR ISOTOPE DOSE: 10.65 MCI
SL CV STRESS NUCLEAR ISOTOPE DOSE: 32 MCI
SL CV STRESS RECOVERY BP: NORMAL MMHG
SL CV STRESS RECOVERY HR: 102 BPM
SL CV STRESS RECOVERY O2 SAT: 97 %
SL CV STRESS STAGE REACHED: 5
SPECT HRT GATED+EF W RNC IV: 71 %
STRESS ANGINA INDEX: 0
STRESS BASELINE BP: NORMAL MMHG
STRESS BASELINE HR: 63 BPM
STRESS O2 SAT REST: 98 %
STRESS PEAK HR: 173 BPM
STRESS POST ESTIMATED WORKLOAD: 17.2 METS
STRESS POST EXERCISE DUR MIN: 13 MIN
STRESS POST EXERCISE DUR MIN: 13 MIN
STRESS POST EXERCISE DUR SEC: 0 SEC
STRESS POST EXERCISE DUR SEC: 0 SEC
STRESS POST O2 SAT PEAK: 97 %
STRESS POST PEAK BP: 210 MMHG
STRESS POST PEAK HR: 173 BPM
STRESS POST PEAK SYSTOLIC BP: 238 MMHG
STRESS/REST PERFUSION RATIO: 0.99
TARGET HR FORMULA: NORMAL
TEST INDICATION: NORMAL

## 2025-05-13 PROCEDURE — 93017 CV STRESS TEST TRACING ONLY: CPT

## 2025-05-13 PROCEDURE — 78452 HT MUSCLE IMAGE SPECT MULT: CPT | Performed by: INTERNAL MEDICINE

## 2025-05-13 PROCEDURE — 93226 XTRNL ECG REC<48 HR SCAN A/R: CPT

## 2025-05-13 PROCEDURE — 78452 HT MUSCLE IMAGE SPECT MULT: CPT

## 2025-05-13 PROCEDURE — 93225 XTRNL ECG REC<48 HRS REC: CPT

## 2025-05-13 PROCEDURE — 93018 CV STRESS TEST I&R ONLY: CPT | Performed by: INTERNAL MEDICINE

## 2025-05-13 PROCEDURE — A9502 TC99M TETROFOSMIN: HCPCS

## 2025-05-13 PROCEDURE — 93016 CV STRESS TEST SUPVJ ONLY: CPT | Performed by: INTERNAL MEDICINE

## 2025-05-25 DIAGNOSIS — F52.21 ERECTILE DYSFUNCTION OF NON-ORGANIC ORIGIN: ICD-10-CM

## 2025-05-26 RX ORDER — TADALAFIL 20 MG/1
20 TABLET ORAL DAILY
Qty: 30 TABLET | Refills: 0 | Status: SHIPPED | OUTPATIENT
Start: 2025-05-26

## 2025-07-24 DIAGNOSIS — F52.21 ERECTILE DYSFUNCTION OF NON-ORGANIC ORIGIN: ICD-10-CM

## 2025-07-25 RX ORDER — TADALAFIL 20 MG/1
TABLET ORAL
Qty: 30 TABLET | Refills: 1 | Status: SHIPPED | OUTPATIENT
Start: 2025-07-25

## 2025-07-29 ENCOUNTER — CONSULT (OUTPATIENT)
Dept: CARDIOLOGY CLINIC | Facility: CLINIC | Age: 60
End: 2025-07-29
Attending: PHYSICIAN ASSISTANT
Payer: COMMERCIAL

## 2025-07-29 VITALS
BODY MASS INDEX: 24.42 KG/M2 | DIASTOLIC BLOOD PRESSURE: 86 MMHG | HEART RATE: 68 BPM | SYSTOLIC BLOOD PRESSURE: 150 MMHG | HEIGHT: 70 IN | WEIGHT: 170.6 LBS

## 2025-07-29 DIAGNOSIS — R00.2 PALPITATIONS: ICD-10-CM

## 2025-07-29 DIAGNOSIS — R07.9 CHEST PAIN, UNSPECIFIED TYPE: ICD-10-CM

## 2025-07-29 DIAGNOSIS — E78.5 HYPERLIPIDEMIA, UNSPECIFIED HYPERLIPIDEMIA TYPE: Primary | ICD-10-CM

## 2025-07-29 PROCEDURE — 99204 OFFICE O/P NEW MOD 45 MIN: CPT

## (undated) DEVICE — STRL ALLENTOWN HIP SHOULDER PK: Brand: CARDINAL HEALTH

## (undated) DEVICE — PMI DISPOSABLE PUNCTURE CLOSURE DEVICE / SUTURE GRASPER: Brand: PMI

## (undated) DEVICE — CHLORAPREP HI-LITE 26ML ORANGE

## (undated) DEVICE — BLADE SHAVER DISSECTOR 4MM 13CM COOLCUT

## (undated) DEVICE — BURR  OVAL 4MM 13CM 8 FLUTE COOLCUT

## (undated) DEVICE — NEEDLE HYPO 23G X 1-1/2 IN

## (undated) DEVICE — ABDOMINAL PAD: Brand: DERMACEA

## (undated) DEVICE — TUBING EXTENSION 6 FT CONTIN WAVE

## (undated) DEVICE — ALLENTOWN LAP CHOLE APP PACK: Brand: CARDINAL HEALTH

## (undated) DEVICE — DISPOSABLE OR TOWEL: Brand: CARDINAL HEALTH

## (undated) DEVICE — COLUMN DRAPE

## (undated) DEVICE — SCD SEQUENTIAL COMPRESSION COMFORT SLEEVE MEDIUM KNEE LENGTH: Brand: KENDALL SCD

## (undated) DEVICE — PUDDLE VAC

## (undated) DEVICE — SUT MONOCRYL 4-0 PS-2 27 IN Y426H

## (undated) DEVICE — NEEDLE SPINAL18G X 3.5 IN QUINCKE

## (undated) DEVICE — SUT VLOC 90 3-0 V-20 9IN VLOCM0644

## (undated) DEVICE — MEGA SUTURECUT ND: Brand: ENDOWRIST

## (undated) DEVICE — GAUZE SPONGES,USP TYPE VII GAUZE, 12 PLY: Brand: CURITY

## (undated) DEVICE — POSITIONER OSI BEACH CHAIR FOAM

## (undated) DEVICE — TIP COVER ACCESSORY

## (undated) DEVICE — EXPRESSEW III SUTURE NEEDLE FOR USE WITH EXPRESSEW II OR III SUTURE PASSER: Brand: EXPRESSEW

## (undated) DEVICE — DRAPE EQUIPMENT RF WAND

## (undated) DEVICE — INTENDED FOR TISSUE SEPARATION, AND OTHER PROCEDURES THAT REQUIRE A SHARP SURGICAL BLADE TO PUNCTURE OR CUT.: Brand: BARD-PARKER ® CARBON RIB-BACK BLADES

## (undated) DEVICE — KERLIX BANDAGE ROLL: Brand: KERLIX

## (undated) DEVICE — [HIGH FLOW INSUFFLATOR,  DO NOT USE IF PACKAGE IS DAMAGED,  KEEP DRY,  KEEP AWAY FROM SUNLIGHT,  PROTECT FROM HEAT AND RADIOACTIVE SOURCES.]: Brand: PNEUMOSURE

## (undated) DEVICE — THREADED CLEAR CANNULA WITH OBTURATOR 7MM X 75MM

## (undated) DEVICE — EXOFIN PRECISION PEN HIGH VISCOSITY TOPICAL SKIN ADHESIVE: Brand: EXOFIN PRECISION PEN, 1G

## (undated) DEVICE — SUT VICRYL 0 UR-6 27 IN J603H

## (undated) DEVICE — SUT VICRYL 2-0 SH 27 IN UNDYED J417H

## (undated) DEVICE — 3M™ STERI-STRIP™ REINFORCED ADHESIVE SKIN CLOSURES, R1547, 1/2 IN X 4 IN (12 MM X 100 MM), 6 STRIPS/ENVELOPE: Brand: 3M™ STERI-STRIP™

## (undated) DEVICE — GLOVE SRG BIOGEL 8

## (undated) DEVICE — NEEDLE 18 G X 1 1/2

## (undated) DEVICE — PENCIL ELECTROSURG E-Z CLEAN -0035H

## (undated) DEVICE — POSITIONER TRIMANO LIMB BEACH CHAIR

## (undated) DEVICE — SUT MONOCRYL 2-0 SH 27 IN Y417H

## (undated) DEVICE — TUBING SUCTION 5MM X 12 FT

## (undated) DEVICE — GLOVE SRG BIOGEL 7.5

## (undated) DEVICE — ACE WRAP 4 IN UNSTERILE

## (undated) DEVICE — INTENDED FOR TISSUE SEPARATION, AND OTHER PROCEDURES THAT REQUIRE A SHARP SURGICAL BLADE TO PUNCTURE OR CUT.: Brand: BARD-PARKER SAFETY BLADES SIZE 11, STERILE

## (undated) DEVICE — SYRINGE 20ML LL

## (undated) DEVICE — VAPR COOLPULSE 90 ELECTRODE 90 DEGREES SUCTION WITH INTEGRATED HANDPIECE: Brand: VAPR COOLPULSE

## (undated) DEVICE — IMPERVIOUS STOCKINETTE: Brand: DEROYAL

## (undated) DEVICE — DRAPE SHEET X-LG

## (undated) DEVICE — THREADED CLEAR CANNULA WITH OBTURATOR 5.5MM X 75MM

## (undated) DEVICE — PROGRASP FORCEPS: Brand: ENDOWRIST

## (undated) DEVICE — TRAY FOLEY 16FR URIMETER SURESTEP

## (undated) DEVICE — ASTOUND STANDARD SURGICAL GOWN, XL: Brand: CONVERTORS

## (undated) DEVICE — GLOVE INDICATOR PI UNDERGLOVE SZ 8.5 BLUE

## (undated) DEVICE — CHEST ROLL FOAM POSITIONER: Brand: CARDINAL HEALTH

## (undated) DEVICE — GLOVE SRG BIOGEL 6.5

## (undated) DEVICE — TIBURON LAPAROSCOPIC ABDOMINAL DRAPE: Brand: CONVERTORS

## (undated) DEVICE — CYSTO TUBING TUR Y IRRIGATION

## (undated) DEVICE — MONOPOLAR CURVED SCISSORS: Brand: ENDOWRIST

## (undated) DEVICE — 3M™ STERI-DRAPE™ U-DRAPE 1015: Brand: STERI-DRAPE™

## (undated) DEVICE — GLOVE SRG BIOGEL ECLIPSE 7.5

## (undated) DEVICE — GLOVE INDICATOR PI UNDERGLOVE SZ 6.5 BLUE

## (undated) DEVICE — ARM DRAPE

## (undated) DEVICE — BLADELESS OBTURATOR: Brand: WECK VISTA

## (undated) DEVICE — COBAN 6 IN STERILE

## (undated) DEVICE — CANNULA SEAL

## (undated) DEVICE — GLOVE INDICATOR PI UNDERGLOVE SZ 8 BLUE

## (undated) DEVICE — ELECTRO LUBE IS A SINGLE PATIENT USE DEVICE THAT IS INTENDED TO BE USED ON ELECTROSURGICAL ELECTRODES TO REDUCE STICKING.: Brand: KEY SURGICAL ELECTRO LUBE